# Patient Record
Sex: FEMALE | Race: WHITE | NOT HISPANIC OR LATINO | Employment: FULL TIME | URBAN - METROPOLITAN AREA
[De-identification: names, ages, dates, MRNs, and addresses within clinical notes are randomized per-mention and may not be internally consistent; named-entity substitution may affect disease eponyms.]

---

## 2017-02-15 ENCOUNTER — HOSPITAL ENCOUNTER (INPATIENT)
Facility: HOSPITAL | Age: 52
LOS: 9 days | Discharge: HOME | DRG: 234 | End: 2017-02-24
Attending: EMERGENCY MEDICINE | Admitting: INTERNAL MEDICINE
Payer: COMMERCIAL

## 2017-02-15 DIAGNOSIS — E03.4 HYPOTHYROIDISM DUE TO ACQUIRED ATROPHY OF THYROID: ICD-10-CM

## 2017-02-15 DIAGNOSIS — R73.03 PREDIABETES: ICD-10-CM

## 2017-02-15 DIAGNOSIS — Z95.1 S/P CABG X 3: Primary | ICD-10-CM

## 2017-02-15 DIAGNOSIS — I25.10 CORONARY ARTERY DISEASE INVOLVING NATIVE CORONARY ARTERY OF NATIVE HEART WITHOUT ANGINA PECTORIS: ICD-10-CM

## 2017-02-15 DIAGNOSIS — E66.9 OBESITY (BMI 30-39.9): ICD-10-CM

## 2017-02-15 DIAGNOSIS — Z01.810 PRE-OPERATIVE CARDIOVASCULAR EXAMINATION: ICD-10-CM

## 2017-02-15 DIAGNOSIS — I10 HTN (HYPERTENSION), BENIGN: ICD-10-CM

## 2017-02-15 DIAGNOSIS — R73.9 HYPERGLYCEMIA: ICD-10-CM

## 2017-02-15 DIAGNOSIS — I21.4 NSTEMI (NON-ST ELEVATED MYOCARDIAL INFARCTION): ICD-10-CM

## 2017-02-15 DIAGNOSIS — N28.9 ACUTE RENAL INSUFFICIENCY: ICD-10-CM

## 2017-02-15 DIAGNOSIS — Z72.0 TOBACCO ABUSE: ICD-10-CM

## 2017-02-15 DIAGNOSIS — I21.4 NON-ST ELEVATION (NSTEMI) MYOCARDIAL INFARCTION: ICD-10-CM

## 2017-02-15 DIAGNOSIS — F41.9 ANXIETY: ICD-10-CM

## 2017-02-15 DIAGNOSIS — R07.9 CHEST PAIN: ICD-10-CM

## 2017-02-15 PROBLEM — E03.9 HYPOTHYROID: Status: ACTIVE | Noted: 2017-02-15

## 2017-02-15 LAB
ALBUMIN SERPL BCP-MCNC: 4.1 G/DL
ALP SERPL-CCNC: 113 U/L
ALT SERPL W/O P-5'-P-CCNC: 11 U/L
ANION GAP SERPL CALC-SCNC: 10 MMOL/L
AST SERPL-CCNC: 18 U/L
BACTERIA #/AREA URNS HPF: ABNORMAL /HPF
BASOPHILS # BLD AUTO: 0.06 K/UL
BASOPHILS NFR BLD: 0.7 %
BILIRUB SERPL-MCNC: 0.3 MG/DL
BILIRUB UR QL STRIP: NEGATIVE
BUN SERPL-MCNC: 14 MG/DL
CALCIUM SERPL-MCNC: 10 MG/DL
CHLORIDE SERPL-SCNC: 108 MMOL/L
CLARITY UR: CLEAR
CO2 SERPL-SCNC: 24 MMOL/L
COLOR UR: YELLOW
CREAT SERPL-MCNC: 0.9 MG/DL
DIFFERENTIAL METHOD: ABNORMAL
EOSINOPHIL # BLD AUTO: 0.3 K/UL
EOSINOPHIL NFR BLD: 3.3 %
ERYTHROCYTE [DISTWIDTH] IN BLOOD BY AUTOMATED COUNT: 13.6 %
EST. GFR  (AFRICAN AMERICAN): >60 ML/MIN/1.73 M^2
EST. GFR  (NON AFRICAN AMERICAN): >60 ML/MIN/1.73 M^2
GLUCOSE SERPL-MCNC: 103 MG/DL
GLUCOSE UR QL STRIP: NEGATIVE
HCT VFR BLD AUTO: 50.2 %
HGB BLD-MCNC: 15.9 G/DL
HGB UR QL STRIP: NEGATIVE
HYALINE CASTS #/AREA URNS LPF: 0 /LPF
INR PPP: 0.9
KETONES UR QL STRIP: NEGATIVE
LEUKOCYTE ESTERASE UR QL STRIP: ABNORMAL
LYMPHOCYTES # BLD AUTO: 1.6 K/UL
LYMPHOCYTES NFR BLD: 17.6 %
MAGNESIUM SERPL-MCNC: 2.2 MG/DL
MAGNESIUM SERPL-MCNC: 2.5 MG/DL
MCH RBC QN AUTO: 29.2 PG
MCHC RBC AUTO-ENTMCNC: 31.7 %
MCV RBC AUTO: 92 FL
MICROSCOPIC COMMENT: ABNORMAL
MONOCYTES # BLD AUTO: 0.6 K/UL
MONOCYTES NFR BLD: 6.6 %
NEUTROPHILS # BLD AUTO: 6.6 K/UL
NEUTROPHILS NFR BLD: 71.8 %
NITRITE UR QL STRIP: NEGATIVE
PH UR STRIP: 5 [PH] (ref 5–8)
PLATELET # BLD AUTO: 247 K/UL
PMV BLD AUTO: 12.2 FL
POTASSIUM SERPL-SCNC: 4.2 MMOL/L
PROT SERPL-MCNC: 8.3 G/DL
PROT UR QL STRIP: ABNORMAL
PROTHROMBIN TIME: 10 SEC
RBC # BLD AUTO: 5.44 M/UL
RBC #/AREA URNS HPF: 0 /HPF (ref 0–4)
SODIUM SERPL-SCNC: 142 MMOL/L
SP GR UR STRIP: 1.02 (ref 1–1.03)
SQUAMOUS #/AREA URNS HPF: 5 /HPF
TROPONIN I SERPL DL<=0.01 NG/ML-MCNC: 0.04 NG/ML
TROPONIN I SERPL DL<=0.01 NG/ML-MCNC: 0.08 NG/ML
TROPONIN I SERPL DL<=0.01 NG/ML-MCNC: 0.22 NG/ML
URN SPEC COLLECT METH UR: ABNORMAL
UROBILINOGEN UR STRIP-ACNC: NEGATIVE EU/DL
WAXY CASTS #/AREA URNS LPF: 2 /LPF
WBC # BLD AUTO: 9.18 K/UL
WBC #/AREA URNS HPF: 2 /HPF (ref 0–5)

## 2017-02-15 PROCEDURE — 25000003 PHARM REV CODE 250: Performed by: EMERGENCY MEDICINE

## 2017-02-15 PROCEDURE — 80053 COMPREHEN METABOLIC PANEL: CPT

## 2017-02-15 PROCEDURE — 81000 URINALYSIS NONAUTO W/SCOPE: CPT

## 2017-02-15 PROCEDURE — 85610 PROTHROMBIN TIME: CPT

## 2017-02-15 PROCEDURE — 84484 ASSAY OF TROPONIN QUANT: CPT | Mod: 91

## 2017-02-15 PROCEDURE — 93005 ELECTROCARDIOGRAM TRACING: CPT

## 2017-02-15 PROCEDURE — 63600175 PHARM REV CODE 636 W HCPCS: Performed by: EMERGENCY MEDICINE

## 2017-02-15 PROCEDURE — 36415 COLL VENOUS BLD VENIPUNCTURE: CPT

## 2017-02-15 PROCEDURE — 25000003 PHARM REV CODE 250: Performed by: INTERNAL MEDICINE

## 2017-02-15 PROCEDURE — 85025 COMPLETE CBC W/AUTO DIFF WBC: CPT

## 2017-02-15 PROCEDURE — 83735 ASSAY OF MAGNESIUM: CPT | Mod: 91

## 2017-02-15 PROCEDURE — 99285 EMERGENCY DEPT VISIT HI MDM: CPT | Mod: 25

## 2017-02-15 PROCEDURE — 11000001 HC ACUTE MED/SURG PRIVATE ROOM

## 2017-02-15 PROCEDURE — 96372 THER/PROPH/DIAG INJ SC/IM: CPT

## 2017-02-15 RX ORDER — NITROGLYCERIN 0.4 MG/1
0.4 TABLET SUBLINGUAL EVERY 5 MIN PRN
Status: DISCONTINUED | OUTPATIENT
Start: 2017-02-15 | End: 2017-02-15

## 2017-02-15 RX ORDER — MEPERIDINE HYDROCHLORIDE 50 MG/ML
25 INJECTION INTRAMUSCULAR; INTRAVENOUS; SUBCUTANEOUS ONCE
Status: DISCONTINUED | OUTPATIENT
Start: 2017-02-16 | End: 2017-02-16

## 2017-02-15 RX ORDER — NITROGLYCERIN 0.4 MG/1
0.4 TABLET SUBLINGUAL
Status: COMPLETED | OUTPATIENT
Start: 2017-02-15 | End: 2017-02-15

## 2017-02-15 RX ORDER — ASPIRIN 325 MG
325 TABLET ORAL DAILY
Status: DISCONTINUED | OUTPATIENT
Start: 2017-02-16 | End: 2017-02-21

## 2017-02-15 RX ORDER — NEBIVOLOL 10 MG/1
10 TABLET ORAL DAILY
Status: DISCONTINUED | OUTPATIENT
Start: 2017-02-15 | End: 2017-02-21

## 2017-02-15 RX ORDER — ENOXAPARIN SODIUM 100 MG/ML
1 INJECTION SUBCUTANEOUS
Status: COMPLETED | OUTPATIENT
Start: 2017-02-15 | End: 2017-02-15

## 2017-02-15 RX ORDER — LISINOPRIL 20 MG/1
20 TABLET ORAL DAILY
Status: DISCONTINUED | OUTPATIENT
Start: 2017-02-15 | End: 2017-02-18

## 2017-02-15 RX ORDER — CLONIDINE HYDROCHLORIDE 0.1 MG/1
0.3 TABLET ORAL EVERY 6 HOURS PRN
Status: DISCONTINUED | OUTPATIENT
Start: 2017-02-15 | End: 2017-02-18

## 2017-02-15 RX ORDER — LORAZEPAM 0.5 MG/1
0.5 TABLET ORAL EVERY 8 HOURS PRN
Status: DISCONTINUED | OUTPATIENT
Start: 2017-02-15 | End: 2017-02-20

## 2017-02-15 RX ORDER — PROMETHAZINE HYDROCHLORIDE 25 MG/ML
12.5 INJECTION, SOLUTION INTRAMUSCULAR; INTRAVENOUS ONCE
Status: DISCONTINUED | OUTPATIENT
Start: 2017-02-16 | End: 2017-02-18

## 2017-02-15 RX ORDER — ASPIRIN 325 MG
325 TABLET ORAL
Status: COMPLETED | OUTPATIENT
Start: 2017-02-15 | End: 2017-02-15

## 2017-02-15 RX ORDER — MORPHINE SULFATE 10 MG/ML
2 INJECTION INTRAMUSCULAR; INTRAVENOUS; SUBCUTANEOUS EVERY 4 HOURS PRN
Status: DISCONTINUED | OUTPATIENT
Start: 2017-02-15 | End: 2017-02-21

## 2017-02-15 RX ORDER — CLOPIDOGREL 300 MG/1
300 TABLET, FILM COATED ORAL ONCE
Status: COMPLETED | OUTPATIENT
Start: 2017-02-15 | End: 2017-02-15

## 2017-02-15 RX ORDER — NITROGLYCERIN 0.4 MG/1
0.4 TABLET SUBLINGUAL EVERY 5 MIN PRN
Status: DISCONTINUED | OUTPATIENT
Start: 2017-02-15 | End: 2017-02-21

## 2017-02-15 RX ORDER — SODIUM CHLORIDE 9 MG/ML
INJECTION, SOLUTION INTRAVENOUS CONTINUOUS
Status: ACTIVE | OUTPATIENT
Start: 2017-02-15 | End: 2017-02-15

## 2017-02-15 RX ORDER — ATORVASTATIN CALCIUM 20 MG/1
80 TABLET, FILM COATED ORAL DAILY
Status: DISCONTINUED | OUTPATIENT
Start: 2017-02-15 | End: 2017-02-21

## 2017-02-15 RX ORDER — LEVOTHYROXINE SODIUM 25 UG/1
25 TABLET ORAL
Status: DISCONTINUED | OUTPATIENT
Start: 2017-02-15 | End: 2017-02-24 | Stop reason: HOSPADM

## 2017-02-15 RX ORDER — SODIUM CHLORIDE 0.9 % (FLUSH) 0.9 %
3 SYRINGE (ML) INJECTION EVERY 8 HOURS PRN
Status: DISCONTINUED | OUTPATIENT
Start: 2017-02-15 | End: 2017-02-21

## 2017-02-15 RX ORDER — ONDANSETRON 2 MG/ML
8 INJECTION INTRAMUSCULAR; INTRAVENOUS EVERY 8 HOURS PRN
Status: DISCONTINUED | OUTPATIENT
Start: 2017-02-15 | End: 2017-02-21

## 2017-02-15 RX ADMIN — LORAZEPAM 0.5 MG: 0.5 TABLET ORAL at 10:02

## 2017-02-15 RX ADMIN — ASPIRIN 325 MG ORAL TABLET 325 MG: 325 PILL ORAL at 07:02

## 2017-02-15 RX ADMIN — CLONIDINE HYDROCHLORIDE 0.3 MG: 0.1 TABLET ORAL at 03:02

## 2017-02-15 RX ADMIN — SODIUM CHLORIDE: 0.9 INJECTION, SOLUTION INTRAVENOUS at 07:02

## 2017-02-15 RX ADMIN — NEBIVOLOL HYDROCHLORIDE 10 MG: 10 TABLET ORAL at 10:02

## 2017-02-15 RX ADMIN — LISINOPRIL 20 MG: 20 TABLET ORAL at 10:02

## 2017-02-15 RX ADMIN — ENOXAPARIN SODIUM 110 MG: 100 INJECTION SUBCUTANEOUS at 07:02

## 2017-02-15 RX ADMIN — ATORVASTATIN CALCIUM 80 MG: 40 TABLET, FILM COATED ORAL at 10:02

## 2017-02-15 RX ADMIN — NITROGLYCERIN 0.4 MG: 0.4 TABLET SUBLINGUAL at 07:02

## 2017-02-15 RX ADMIN — SODIUM CHLORIDE: 0.9 INJECTION, SOLUTION INTRAVENOUS at 10:02

## 2017-02-15 RX ADMIN — LEVOTHYROXINE SODIUM 25 MCG: 25 TABLET ORAL at 10:02

## 2017-02-15 RX ADMIN — CLOPIDOGREL BISULFATE 300 MG: 300 TABLET, FILM COATED ORAL at 08:02

## 2017-02-15 NOTE — CONSULTS
Dictation #1  MRN:[id number]  CSN:[id number]    [id number] dicated    Unstable angina with + markers and inferior ST changes   + smoker   very strong FH CAD  Classic symptoms of ANABELL  Obesity  HPTN    1. Hi dose statin / check lipids  2. Cath in am (got hi dose lovenox in the ER at 8 am ---- cannot cath today because of this)  3. outpt sleep study  4. No smoking       Cath in am / possible ptca/stent or cabg ----- (see dictation)        Pt's family well known to me and Dr. Brooks who will cath tomorrow carroll navarro

## 2017-02-15 NOTE — ED PROVIDER NOTES
"Encounter Date: 2/15/2017    SCRIBE #1 NOTE: I, Juvenal Taylor, am scribing for, and in the presence of, Con Jackson MD. Other sections scribed: HPI, ROS.       History     Chief Complaint   Patient presents with    Chest Pain     "I've been having on and off chest pain for a month but it's getting worse. I also feel pain in my jaw and both of my arms" Pt says she took tylenol for pain     Review of patient's allergies indicates:   Allergen Reactions    Codeine     Prednisone      HPI Comments: CC: Chest Pain  HPI: This 52 y.o. obese female smoker with HTN, hypothyroidism, anxiety disorder and Hx of cholecystectomy presents to the ED c/o episodes of chest pain for the past 3 months. Pt explains that pain originates in her ears and then radiates through her jaw and neck into her chest. Pt states that initially this was treated with abx as a suspected ear infection, but this treatment had no effect. Pt states that these episodes of pain have started becoming more frequent and severe lately. She states that she was evaluated for this at [facility] ED a few weeks ago, but refused admit for further evaluation following a single (-) Troponin. Pt states that she has started to feel SOB with these episodes some times. Pt reports 2-3 hours of constant pain last night that eventually resolved on its own, but then woke her from her sleep this morning. Pt states her Sx are moderate at this time. She states he has been taking Advil and Tylenol for this pain with no relief. Pt denies fever, cough, N/V, abdominal pain, back pain. Sister reports that she herself had to get 4 stents placed a few months after normal stress test last year, and their father once had to get CABG x5 done after having normal stress test earlier in that particular year.      The history is provided by the patient and a relative.     Past Medical History:   Diagnosis Date    Hypertension     Hypothyroidism     Panic attack     Renal disorder     Kidney " stones     No past medical history pertinent negatives.  Past Surgical History:   Procedure Laterality Date    CHOLECYSTECTOMY      cystoscope      VAGINA SURGERY       History reviewed. No pertinent family history.  Social History   Substance Use Topics    Smoking status: Current Every Day Smoker     Packs/day: 0.50     Types: Cigarettes    Smokeless tobacco: None    Alcohol use No     Review of Systems   Constitutional: Negative for chills and fever.   HENT: Negative for congestion, ear pain and sore throat.    Eyes: Negative for pain and visual disturbance.   Respiratory: Positive for shortness of breath. Negative for cough.    Cardiovascular: Positive for chest pain.   Gastrointestinal: Negative for abdominal pain, diarrhea, nausea and vomiting.   Genitourinary: Negative for difficulty urinating and dysuria.   Musculoskeletal: Negative for arthralgias, back pain and myalgias.   Skin: Negative for wound.   Neurological: Negative for syncope and headaches.       Physical Exam   Initial Vitals   BP Pulse Resp Temp SpO2   02/15/17 0640 02/15/17 0640 02/15/17 0640 02/15/17 0640 02/15/17 0640   225/127 65 20 97.8 °F (36.6 °C) 100 %     Physical Exam  Nursing note and vitals reviewed.  Constitutional:  Nontoxic appearing middle-aged female in no obvious distress.  HENT:    Head: NC/AT    Eyes: Conjunctivae normal.   (-) scleral icterus              Mouth/Throat: MMM.     Neck: Neck supple, normal rom.   Cardiovascular: RRR  Pulmonary/Chest: CTAB  Abdominal: Soft. ND/NT.  No guarding or rebound.  (+)BS.  (-) CVA tenderness.  Musculoskeletal: FROM of all major joints. No extremity edema or tenderness.  Neurological: A&Ox4, Normal speech.  No focal motor deficits.  Normal gait  Skin: Skin is warm and dry.   Psychiatric: normal mood and affect.      ED Course   Critical Care  Date/Time: 2/20/2017 3:12 PM  Performed by: BRITTANIE NICOLE.  Authorized by: BRITTANIE NICOLE   Direct patient critical care time: 20  minutes  Additional history critical care time: 10 minutes  Ordering / reviewing critical care time: 10 minutes  Documentation critical care time: 10 minutes  Consulting other physicians critical care time: 5 minutes  Total critical care time (exclusive of procedural time) : 55 minutes  Critical care time was exclusive of separately billable procedures and treating other patients and teaching time.  Critical care was necessary to treat or prevent imminent or life-threatening deterioration of the following conditions: cardiac failure.  Critical care was time spent personally by me on the following activities: development of treatment plan with patient or surrogate, evaluation of patient's response to treatment, examination of patient, obtaining history from patient or surrogate, ordering and performing treatments and interventions, ordering and review of laboratory studies, ordering and review of radiographic studies, pulse oximetry, re-evaluation of patient's condition and review of old charts.        Labs Reviewed   CBC W/ AUTO DIFFERENTIAL - Abnormal; Notable for the following:        Result Value    RBC 5.44 (*)     Hematocrit 50.2 (*)     MCHC 31.7 (*)     Lymph% 17.6 (*)     All other components within normal limits   TROPONIN I - Abnormal; Notable for the following:     Troponin I 0.036 (*)     All other components within normal limits   URINALYSIS - Abnormal; Notable for the following:     Protein, UA 1+ (*)     Leukocytes, UA 1+ (*)     All other components within normal limits   URINALYSIS MICROSCOPIC - Abnormal; Notable for the following:     Waxy Casts, UA 2 (*)     All other components within normal limits   COMPREHENSIVE METABOLIC PANEL   PROTIME-INR   MAGNESIUM     EKG Readings: (Independently Interpreted)   Initial Reading: No STEMI.   Normal sinus rhythm, rate 61, left axis deviation, + LVH, T-wave inversion in leads 2, 3, aVF, V3 - V6.       X-Rays:   Independently Interpreted Readings:   Chest  X-Ray: Normal heart size. No focal consolidation/pneumonia, pleural effusion or pneumothorax.     Medical Decision Making:   History:   I obtained history from: someone other than patient.  Old Medical Records: I decided to obtain old medical records.  Old Records Summarized: records from clinic visits and other records.  Independently Interpreted Test(s):   I have ordered and independently interpreted X-rays - see prior notes.  I have ordered and independently interpreted EKG Reading(s) - see prior notes  Clinical Tests:   Lab Tests: Ordered and Reviewed  Radiological Study: Ordered and Reviewed  Medical Tests: Ordered and Reviewed    Differential Diagnosis:   Initial differential diagnosis includes but is not limited to...ACS/MI, aortic dissection, pericarditis, PE, pneumothorax, pneumonia, costochondritis, gastritis, GERD, pancreatitis.      Additional Medical Decision Making:   Emergent evaluation of a 52-year-old female with history of hypertension, hypothyroidism and panic attacks, presents emergency department complaining of intermittent episodes of chest pain which a progressively increased in both intensity and frequency.  VS significant for hypertension (225/127).  EKG significant for T-wave inversion in the anterior and inferolateral leads.  Basic labs within normal limits.  Troponin 0.036.  Findings at this time are concerning for an NSTEMI.  She has been given ASA, Lovenox and Plavix.   She's has been admitted to medicine for further evaluation and management including urgent Cards consultation.            Scribe Attestation:   Scribe #1: I performed the above scribed service and the documentation accurately describes the services I performed. I attest to the accuracy of the note.    Attending Attestation:           Physician Attestation for Scribe:  Physician Attestation Statement for Scribe #1: I, Con Jackson MD, reviewed documentation, as scribed by Juvenal Taylor in my presence, and it is both  accurate and complete.                 ED Course     Clinical Impression:   The primary encounter diagnosis was S/P CABG x 3. Diagnoses of Chest pain, NSTEMI (non-ST elevated myocardial infarction), Non-ST elevation (NSTEMI) myocardial infarction, Pre-operative cardiovascular examination, Hyperglycemia, Hypothyroidism due to acquired atrophy of thyroid, Obesity (BMI 30-39.9), Anxiety, HTN (hypertension), benign, Tobacco abuse, Prediabetes, Coronary artery disease involving native coronary artery of native heart without angina pectoris, and Acute renal insufficiency were also pertinent to this visit.    Disposition:   Disposition: Admitted  Condition: Ney Jackson MD  02/28/17 9296

## 2017-02-15 NOTE — PLAN OF CARE
Problem: Fall Risk (Adult)  Intervention: Monitor/Assist with Self Care    02/15/17 1003 02/15/17 1605   Daily Care Interventions   Self-Care Promotion independence encouraged --    Activity   Activity Assistance Provided --  assistance, 1 person       Intervention: Patient Rounds    02/15/17 1605   Safety Interventions   Patient Rounds bed in low position;bed wheels locked;call light in reach;clutter free environment maintained;ID band on;placement of personal items at bedside;toileting offered;visualized patient       Intervention: Safety Promotion/Fall Prevention    02/15/17 1605   Safety Interventions   Safety Promotion/Fall Prevention side rails raised x 2         Goal: Identify Related Risk Factors and Signs and Symptoms  Related risk factors and signs and symptoms are identified upon initiation of Human Response Clinical Practice Guideline (CPG)   Outcome: Ongoing (interventions implemented as appropriate)    02/15/17 1649   Fall Risk   Related Risk Factors (Fall Risk) environment unfamiliar   Signs and Symptoms (Fall Risk) presence of risk factors       Goal: Absence of Falls  Patient will demonstrate the desired outcomes by discharge/transition of care.   Outcome: Ongoing (interventions implemented as appropriate)    02/15/17 1649   Fall Risk (Adult)   Absence of Falls making progress toward outcome         Problem: Cardiac: ACS (Acute Coronary Syndrome) (Adult)  Intervention: Manage Acute Chest Pain    02/15/17 1649   Chest Pain Assessment   Chest Pain Location midsternal;jaw, left   Chest Pain Radiation shoulder       Intervention: Monitor ECG for Changes/Signs of Ischemia    02/15/17 1426   OTHER   Pulse (!) 58       Intervention: Optimize Myocardial Oxygenation/Perfusion    02/15/17 1605   Activity   Activity Type activity clustered for rest period       Intervention: Prevent/Manage Thrombi/Emboli    02/15/17 1003   OTHER   VTE Required Core Measure Pharmacological prophylaxis initiated/maintained          Goal: Signs and Symptoms of Listed Potential Problems Will be Absent, Minimized or Managed (Cardiac: ACS)  Signs and symptoms of listed potential problems will be absent, minimized or managed by discharge/transition of care (reference Cardiac: ACS (Acute Coronary Syndrome) (Adult) CPG).  Outcome: Ongoing (interventions implemented as appropriate)    02/15/17 7720   Cardiac: ACS (Acute Coronary Syndrome)   Problems Assessed (Acute Coronary Syndrome) all   Problems Present (Acute Coronary Syndrome) hemodynamic instability;ischemia leading to infarction

## 2017-02-15 NOTE — SUBJECTIVE & OBJECTIVE
Past Medical History   Diagnosis Date    Hypertension     Hypothyroidism     Panic attack     Renal disorder      Kidney stones       Past Surgical History   Procedure Laterality Date    Cystoscope      Vagina surgery      Cholecystectomy         Review of patient's allergies indicates:   Allergen Reactions    Prednisone        No current facility-administered medications on file prior to encounter.      Current Outpatient Prescriptions on File Prior to Encounter   Medication Sig    levothyroxine (SYNTHROID) 25 MCG tablet Take 25 mcg by mouth once daily.    lisinopril (PRINIVIL,ZESTRIL) 20 MG tablet Take 20 mg by mouth once daily.    nebivolol (BYSTOLIC) 5 MG Tab Take 10 mg by mouth once daily.    albuterol 90 mcg/actuation HFAA Inhale 1-2 puffs into the lungs every 6 (six) hours as needed.    ibuprofen (ADVIL,MOTRIN) 600 MG tablet Take 1 tablet (600 mg total) by mouth every 6 (six) hours as needed for Pain.     Family History     None        Social History Main Topics    Smoking status: Current Every Day Smoker     Packs/day: 0.50     Types: Cigarettes    Smokeless tobacco: Not on file    Alcohol use No    Drug use: No    Sexual activity: Not on file     Review of Systems   Constitutional: Negative for activity change, appetite change, chills, diaphoresis, fatigue and fever.   HENT: Positive for congestion.    Respiratory: Positive for chest tightness. Negative for cough, choking, shortness of breath, wheezing and stridor.    Cardiovascular: Positive for chest pain. Negative for palpitations and leg swelling.   Gastrointestinal: Negative for abdominal distention, abdominal pain, constipation, nausea and vomiting.   Endocrine: Negative for cold intolerance.   Musculoskeletal: Negative for arthralgias.   Skin: Negative for color change.   Neurological: Negative for dizziness and light-headedness.   Psychiatric/Behavioral: Negative for agitation.     Objective:     Vital Signs (Most Recent):  Temp:  97.8 °F (36.6 °C) (02/15/17 0640)  Pulse: (!) 52 (02/15/17 0852)  Resp: 20 (02/15/17 0716)  BP: (!) 164/86 (02/15/17 0851)  SpO2: 97 % (02/15/17 0852) Vital Signs (24h Range):  Temp:  [97.8 °F (36.6 °C)] 97.8 °F (36.6 °C)  Pulse:  [48-65] 52  Resp:  [20] 20  SpO2:  [95 %-100 %] 97 %  BP: (149-225)/() 164/86     Weight: 106.5 kg (234 lb 12.6 oz)  Body mass index is 40.3 kg/(m^2).    Physical Exam   Constitutional: She is oriented to person, place, and time. She appears well-developed and well-nourished.   HENT:   Head: Normocephalic and atraumatic.   Cardiovascular: Normal rate and regular rhythm.  Exam reveals no gallop and no friction rub.    Pulmonary/Chest: Effort normal and breath sounds normal. No respiratory distress. She has no wheezes. She has no rales.   Abdominal: Soft. Bowel sounds are normal. She exhibits no distension. There is no tenderness.   Neurological: She is alert and oriented to person, place, and time.   Skin: Skin is warm and dry.   Psychiatric: She has a normal mood and affect. Her behavior is normal.   Vitals reviewed.       Significant Labs:   BMP:   Recent Labs  Lab 02/15/17  0650         K 4.2      CO2 24   BUN 14   CREATININE 0.9   CALCIUM 10.0   MG 2.5     CBC:   Recent Labs  Lab 02/15/17  0650   WBC 9.18   HGB 15.9   HCT 50.2*          Significant Imaging:  CXR- negative.   EKG NSR with possible inf. Changes.   Troponin .036.     All labs reviewed and interpreted by myself.

## 2017-02-15 NOTE — NURSING
Received patient from ER to room via bed. Patient accompanied by transport and family. Transferred patient to bed. Evaluated general patient appearance and condition. Admit assessment initiated. Tele monitoring initiated. Saline lock at LAC Intact. No apparent distress noted at this time.

## 2017-02-15 NOTE — CONSULTS
"ADMITTING PHYSICIAN:  Manuel Driscoll M.D.    FAMILY PHYSICIAN:  Markie Kelly M.D.    LOCATION:  [facility], room #315B.    HISTORY OF PRESENT ILLNESS:  This is a very pleasant 52-year-old female who has   been having for several months now intermittent jaw tightness, heaviness in both   arms and chest heaviness.    She awoke with a prolonged episode of about 2 to 2-1/2 hours of pain starting   last night.    The patient has a very strong family history of heart disease.  I take care of   her sister, who has had multiple angiograms and angioplasties done.  It seems   that multiple family members developed chest pain and coronary disease and a   progressive nature at a certain point in time, usually in the mid part of their   life around 45-50 or so and continued with disease.    The patient has a history of hypothyroidism, hypertension and has been a smoker.    She started smoking at the age of 21, stopped smoking for a while, but   continues to smoke since then.  She has been counseled strongly to stop smoking.    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    She has a history of high blood pressure.    She has classic symptoms of obstructive sleep apnea.  Her  says she   snores "like for a [employer] train," some quit breathing and wake up tired in the   mornings.    She was significantly hypertensive in the ER.    Presently, the patient's EKG here on admit shows sinus bradycardia at 56 beats   per minute.  She has voltage for left ventricular hypertrophy and inferior   T-wave changes suggesting ischemia.    She has had intermittent heaviness and tightness for quite a few months now.    MEDICATIONS:  Her list of medications from home is full dose aspirin,   levothyroxine 25 mcg, lisinopril 20 and Bystolic 10 mg.    REVIEW OF SYSTEMS:  CONSTITUTIONAL:  No history of fever, chills, weight loss.  EARS AND NOSE:  No nosebleeds.  EYES:  No blurry vision or blindness.  CARDIOVASCULAR:  " History of hypertension and tobacco use, strong family history   of heart disease.  RESPIRATORY:  No wheezes or cough.  GASTROINTESTINAL:  No blood in stool or nausea/vomiting.  GENITOURINARY:  No blood in urine or increased frequency.  MUSCULOSKELETAL:  No muscle aches or cramps.  NEUROLOGICAL:  No history of syncope or paralysis.  PSYCHIATRIC:  No depression.  ENDOCRINE:  No increased thirst, polyuria or polydipsia.  HEMATOLOGICAL:  No easy bruising or bleeding.    PHYSICAL EXAMINATION:  VITAL SIGNS:  At this time reveals a blood pressure 140/70, heart rate 65 and   regular, respirations of 14.  NECK:  Supple.  2+ carotids, no bruits, JVP or thyromegaly.  CHEST:  Clear to auscultation and percussion.  CARDIAC:  Regular rate and rhythm.  ABDOMEN:  Soft with positive bowel sounds.  EXTREMITIES:  With 2+ pulses.  No peripheral edema, cyanosis or clubbing.    LABORATORY DATA:  She has had some labs done including the following; sodium   142, potassium 4.2, and creatinine 0.9.  Hemoglobin 15.9, hematocrit 50.2, and   platelet count 247,000.  Glucose 103.    A chest x-ray was done and the Radiology read it as normal.    Her troponin initially is abnormal.  It should be noted she has a troponin   abnormal at 0.036.  Urine pregnancy test is negative.  Urinalysis was   unremarkable.    IMPRESSION:  1.  Non-ST elevation myocardial infarction.  2.  Unstable angina.  3.  Abnormal EKG as described left ventricular hypertrophy and also inferior ST   and T-wave abnormalities.  4.  Very strong family history of coronary artery disease.  5.  Tobacco abuse.  6.  Increased weight.  7.  Probable significant obstructive sleep apnea.    This is a very nice lady.  I know the family well.  I take care of her sister.    She is admitted to the hospital with a unstable anginal-type syndrome, has an   abnormal EKG and abnormal markers.    RECOMMENDATION:  I am recommending angiography for her.  I will start her on a   high-dose statin right  now.  I will put her on a generic Lipitor at 80 p.o. now.    Continue the aspirin she is on and her beta-blocker and ACE inhibitor.  We   will plan for angiography.  The procedure of cardiac cath, possible angioplasty,   stent, or surgery and its risks including bleeding, natural blood vessels,   abnormal heartbeats, reaction to the x-ray dye with wheezing, rash, kidney   damage, heart attack, stroke and death were explained to the patient and her    and family in full details.  They fully understand and are asking us to   proceed.    There was a question of her wanting to transfer to [facility].  We   will go ahead and discuss this further with her.      JAVED/  dd: 02/15/2017 10:11:59 (CST)  td: 02/15/2017 10:49:50 (CST)  Doc ID   #[id number]  Job ID #[id number]    CC: LORENA Driscoll M.D.

## 2017-02-15 NOTE — ED NOTES
Pt instructed about plan of care and need for urine. Call light within reach. Will continue to monitor. Family at bedside.

## 2017-02-15 NOTE — IP AVS SNAPSHOT
[facility]  [address]  [facility] [address]  Phone: [phone]           Patient Discharge Instructions     Our goal is to set you up for success. This packet includes information on your condition, medications, and your home care. It will help you to care for yourself so you don't get sicker and need to go back to the hospital.     Please ask your nurse if you have any questions.        There are many details to remember when preparing to leave the hospital. Here is what you will need to do:    1. Take your medicine. If you are prescribed medications, review your Medication List in the following pages. You may have new medications to  at the pharmacy and others that you'll need to stop taking. Review the instructions for how and when to take your medications. Talk with your doctor or nurses if you are unsure of what to do.     2. Go to your follow-up appointments. Specific follow-up information is listed in the following pages. Your may be contacted by a transition nurse or clinical provider about future appointments. Be sure we have all of the phone numbers to reach you, if needed. Please contact your provider's office if you are unable to make an appointment.     3. Watch for warning signs. Your doctor or nurse will give you detailed warning signs to watch for and when to call for assistance. These instructions may also include educational information about your condition. If you experience any of warning signs to your health, call your doctor.               [facility]  Unless otherwise directed by your provider, please contact [facility], our nurse care line that is available for 24/7 assistance.     [phone] (toll-free)    Registered nurses in the [facility] provide clinical advisement, health education, appointment booking, and other advisory services.                    ** Verify the list of medication(s) below is accurate and up  to date. Carry this with you in case of emergency. If your medications have changed, please notify your healthcare provider.             Medication List      START taking these medications        Additional Info    Begin Date AM Noon PM Bedtime    aspirin 325 MG EC tablet   Commonly known as:  ECOTRIN   Refills:  0   Dose:  325 mg    Last time this was given:  325 mg on 2/24/2017  8:57 AM   Instructions:  Take 1 tablet (325 mg total) by mouth once daily.                               atorvastatin 40 MG tablet   Commonly known as:  LIPITOR   Quantity:  30 tablet   Refills:  11   Dose:  40 mg    Last time this was given:  40 mg on 2/24/2017  8:57 AM   Instructions:  Take 1 tablet (40 mg total) by mouth once daily.                               metoprolol tartrate 25 MG tablet   Commonly known as:  LOPRESSOR   Quantity:  60 tablet   Refills:  11   Dose:  25 mg    Last time this was given:  25 mg on 2/24/2017  8:57 AM   Instructions:  Take 1 tablet (25 mg total) by mouth 2 (two) times daily.                                  oxycodone 5 MG immediate release tablet   Commonly known as:  ROXICODONE   Quantity:  61 tablet   Refills:  0   Dose:  5 mg    Last time this was given:  10 mg on 2/24/2017 10:02 AM   Instructions:  Take 1 tablet (5 mg total) by mouth every 4 (four) hours as needed.                              CONTINUE taking these medications        Additional Info    Begin Date AM Noon PM Bedtime    albuterol 90 mcg/actuation inhaler   Quantity:  1 Inhaler   Refills:  0   Dose:  1-2 puff    Instructions:  Inhale 1-2 puffs into the lungs every 6 (six) hours as needed.                            levothyroxine 25 MCG tablet   Commonly known as:  SYNTHROID   Refills:  0   Dose:  25 mcg    Last time this was given:  25 mcg on 2/24/2017  6:14 AM   Instructions:  Take 25 mcg by mouth once daily.                               lisinopril 20 MG tablet   Commonly known as:  PRINIVIL,ZESTRIL   Refills:  0   Dose:  20 mg     Last time this was given:  20 mg on 2/21/2017  9:12 AM   Instructions:  Take 20 mg by mouth once daily.                                 STOP taking these medications     ibuprofen 600 MG tablet   Commonly known as:  ADVIL,MOTRIN       nebivolol 5 MG Tab   Commonly known as:  BYSTOLIC         ASK your doctor about these medications        Additional Info    Begin Date AM Noon PM Bedtime    furosemide 40 MG tablet   Commonly known as:  LASIX   Quantity:  90 tablet   Refills:  11   Comments:  **Patient requests 90 days supply**    Instructions:  TAKE 1 TWICE DAILY FOR 7 DAYS, 1 TABLET DAILY FOR 7 DAYS, THEN 1/2 TABLET TWICE DAILY                            potassium chloride SA 20 MEQ tablet   Commonly known as:  K-DUR,KLOR-CON   Quantity:  90 tablet   Refills:  0   Comments:  **Patient requests 90 days supply**    Instructions:  TAKE 1 TABLET(20 MEQ) BY MOUTH EVERY DAY                                 Where to Get Your Medications      These medications were sent to [id number]  [place] [facility] [address] [facility] [address]  [address] [place] [address]    Hours:  24-hours Phone:  [phone]     atorvastatin 40 MG tablet    furosemide 40 MG tablet    metoprolol tartrate 25 MG tablet    potassium chloride SA 20 MEQ tablet         You can get these medications from any pharmacy     Bring a paper prescription for each of these medications     oxycodone 5 MG immediate release tablet       You don't need a prescription for these medications     aspirin 325 MG EC tablet                  Please bring to all follow up appointments:    1. A copy of your discharge instructions.  2. All medicines you are currently taking in their original bottles.  3. Identification and insurance card.    Please arrive 15 minutes ahead of scheduled appointment time.    Please call 24 hours in advance if you must reschedule your appointment and/or time.        Follow-up Information     Follow up  "with Teresa Brooks MD.    Specialty:  Cardiology    Contact information:    [address]  [address]  [facility] [place] [address] [phone]          Follow up with Manuel Vieyra MD In 3 weeks.    Specialties:  Cardiothoracic Surgery, Transplant    Why:  For wound re-check, Follow up    Contact information:    [address]  [facility] [address]  [phone]          Discharge Instructions     Future Orders    Call MD for:  difficulty breathing or increased cough     Call MD for:  increased confusion or weakness     Call MD for:  persistent dizziness, light-headedness, or visual disturbances     Call MD for:  redness, tenderness, or signs of infection (pain, swelling, redness, odor or green/yellow discharge around incision site)     Call MD for:  severe uncontrolled pain     Call MD for:  temperature >100.4     Diet general     Questions:    Total calories:      Fat restriction, if any:      Protein restriction, if any:      Na restriction, if any:      Fluid restriction:      Additional restrictions:      No dressing needed     Other restrictions (specify):     Comments:    Sternal precautions    OXYGEN FOR HOME USE     Questions:    Liter Flow:  2    Duration:  With sleep Comment - Or when at rest    Qualifying SpO2:  spo2 82% at rest    Testing done at:  Rest    Route:  nasal cannula    Portable mode:  continuous    Device:  home concentrator with portable unit    Length of need (in months):  3 mos    Patient condition with qualifying saturation:  other chronic pulmonary condition    Height:  5' 4" (1.626 m)    Weight:  105.2 kg (232 lb)    Does patient have medical equipment at home?:  none    Alternative treatment measures have been tried or considered and deemed clinically ineffective.:  Yes    Shower on day dressing removed (No bath)       Discharge References/Attachments     CARDIAC REHABILITATION, FOLLOWING AN EXERCISE PROGRAM (ENGLISH)    REHABILITATION, CARDIAC (ENGLISH) "    AFTER BYPASS SURGERY, HEART MEDICINES (ENGLISH)    AFTER BYPASS SURGERY, WHEN TO CALL THE DOCTOR  (ENGLISH)    CARING FOR YOURSELF AFTER BYPASS SURGERY (ENGLISH)    CARING FOR YOUR INCISION AFTER BYPASS SURGERY (ENGLISH)        Primary Diagnosis     Your primary diagnosis was:  Status Post Three Vessel Coronary Artery Bypass      Admission Information     Date & Time Provider Department CSN    2/15/2017  6:42 AM Manuel Vieyra MD [facility] [id number]      Care Providers     Provider Role Specialty Primary office phone    Manuel Vieyra MD Attending Provider Cardiothoracic Surgery [phone]    Manuel Vieyra MD Consulting Physician  Cardiothoracic Surgery  [phone]    Manuel Vieyra MD Surgeon  Cardiothoracic Surgery [phone]      Your Vitals Were     BP                   97/62 (BP Location: Left arm, Patient Position: Sitting, BP Method: Automatic)           Recent Lab Values        2/22/2017                          12:30 AM           A1C 5.8           Comment for A1C at 12:30 AM on 2/22/2017:  According to ADA guidelines, hemoglobin A1C <7.0% represents  optimal control in non-pregnant diabetic patients.  Different  metrics may apply to specific populations.   Standards of Medical Care in Diabetes - 2016.  For the purpose of screening for the presence of diabetes:  <5.7%     Consistent with the absence of diabetes  5.7-6.4%  Consistent with increasing risk for diabetes   (prediabetes)  >or=6.5%  Consistent with diabetes  Currently no consensus exists for use of hemoglobin A1C  for diagnosis of diabetes for children.        Allergies as of 2/24/2017        Reactions    Prednisone       Advance Directives     An advance directive is a document which, in the event you are no longer able to make decisions for yourself, tells your healthcare team what kind of treatment you do or do not want to receive, or who you would like to make those decisions for you.  If you do  not currently have an advance directive, [facility] encourages you to create one.  For more information call:  [phone]), [phone] ([phone]),  or log on to [email or web address].        Smoking Cessation     If you would like to quit smoking:   You may be eligible for free services if you are a [place] resident and started smoking cigarettes before September 1, 1988.  Call the Smoking Cessation Trust (SCT) toll free at (476) 294-9671 or (612) 640-1511.   Call 5-962-QUIT-NOW if you do not meet the above criteria.            Language Assistance Services     ATTENTION: Language assistance services are available, free of charge. Please call [phone].      ATENCIÓN: Si habla español, tiene a jack disposición servicios gratuitos de asistencia lingüística. Llame al [phone].     CHÚ Ý: N?u b?n nói Ti?ng Vi?t, có các d?ch v? h? tr? ngôn ng? mi?n phí dành cho b?n. G?i s? [phone].         [facility] complies with applicable Federal civil rights laws and does not discriminate on the basis of race, color, national origin, age, disability, or sex.

## 2017-02-15 NOTE — ASSESSMENT & PLAN NOTE
Cards consulted.  Will have [facility] tomorrow. Continue Lovenox. Telemetry monitoring.  Hold lovenox after last dose today.

## 2017-02-15 NOTE — H&P
"[facility] Hospital Medicine  History & Physical    Patient Name: Jolynn Mancia  MRN: [id number]  Admission Date: 2/15/2017  Attending Physician: Manuel Driscoll MD   Primary Care Provider: Markie Kelly MD         Patient information was obtained from patient and ER records.     Subjective:     Principal Problem:NSTEMI (non-ST elevated myocardial infarction)    Chief Complaint:   Chief Complaint   Patient presents with    Chest Pain     "I've been having on and off chest pain for a month but it's getting worse. I also feel pain in my jaw and both of my arms" Pt says she took tylenol for pain        HPI: Mrs. Mancia is a 53 yo F with a strong family history of CAD as well as current smoker. She presents to the ED this am with a CC of chest pain. She was in her normal state of health until last night at 8pm. She started to have jaw pain that radiated down her neck into both arms with associated chest tightness.  She took Advil and relaxed and the pain subsided over the course of a few hours. This am, she had the same pain and presents to the ED for evaluation.  She is found to have a trop of .036.  Cards was consulted and the plan is to take the patient for LHC in the morning.  She is resting comfortably currently without CP.  She denies N/V or SOB.      Past Medical History   Diagnosis Date    Hypertension     Hypothyroidism     Panic attack     Renal disorder      Kidney stones       Past Surgical History   Procedure Laterality Date    Cystoscope      Vagina surgery      Cholecystectomy         Review of patient's allergies indicates:   Allergen Reactions    Prednisone        No current facility-administered medications on file prior to encounter.      Current Outpatient Prescriptions on File Prior to Encounter   Medication Sig    levothyroxine (SYNTHROID) 25 MCG tablet Take 25 mcg by mouth once daily.    lisinopril (PRINIVIL,ZESTRIL) 20 MG tablet Take 20 mg by mouth " once daily.    nebivolol (BYSTOLIC) 5 MG Tab Take 10 mg by mouth once daily.    albuterol 90 mcg/actuation HFAA Inhale 1-2 puffs into the lungs every 6 (six) hours as needed.    ibuprofen (ADVIL,MOTRIN) 600 MG tablet Take 1 tablet (600 mg total) by mouth every 6 (six) hours as needed for Pain.     Family History     None        Social History Main Topics    Smoking status: Current Every Day Smoker     Packs/day: 0.50     Types: Cigarettes    Smokeless tobacco: Not on file    Alcohol use No    Drug use: No    Sexual activity: Not on file     Review of Systems   Constitutional: Negative for activity change, appetite change, chills, diaphoresis, fatigue and fever.   HENT: Positive for congestion.    Respiratory: Positive for chest tightness. Negative for cough, choking, shortness of breath, wheezing and stridor.    Cardiovascular: Positive for chest pain. Negative for palpitations and leg swelling.   Gastrointestinal: Negative for abdominal distention, abdominal pain, constipation, nausea and vomiting.   Endocrine: Negative for cold intolerance.   Musculoskeletal: Negative for arthralgias.   Skin: Negative for color change.   Neurological: Negative for dizziness and light-headedness.   Psychiatric/Behavioral: Negative for agitation.     Objective:     Vital Signs (Most Recent):  Temp: 97.8 °F (36.6 °C) (02/15/17 0640)  Pulse: (!) 52 (02/15/17 0852)  Resp: 20 (02/15/17 0716)  BP: (!) 164/86 (02/15/17 0851)  SpO2: 97 % (02/15/17 0852) Vital Signs (24h Range):  Temp:  [97.8 °F (36.6 °C)] 97.8 °F (36.6 °C)  Pulse:  [48-65] 52  Resp:  [20] 20  SpO2:  [95 %-100 %] 97 %  BP: (149-225)/() 164/86     Weight: 106.5 kg (234 lb 12.6 oz)  Body mass index is 40.3 kg/(m^2).    Physical Exam   Constitutional: She is oriented to person, place, and time. She appears well-developed and well-nourished.   HENT:   Head: Normocephalic and atraumatic.   Cardiovascular: Normal rate and regular rhythm.  Exam reveals no gallop and  no friction rub.    Pulmonary/Chest: Effort normal and breath sounds normal. No respiratory distress. She has no wheezes. She has no rales.   Abdominal: Soft. Bowel sounds are normal. She exhibits no distension. There is no tenderness.   Neurological: She is alert and oriented to person, place, and time.   Skin: Skin is warm and dry.   Psychiatric: She has a normal mood and affect. Her behavior is normal.   Vitals reviewed.       Significant Labs:   BMP:   Recent Labs  Lab 02/15/17  0650         K 4.2      CO2 24   BUN 14   CREATININE 0.9   CALCIUM 10.0   MG 2.5     CBC:   Recent Labs  Lab 02/15/17  0650   WBC 9.18   HGB 15.9   HCT 50.2*          Significant Imaging:  CXR- negative.   EKG NSR with possible inf. Changes.   Troponin .036.     All labs reviewed and interpreted by myself.       Assessment/Plan:     * NSTEMI (non-ST elevated myocardial infarction)  Cards consulted.  Will have [facility] tomorrow. Continue Lovenox. Telemetry monitoring.  Hold lovenox after last dose today.       Anxiety  PRN Ativan.       Chest pain  Resolved.  PRN nitro.       HTN (hypertension), benign  Benign essential- continue home meds.       Obesity (BMI 30-39.9)  Body mass index is 40.3 kg/(m^2).  Morbid. Weight loss as out patient       Hypothyroid  Continue synthroid       Tobacco abuse  Smoking cessation education       VTE Risk Mitigation         Ordered     Medium Risk of VTE  Once      02/15/17 0957     Place sequential compression device  Until discontinued      02/15/17 0957     Reason for no Mechanical VTE Prophylaxis  Once      02/15/17 0957      full dose lovenox. Telemetry monitoring.  [facility] in am.      Manuel Correa MD  Department of Hospital Medicine   [facility]

## 2017-02-15 NOTE — ED TRIAGE NOTES
Pt presents to ER due to chest pain which radiates to jaw and arms bilaterally, as well as complaints of SOB.For 4-6 weeks pt has had chest pain which radiates to jaw. Pt states that se saw a doctor and was told to treat pain over the counter, but pain never went away.  Pain now radiates to arms bilaterally and she also becomes SOB intermittently.  Pt was seen at [facility] 6 weeks ago and had cardiac markers draw and all labs were normal as stated by pt. Pt took two tylenol this morning no aspirin.

## 2017-02-16 LAB
ALBUMIN SERPL BCP-MCNC: 3.4 G/DL
ALP SERPL-CCNC: 89 U/L
ALT SERPL W/O P-5'-P-CCNC: 8 U/L
ANION GAP SERPL CALC-SCNC: 6 MMOL/L
AST SERPL-CCNC: 13 U/L
BASOPHILS # BLD AUTO: 0.05 K/UL
BASOPHILS NFR BLD: 0.6 %
BILIRUB SERPL-MCNC: 0.5 MG/DL
BUN SERPL-MCNC: 12 MG/DL
CALCIUM SERPL-MCNC: 9 MG/DL
CHLORIDE SERPL-SCNC: 109 MMOL/L
CO2 SERPL-SCNC: 25 MMOL/L
CORONARY STENOSIS: ABNORMAL
CREAT SERPL-MCNC: 0.9 MG/DL
DIFFERENTIAL METHOD: NORMAL
EOSINOPHIL # BLD AUTO: 0.3 K/UL
EOSINOPHIL NFR BLD: 3.5 %
ERYTHROCYTE [DISTWIDTH] IN BLOOD BY AUTOMATED COUNT: 13.6 %
EST. GFR  (AFRICAN AMERICAN): >60 ML/MIN/1.73 M^2
EST. GFR  (NON AFRICAN AMERICAN): >60 ML/MIN/1.73 M^2
GLUCOSE SERPL-MCNC: 93 MG/DL
HCT VFR BLD AUTO: 41.2 %
HGB BLD-MCNC: 13.2 G/DL
INR PPP: 1
LYMPHOCYTES # BLD AUTO: 1.9 K/UL
LYMPHOCYTES NFR BLD: 22.1 %
MCH RBC QN AUTO: 29.8 PG
MCHC RBC AUTO-ENTMCNC: 32 %
MCV RBC AUTO: 93 FL
MONOCYTES # BLD AUTO: 0.6 K/UL
MONOCYTES NFR BLD: 6.6 %
NEUTROPHILS # BLD AUTO: 5.9 K/UL
NEUTROPHILS NFR BLD: 67.2 %
PLATELET # BLD AUTO: 210 K/UL
PMV BLD AUTO: 12.2 FL
POTASSIUM SERPL-SCNC: 4.2 MMOL/L
PROT SERPL-MCNC: 6.6 G/DL
PROTHROMBIN TIME: 10.5 SEC
RBC # BLD AUTO: 4.43 M/UL
SODIUM SERPL-SCNC: 140 MMOL/L
WBC # BLD AUTO: 8.76 K/UL

## 2017-02-16 PROCEDURE — 25000003 PHARM REV CODE 250: Performed by: EMERGENCY MEDICINE

## 2017-02-16 PROCEDURE — B2111ZZ FLUOROSCOPY OF MULTIPLE CORONARY ARTERIES USING LOW OSMOLAR CONTRAST: ICD-10-PCS | Performed by: INTERNAL MEDICINE

## 2017-02-16 PROCEDURE — 63600175 PHARM REV CODE 636 W HCPCS: Performed by: EMERGENCY MEDICINE

## 2017-02-16 PROCEDURE — 85025 COMPLETE CBC W/AUTO DIFF WBC: CPT

## 2017-02-16 PROCEDURE — 27200139 CATH LAB PROCEDURE

## 2017-02-16 PROCEDURE — 63600175 PHARM REV CODE 636 W HCPCS

## 2017-02-16 PROCEDURE — 4A023N7 MEASUREMENT OF CARDIAC SAMPLING AND PRESSURE, LEFT HEART, PERCUTANEOUS APPROACH: ICD-10-PCS | Performed by: INTERNAL MEDICINE

## 2017-02-16 PROCEDURE — 25000003 PHARM REV CODE 250: Performed by: INTERNAL MEDICINE

## 2017-02-16 PROCEDURE — 36415 COLL VENOUS BLD VENIPUNCTURE: CPT

## 2017-02-16 PROCEDURE — 11000001 HC ACUTE MED/SURG PRIVATE ROOM

## 2017-02-16 PROCEDURE — B2151ZZ FLUOROSCOPY OF LEFT HEART USING LOW OSMOLAR CONTRAST: ICD-10-PCS | Performed by: INTERNAL MEDICINE

## 2017-02-16 PROCEDURE — 80053 COMPREHEN METABOLIC PANEL: CPT

## 2017-02-16 PROCEDURE — 85610 PROTHROMBIN TIME: CPT

## 2017-02-16 PROCEDURE — 25000003 PHARM REV CODE 250

## 2017-02-16 RX ORDER — MEPERIDINE HYDROCHLORIDE 50 MG/ML
25 INJECTION INTRAMUSCULAR; INTRAVENOUS; SUBCUTANEOUS ONCE
Status: ACTIVE | OUTPATIENT
Start: 2017-02-16 | End: 2017-02-17

## 2017-02-16 RX ORDER — SODIUM CHLORIDE 9 MG/ML
INJECTION, SOLUTION INTRAVENOUS CONTINUOUS
Status: ACTIVE | OUTPATIENT
Start: 2017-02-16 | End: 2017-02-16

## 2017-02-16 RX ADMIN — LORAZEPAM 0.5 MG: 0.5 TABLET ORAL at 09:02

## 2017-02-16 RX ADMIN — NEBIVOLOL HYDROCHLORIDE 10 MG: 10 TABLET ORAL at 08:02

## 2017-02-16 RX ADMIN — ATORVASTATIN CALCIUM 80 MG: 40 TABLET, FILM COATED ORAL at 08:02

## 2017-02-16 RX ADMIN — SODIUM CHLORIDE: 0.9 INJECTION, SOLUTION INTRAVENOUS at 01:02

## 2017-02-16 RX ADMIN — LISINOPRIL 20 MG: 20 TABLET ORAL at 08:02

## 2017-02-16 RX ADMIN — ONDANSETRON 8 MG: 2 INJECTION INTRAMUSCULAR; INTRAVENOUS at 07:02

## 2017-02-16 RX ADMIN — LORAZEPAM 0.5 MG: 0.5 TABLET ORAL at 01:02

## 2017-02-16 RX ADMIN — ASPIRIN 325 MG ORAL TABLET 325 MG: 325 PILL ORAL at 08:02

## 2017-02-16 RX ADMIN — LEVOTHYROXINE SODIUM 25 MCG: 25 TABLET ORAL at 05:02

## 2017-02-16 NOTE — PLAN OF CARE
Problem: Fall Risk (Adult)  Goal: Absence of Falls  Patient will demonstrate the desired outcomes by discharge/transition of care.   Outcome: Ongoing (interventions implemented as appropriate)    02/16/17 0312   Fall Risk (Adult)   Absence of Falls making progress toward outcome   Pt did not fall or have any  Injuries during shift    Problem: Patient Care Overview  Goal: Plan of Care Review  Outcome: Ongoing (interventions implemented as appropriate)    02/16/17 0312   Coping/Psychosocial   Plan Of Care Reviewed With patient   Pt verbalized understanding plan of care. She was calm, cooperative, and accepting    Problem: Cardiac: ACS (Acute Coronary Syndrome) (Adult)  Goal: Signs and Symptoms of Listed Potential Problems Will be Absent, Minimized or Managed (Cardiac: ACS)  Signs and symptoms of listed potential problems will be absent, minimized or managed by discharge/transition of care (reference Cardiac: ACS (Acute Coronary Syndrome) (Adult) CPG).   Outcome: Ongoing (interventions implemented as appropriate)    02/16/17 0312   Cardiac: ACS (Acute Coronary Syndrome)   Problems Assessed (Acute Coronary Syndrome) all   Problems Present (Acute Coronary Syndrome) none

## 2017-02-16 NOTE — PROGRESS NOTES
[facility] Hospital Medicine  Progress Note    Patient Name: Jolynn Mancia  MRN: [id number]  Patient Class: IP- Inpatient   Admission Date: 2/15/2017  Length of Stay: 1 days  Attending Physician: Manuel Driscoll MD  Primary Care Provider: Markie Kelly MD        Subjective:     Principal Problem:NSTEMI (non-ST elevated myocardial infarction)    HPI:  Mrs. Mancia is a 53 yo F with a strong family history of CAD as well as current smoker. She presents to the ED this am with a CC of chest pain. She was in her normal state of health until last night at 8pm. She started to have jaw pain that radiated down her neck into both arms with associated chest tightness.  She took Advil and relaxed and the pain subsided over the course of a few hours. This am, she had the same pain and presents to the ED for evaluation.  She is found to have a trop of .036.  Cards was consulted and the plan is to take the patient for LHC in the morning.  She is resting comfortably currently without CP.  She denies N/V or SOB.      Hospital Course:  The patient was admitted to the hospital for evaluation of non-stemi. Patient went for LHC on 2/16.      Interval History:  No new issues. No further jaw or chest pain. No SOB. LHC shortly.    Review of Systems   Constitutional: Negative for activity change.   Respiratory: Negative for chest tightness and shortness of breath.    Cardiovascular: Negative for chest pain.   Gastrointestinal: Negative for abdominal pain.     Objective:     Vital Signs (Most Recent):  Temp: 98 °F (36.7 °C) (02/16/17 0801)  Pulse: (!) 56 (02/16/17 0801)  Resp: 19 (02/16/17 0801)  BP: (!) 148/82 (02/16/17 0801)  SpO2: (!) 92 % (02/16/17 0801) Vital Signs (24h Range):  Temp:  [97.7 °F (36.5 °C)-98.3 °F (36.8 °C)] 98 °F (36.7 °C)  Pulse:  [51-58] 56  Resp:  [18-20] 19  SpO2:  [92 %-99 %] 92 %  BP: (148-173)/(79-98) 148/82     Weight: 107.2 kg (236 lb 5.3 oz)  Body mass index is 40.57  kg/(m^2).    Intake/Output Summary (Last 24 hours) at 02/16/17 1110  Last data filed at 02/16/17 0500   Gross per 24 hour   Intake                0 ml   Output              500 ml   Net             -500 ml      Physical Exam   Constitutional: She is oriented to person, place, and time. She appears well-developed and well-nourished.   Cardiovascular: Normal rate.    Neurological: She is alert and oriented to person, place, and time.   Vitals reviewed.      Significant Labs:   BMP:   Recent Labs  Lab 02/15/17  1024 02/16/17  0411   GLU  --  93   NA  --  140   K  --  4.2   CL  --  109   CO2  --  25   BUN  --  12   CREATININE  --  0.9   CALCIUM  --  9.0   MG 2.2  --      CBC:   Recent Labs  Lab 02/15/17  0650 02/16/17  0411   WBC 9.18 8.76   HGB 15.9 13.2   HCT 50.2* 41.2    210       Significant Imaging    Assessment/Plan:      * NSTEMI (non-ST elevated myocardial infarction)  Cards consulted.  Will have [facility] shortly. Noted further increase in troponin         Anxiety  PRN Ativan.       Chest pain  Resolved.  PRN nitro.       HTN (hypertension), benign  Benign essential- continue home meds.       Obesity (BMI 30-39.9)  Body mass index is 40.57 kg/(m^2).  Morbid. Weight loss as out patient       Hypothyroid  Continue synthroid       Tobacco abuse  Smoking cessation education       VTE Risk Mitigation         Ordered     Medium Risk of VTE  Once      02/15/17 0957     Place sequential compression device  Until discontinued      02/15/17 0957     Reason for no Mechanical VTE Prophylaxis  Once      02/15/17 0957      [facility] today. Likely home by tomorrow.     Manuel Correa MD  Department of Hospital Medicine   [facility]

## 2017-02-16 NOTE — SUBJECTIVE & OBJECTIVE
Interval History:  No new issues. No further jaw or chest pain. No SOB. LHC shortly.    Review of Systems   Constitutional: Negative for activity change.   Respiratory: Negative for chest tightness and shortness of breath.    Cardiovascular: Negative for chest pain.   Gastrointestinal: Negative for abdominal pain.     Objective:     Vital Signs (Most Recent):  Temp: 98 °F (36.7 °C) (02/16/17 0801)  Pulse: (!) 56 (02/16/17 0801)  Resp: 19 (02/16/17 0801)  BP: (!) 148/82 (02/16/17 0801)  SpO2: (!) 92 % (02/16/17 0801) Vital Signs (24h Range):  Temp:  [97.7 °F (36.5 °C)-98.3 °F (36.8 °C)] 98 °F (36.7 °C)  Pulse:  [51-58] 56  Resp:  [18-20] 19  SpO2:  [92 %-99 %] 92 %  BP: (148-173)/(79-98) 148/82     Weight: 107.2 kg (236 lb 5.3 oz)  Body mass index is 40.57 kg/(m^2).    Intake/Output Summary (Last 24 hours) at 02/16/17 1110  Last data filed at 02/16/17 0500   Gross per 24 hour   Intake                0 ml   Output              500 ml   Net             -500 ml      Physical Exam   Constitutional: She is oriented to person, place, and time. She appears well-developed and well-nourished.   Cardiovascular: Normal rate.    Neurological: She is alert and oriented to person, place, and time.   Vitals reviewed.      Significant Labs:   BMP:   Recent Labs  Lab 02/15/17  1024 02/16/17  0411   GLU  --  93   NA  --  140   K  --  4.2   CL  --  109   CO2  --  25   BUN  --  12   CREATININE  --  0.9   CALCIUM  --  9.0   MG 2.2  --      CBC:   Recent Labs  Lab 02/15/17  0650 02/16/17  0411   WBC 9.18 8.76   HGB 15.9 13.2   HCT 50.2* 41.2    210       Significant Imaging

## 2017-02-16 NOTE — PLAN OF CARE
Problem: Patient Care Overview  Goal: Plan of Care Review    02/16/17 1722   Coping/Psychosocial   Plan Of Care Reviewed With patient         Problem: Cardiac: ACS (Acute Coronary Syndrome) (Adult)  Goal: Signs and Symptoms of Listed Potential Problems Will be Absent, Minimized or Managed (Cardiac: ACS)  Signs and symptoms of listed potential problems will be absent, minimized or managed by discharge/transition of care (reference Cardiac: ACS (Acute Coronary Syndrome) (Adult) CPG).     02/16/17 1722   Cardiac: ACS (Acute Coronary Syndrome)   Problems Assessed (Acute Coronary Syndrome) all   Problems Present (Acute Coronary Syndrome) situational response;dysrhythmia/arrhythmia

## 2017-02-16 NOTE — PLAN OF CARE
02/16/17 1410   Discharge Assessment   Assessment Type Discharge Planning Assessment   Confirmed/corrected address and phone number on facesheet? Yes   Assessment information obtained from? Patient   Prior to hospitilization cognitive status: Alert/Oriented   Prior to hospitalization functional status: Independent   Current cognitive status: Alert/Oriented   Current Functional Status: Independent   Arrived From home or self-care   Lives With spouse   Able to Return to Prior Arrangements yes   Is patient able to care for self after discharge? Yes   How many people do you have in your home that can help with your care after discharge? 2   Who are your caregiver(s) and their phone number(s)? yahaira- Ggcskvyq-[phone] Giovanna casanova- [phone]   Patient's perception of discharge disposition home or selfcare   Readmission Within The Last 30 Days no previous admission in last 30 days   Patient currently being followed by outpatient case management? No   Patient currently receives home health services? No   Does the patient currently use HME? No   Patient currently receives private duty nursing? N/A   Patient currently receives any other outside agency services? No   Do you have any problems affording any of your prescribed medications? No   Is the patient taking medications as prescribed? yes   Do you have any financial concerns preventing you from receiving the healthcare you need? No   Does the patient have transportation to healthcare appointments? Yes   Transportation Available car;family or friend will provide   On Dialysis? No   Does the patient receive services at the [facility]madin Clinic? No   Are there any open cases? No   Discharge Plan A Home with family

## 2017-02-16 NOTE — NURSING
Notified DOMENICO Sanchez with Dr Reddy's office that patient wants to be transferred to [facility] [place] for surgery.

## 2017-02-16 NOTE — PROGRESS NOTES
Report received from Valentine ACUÑA. Nursing rounds completed. Assessed pt's general appearance/condition.  Patient resting quietly in bed, respirations even/unlabored, no signs of distress noted, pt denies pain at this time.  Will continue to monitor.

## 2017-02-17 LAB
ALBUMIN SERPL BCP-MCNC: 3.7 G/DL
ALP SERPL-CCNC: 94 U/L
ALT SERPL W/O P-5'-P-CCNC: 10 U/L
ANION GAP SERPL CALC-SCNC: 9 MMOL/L
AST SERPL-CCNC: 18 U/L
BASOPHILS # BLD AUTO: 0.04 K/UL
BASOPHILS NFR BLD: 0.3 %
BILIRUB SERPL-MCNC: 0.6 MG/DL
BUN SERPL-MCNC: 10 MG/DL
CALCIUM SERPL-MCNC: 9.4 MG/DL
CHLORIDE SERPL-SCNC: 107 MMOL/L
CO2 SERPL-SCNC: 26 MMOL/L
CREAT SERPL-MCNC: 1 MG/DL
DIFFERENTIAL METHOD: ABNORMAL
EOSINOPHIL # BLD AUTO: 0.2 K/UL
EOSINOPHIL NFR BLD: 1.7 %
ERYTHROCYTE [DISTWIDTH] IN BLOOD BY AUTOMATED COUNT: 14.2 %
EST. GFR  (AFRICAN AMERICAN): >60 ML/MIN/1.73 M^2
EST. GFR  (NON AFRICAN AMERICAN): >60 ML/MIN/1.73 M^2
GLUCOSE SERPL-MCNC: 94 MG/DL
HCT VFR BLD AUTO: 42.6 %
HGB BLD-MCNC: 13.4 G/DL
LYMPHOCYTES # BLD AUTO: 1.5 K/UL
LYMPHOCYTES NFR BLD: 12.9 %
MCH RBC QN AUTO: 29.1 PG
MCHC RBC AUTO-ENTMCNC: 31.5 %
MCV RBC AUTO: 93 FL
MONOCYTES # BLD AUTO: 0.8 K/UL
MONOCYTES NFR BLD: 6.6 %
NEUTROPHILS # BLD AUTO: 9.2 K/UL
NEUTROPHILS NFR BLD: 78.1 %
PLATELET # BLD AUTO: 247 K/UL
PMV BLD AUTO: 12.3 FL
POTASSIUM SERPL-SCNC: 4.3 MMOL/L
PROT SERPL-MCNC: 7.2 G/DL
RBC # BLD AUTO: 4.6 M/UL
SODIUM SERPL-SCNC: 142 MMOL/L
WBC # BLD AUTO: 11.79 K/UL

## 2017-02-17 PROCEDURE — 25000003 PHARM REV CODE 250: Performed by: INTERNAL MEDICINE

## 2017-02-17 PROCEDURE — 36415 COLL VENOUS BLD VENIPUNCTURE: CPT

## 2017-02-17 PROCEDURE — 85025 COMPLETE CBC W/AUTO DIFF WBC: CPT

## 2017-02-17 PROCEDURE — 94760 N-INVAS EAR/PLS OXIMETRY 1: CPT

## 2017-02-17 PROCEDURE — 20600001 HC STEP DOWN PRIVATE ROOM

## 2017-02-17 PROCEDURE — 80053 COMPREHEN METABOLIC PANEL: CPT

## 2017-02-17 PROCEDURE — 63600175 PHARM REV CODE 636 W HCPCS: Performed by: EMERGENCY MEDICINE

## 2017-02-17 PROCEDURE — 25000003 PHARM REV CODE 250: Performed by: EMERGENCY MEDICINE

## 2017-02-17 RX ADMIN — ATORVASTATIN CALCIUM 80 MG: 40 TABLET, FILM COATED ORAL at 08:02

## 2017-02-17 RX ADMIN — LEVOTHYROXINE SODIUM 25 MCG: 25 TABLET ORAL at 05:02

## 2017-02-17 RX ADMIN — LORAZEPAM 0.5 MG: 0.5 TABLET ORAL at 08:02

## 2017-02-17 RX ADMIN — ONDANSETRON 8 MG: 2 INJECTION INTRAMUSCULAR; INTRAVENOUS at 08:02

## 2017-02-17 RX ADMIN — NEBIVOLOL HYDROCHLORIDE 10 MG: 10 TABLET ORAL at 08:02

## 2017-02-17 RX ADMIN — ASPIRIN 325 MG ORAL TABLET 325 MG: 325 PILL ORAL at 08:02

## 2017-02-17 RX ADMIN — LORAZEPAM 0.5 MG: 0.5 TABLET ORAL at 04:02

## 2017-02-17 RX ADMIN — LISINOPRIL 20 MG: 20 TABLET ORAL at 08:02

## 2017-02-17 NOTE — PLAN OF CARE
Problem: Patient Care Overview  Goal: Plan of Care Review    02/17/17 1757   Coping/Psychosocial   Plan Of Care Reviewed With patient         Problem: Cardiac: ACS (Acute Coronary Syndrome) (Adult)  Intervention: Optimize Myocardial Oxygenation/Perfusion    02/17/17 1757   Activity   Activity Type activity adjusted per tolerance         Goal: Signs and Symptoms of Listed Potential Problems Will be Absent, Minimized or Managed (Cardiac: ACS)  Signs and symptoms of listed potential problems will be absent, minimized or managed by discharge/transition of care (reference Cardiac: ACS (Acute Coronary Syndrome) (Adult) CPG).     02/17/17 1757   Cardiac: ACS (Acute Coronary Syndrome)   Problems Assessed (Acute Coronary Syndrome) all   Problems Present (Acute Coronary Syndrome) situational response

## 2017-02-17 NOTE — PLAN OF CARE
Problem: Fall Risk (Adult)  Goal: Absence of Falls  Patient will demonstrate the desired outcomes by discharge/transition of care.   Outcome: Ongoing (interventions implemented as appropriate)    02/17/17 0235   Fall Risk (Adult)   Absence of Falls making progress toward outcome         Problem: Patient Care Overview  Goal: Plan of Care Review  Outcome: Ongoing (interventions implemented as appropriate)    02/17/17 0235   Coping/Psychosocial   Plan Of Care Reviewed With patient         Problem: Cardiac: ACS (Acute Coronary Syndrome) (Adult)  Goal: Signs and Symptoms of Listed Potential Problems Will be Absent, Minimized or Managed (Cardiac: ACS)  Signs and symptoms of listed potential problems will be absent, minimized or managed by discharge/transition of care (reference Cardiac: ACS (Acute Coronary Syndrome) (Adult) CPG).   Outcome: Ongoing (interventions implemented as appropriate)    02/17/17 0235   Cardiac: ACS (Acute Coronary Syndrome)   Problems Assessed (Acute Coronary Syndrome) all   Problems Present (Acute Coronary Syndrome) none

## 2017-02-17 NOTE — PROGRESS NOTES
Patient Active Problem List   Diagnosis    NSTEMI (non-ST elevated myocardial infarction)    Anxiety    Chest pain    HTN (hypertension), benign    Obesity (BMI 30-39.9)    Hypothyroid    Tobacco abuse             MEDICATIONS  Current Facility-Administered Medications   Medication    aspirin tablet 325 mg    atorvastatin tablet 80 mg    cloNIDine tablet 0.3 mg    levothyroxine tablet 25 mcg    lisinopril tablet 20 mg    lorazepam tablet 0.5 mg    meperidine 50 mg/mL injection 25 mg    morphine injection 2 mg    nebivolol tablet 10 mg    nitroGLYCERIN SL tablet 0.4 mg    ondansetron injection 8 mg    promethazine injection 12.5 mg    sodium chloride 0.9% flush 3 mL       LABS  Lab Results   Component Value Date    WBC 11.79 02/17/2017    HGB 13.4 02/17/2017    HCT 42.6 02/17/2017    MCV 93 02/17/2017     02/17/2017     BMP    Recent Labs  Lab 02/17/17  0459   GLU 94      K 4.3      CO2 26   BUN 10   CREATININE 1.0   CALCIUM 9.4     [facility] 02/16/2017  LVEDP (Pre): 25 mmHg  Ejection Fraction: 65%  Global LV Function: normal    Diagnostic:          Patient has a right dominant coronary artery.        - Left Main Coronary Artery:             The LM is normal. There is RIDDHI 3 flow.     - Left Anterior Descending Artery:             The LAD has subtotal. There is RIDDHI 3 flow.             The proximal LAD. There is RIDDHI 3 flow. Severe bifurcation lesion with moderate size diagonal.     - D1:             The D1 has a 99% stenosis. There is RIDDHI 3 flow.     - Left Circumflex Artery:             The distal LCX has a 99% stenosis. There is RIDDHI 3 flow. First marginal 80% lesion proximally. Raine     - Right Coronary Artery:             The RCA has luminal irregularities. There is RIDDHI 3 flow.     - Common Femoral Artery:             The right CFA is normal.    AORTA    A Wire Mc J .038 was inserted into the Aorta.     LM    A Cath 6fr Jl4.0 was inserted into the ostial LM. The Wire Mc J .038  was removed. Angiography performed in multiple views in the left coronary arteries. The Cath 6fr Jl4.0 was removed.     RCA    A Cath 4f 3drc was inserted into the ostial RCA. Angiography performed in multiple views in the right coronary arteries. The Cath 4f 3drc was removed.     Left  VENTRICLE    A Cath 6f Pigtail was inserted into the left ventricle. Hemodynamics recorded in the left ventricle. Angiography performed in the left ventricle.     AORTA    The Cath 6f Pigtail was repositioned into the Aorta. Hemodynamics recorded in the Aorta.     The Cath 6f Pigtail was removed. Angiography performed to evaluate for closure device in the right femoral artery and manual pressure was applied. The Sheath 6f 11cm was removed. Total Contrast Omni 350 500ml injected was 126.0 ml. Total Contrast Omni   350 500ml used was 162.0 ml.     Recommendations     1. Routine post-cath care.  2. Follow up with Dr. Teresa Brooks.  3. Consult CV Surgery.    I certify that I was present for catheter insertion, catheter manipulation, angiography, and angiographic interpretation of this patient.     This document has been electronically    SIGNED BY: Teresa Brooks MD On: 02/16/2017 12:05    Review of Systems    Constitutional- no history of fever, chills, weight loss  Ears and Nose- No nosebleeds  Eyes- no blurry vision or blindness  Cardiovascular-see history of present illness  Respiratory-no wheezes or cough  Gastrointestinal-no blood in stool or nausea/vomiting  Genitourinary-no blood in urine or increased frequency  Musculoskeletal-no muscle aches or cramps  Neurological-no history of syncope or paralysis  Psychiatric-no depression  Endocrine-no increased thirst, polyuria or polydipsia  Hematological-no easy bruising or bleeding    Vital Sign    Temp:  [98.3 °F (36.8 °C)-98.7 °F (37.1 °C)]   Pulse:  [57-72]   Resp:  [16-20]   BP: (116-178)/(59-90)   SpO2:  [95 %-97 %]     Physical Exam    Patient is oriented to time, place and  person.  General: appearance is good; no jaundice is noted.  Eyes     : reveal no scleral icterus, arcus senilis or xanthelasma.   Skin      : is warm and dry.  Neck     : reveals no elevation of jugular venous pressure or thyromegaly.  Carotids: are 2+ without bruits.   Lungs    : are clear to auscultation and percussion.  Cardiac  : exam reveals a normal PMI location, size, and forcefulness to palpation.    Auscultation: reveals a regular rate and rhythm; no murmur, rub, gallop or click.  Abdomen:  reveals positive bowel sounds, no organomegaly, abd pulsations, or bruits       Extremities: reveal 2+pulses; no peripheral edema, no cyanosis or clubbing of the digits                                               Spoke to Dr Vieyra who has agreed to accept pt .    Patient to be transferred to [facility]        Thanks                ramon

## 2017-02-17 NOTE — PROGRESS NOTES
Call placed to [facility], spoke to Alma Delia to follow on pts transfer to [facility].  TN informed that transfer process has not been initiated. TN inquired about ADT26 that was entered on yesterday at 12:03pm by Dr Brooks.  Reshma informed TN that she does not see that in her system, but that even if seen in system that no one called to initiate the process.  TN asked if I could initiate the process and provided her with contact information to Dr Brooks office as this would be who would have to be contacted to discuss need for transfer. TN provided direct contact number to myself and number to the nurses station for transfer center to contact for additional information     1045- informed Dr Driscoll of information from transfer center and what has been done up to this point from CM standpoint.    1105- Dr Driscoll met with pt and pts family wants update on transfer to [facility] and how long it will be before transport takes place.    1109- TN contacted Laura with [facility] to notify her of the information provided to TN from transfer center.  Laura provided TN with direct contact number to Dr Richardson as Dr Brooks is off on today.    1110- call placed to transfer center at [facility] spoke to Reshma to provide her with Dr Richardson direct number to speak to Dr Vieyra if needed.  Reshma states that she has paged Dr Vieyra and is awaiting a call back.  Reshma also informed TN that she will need a new ADT order entered into system.    1400- call placed to the transfer center to follow up on pts transfer to [facility].  Spoke to Pat who informed TN that the pt has been accepted by Dr Vieyra and only thing that waiting on is for a bed to become available.  TN to continue to follow      1410- notifed pt and family of the above information and will update as new information becomes available.

## 2017-02-17 NOTE — SUBJECTIVE & OBJECTIVE
Interval History:  No new issues.     Review of Systems   Constitutional: Negative for activity change.   Respiratory: Negative for chest tightness and shortness of breath.    Cardiovascular: Negative for chest pain.   Gastrointestinal: Negative for abdominal pain.     Objective:     Vital Signs (Most Recent):  Temp: 98.4 °F (36.9 °C) (02/17/17 0829)  Pulse: 61 (02/17/17 0829)  Resp: 18 (02/17/17 0829)  BP: 121/62 (02/17/17 0829)  SpO2: 97 % (02/17/17 0846) Vital Signs (24h Range):  Temp:  [98.3 °F (36.8 °C)-98.7 °F (37.1 °C)] 98.4 °F (36.9 °C)  Pulse:  [57-72] 61  Resp:  [16-20] 18  SpO2:  [95 %-97 %] 97 %  BP: (116-178)/(59-90) 121/62     Weight: 107.2 kg (236 lb 5.3 oz)  Body mass index is 40.57 kg/(m^2).    Intake/Output Summary (Last 24 hours) at 02/17/17 1056  Last data filed at 02/16/17 2253   Gross per 24 hour   Intake               30 ml   Output                0 ml   Net               30 ml      Physical Exam   Constitutional: She is oriented to person, place, and time. She appears well-developed and well-nourished.   Cardiovascular: Normal rate.    Neurological: She is alert and oriented to person, place, and time.   Vitals reviewed.      Significant Labs:   BMP:   Recent Labs  Lab 02/17/17  0459   GLU 94      K 4.3      CO2 26   BUN 10   CREATININE 1.0   CALCIUM 9.4     CBC:   Recent Labs  Lab 02/16/17  0411 02/17/17  0459   WBC 8.76 11.79   HGB 13.2 13.4   HCT 41.2 42.6    247       Significant Imaging:

## 2017-02-17 NOTE — ASSESSMENT & PLAN NOTE
Cards consulted.  LHC evidently showed multi-vessel disease with recs to go to center for bypass.  Awaiting cards to arrange.

## 2017-02-17 NOTE — PROGRESS NOTES
Report received from Moira ACUÑA. Nursing rounds completed. Assessed pt's general appearance/condition.  Patient resting quietly in bed, respirations even/unlabored, no signs of distress noted, pt denies pain at this time.  Will continue to monitor.

## 2017-02-17 NOTE — PROGRESS NOTES
[facility] Hospital Medicine  Progress Note    Patient Name: Jolynn Mancia  MRN: [id number]  Patient Class: IP- Inpatient   Admission Date: 2/15/2017  Length of Stay: 2 days  Attending Physician: Manuel Driscoll MD  Primary Care Provider: Markie Kelly MD        Subjective:     Principal Problem:NSTEMI (non-ST elevated myocardial infarction)    HPI:  Mrs. Mancia is a 51 yo F with a strong family history of CAD as well as current smoker. She presents to the ED this am with a CC of chest pain. She was in her normal state of health until last night at 8pm. She started to have jaw pain that radiated down her neck into both arms with associated chest tightness.  She took Advil and relaxed and the pain subsided over the course of a few hours. This am, she had the same pain and presents to the ED for evaluation.  She is found to have a trop of .036.  Cards was consulted and the plan is to take the patient for LHC in the morning.  She is resting comfortably currently without CP.  She denies N/V or SOB.      Hospital Course:  The patient was admitted to the hospital for evaluation of non-stemi. Patient went for LHC on 2/16. The patient had multi-vessel disease and the recommendation was to transfer to a center for by-pass      Interval History:  No new issues.     Review of Systems   Constitutional: Negative for activity change.   Respiratory: Negative for chest tightness and shortness of breath.    Cardiovascular: Negative for chest pain.   Gastrointestinal: Negative for abdominal pain.     Objective:     Vital Signs (Most Recent):  Temp: 98.4 °F (36.9 °C) (02/17/17 0829)  Pulse: 61 (02/17/17 0829)  Resp: 18 (02/17/17 0829)  BP: 121/62 (02/17/17 0829)  SpO2: 97 % (02/17/17 0846) Vital Signs (24h Range):  Temp:  [98.3 °F (36.8 °C)-98.7 °F (37.1 °C)] 98.4 °F (36.9 °C)  Pulse:  [57-72] 61  Resp:  [16-20] 18  SpO2:  [95 %-97 %] 97 %  BP: (116-178)/(59-90) 121/62     Weight: 107.2 kg (236 lb  5.3 oz)  Body mass index is 40.57 kg/(m^2).    Intake/Output Summary (Last 24 hours) at 02/17/17 1056  Last data filed at 02/16/17 2253   Gross per 24 hour   Intake               30 ml   Output                0 ml   Net               30 ml      Physical Exam   Constitutional: She is oriented to person, place, and time. She appears well-developed and well-nourished.   Cardiovascular: Normal rate.    Neurological: She is alert and oriented to person, place, and time.   Vitals reviewed.      Significant Labs:   BMP:   Recent Labs  Lab 02/17/17  0459   GLU 94      K 4.3      CO2 26   BUN 10   CREATININE 1.0   CALCIUM 9.4     CBC:   Recent Labs  Lab 02/16/17 0411 02/17/17 0459   WBC 8.76 11.79   HGB 13.2 13.4   HCT 41.2 42.6    247       Significant Imaging:    Assessment/Plan:      * NSTEMI (non-ST elevated myocardial infarction)  Cards consulted.  [facility] evidently showed multi-vessel disease with recs to go to center for bypass.  Awaiting cards to arrange.         Anxiety  PRN Ativan.       Chest pain  Resolved.  PRN nitro.       HTN (hypertension), benign  Benign essential- continue home meds.       Obesity (BMI 30-39.9)  Body mass index is 40.57 kg/(m^2).  Morbid. Weight loss as out patient       Hypothyroid  Continue synthroid       Tobacco abuse  Smoking cessation education       VTE Risk Mitigation         Ordered     Medium Risk of VTE  Once      02/15/17 0957     Place sequential compression device  Until discontinued      02/15/17 0957     Reason for no Mechanical VTE Prophylaxis  Once      02/15/17 0957        Will transfer to [facility] [place] [place] when arranged by cards.  I don't even know officially what the angio showed.     Manuel Correa MD  Department of Hospital Medicine   [facility]

## 2017-02-18 LAB
DIASTOLIC DYSFUNCTION: NO
RETIRED EF AND QEF - SEE NOTES: 60 (ref 55–65)

## 2017-02-18 PROCEDURE — 25000003 PHARM REV CODE 250: Performed by: EMERGENCY MEDICINE

## 2017-02-18 PROCEDURE — 20600001 HC STEP DOWN PRIVATE ROOM

## 2017-02-18 PROCEDURE — 63600175 PHARM REV CODE 636 W HCPCS: Performed by: EMERGENCY MEDICINE

## 2017-02-18 PROCEDURE — 25000003 PHARM REV CODE 250: Performed by: INTERNAL MEDICINE

## 2017-02-18 PROCEDURE — 93010 ELECTROCARDIOGRAM REPORT: CPT | Mod: ,,, | Performed by: INTERNAL MEDICINE

## 2017-02-18 PROCEDURE — 93306 TTE W/DOPPLER COMPLETE: CPT | Mod: 26,,, | Performed by: INTERNAL MEDICINE

## 2017-02-18 PROCEDURE — 93306 TTE W/DOPPLER COMPLETE: CPT

## 2017-02-18 PROCEDURE — 93005 ELECTROCARDIOGRAM TRACING: CPT

## 2017-02-18 RX ORDER — CLONIDINE HYDROCHLORIDE 0.1 MG/1
0.1 TABLET ORAL EVERY 6 HOURS PRN
Status: DISCONTINUED | OUTPATIENT
Start: 2017-02-18 | End: 2017-02-18

## 2017-02-18 RX ORDER — CLONIDINE HYDROCHLORIDE 0.1 MG/1
0.2 TABLET ORAL EVERY 6 HOURS PRN
Status: DISCONTINUED | OUTPATIENT
Start: 2017-02-18 | End: 2017-02-19

## 2017-02-18 RX ADMIN — LORAZEPAM 0.5 MG: 0.5 TABLET ORAL at 12:02

## 2017-02-18 RX ADMIN — ASPIRIN 325 MG ORAL TABLET 325 MG: 325 PILL ORAL at 09:02

## 2017-02-18 RX ADMIN — ATORVASTATIN CALCIUM 80 MG: 40 TABLET, FILM COATED ORAL at 09:02

## 2017-02-18 RX ADMIN — LORAZEPAM 0.5 MG: 0.5 TABLET ORAL at 09:02

## 2017-02-18 RX ADMIN — LEVOTHYROXINE SODIUM 25 MCG: 25 TABLET ORAL at 05:02

## 2017-02-18 RX ADMIN — NITROGLYCERIN 0.4 MG: 0.4 TABLET SUBLINGUAL at 01:02

## 2017-02-18 RX ADMIN — ONDANSETRON 8 MG: 2 INJECTION INTRAMUSCULAR; INTRAVENOUS at 03:02

## 2017-02-18 RX ADMIN — LISINOPRIL 20 MG: 20 TABLET ORAL at 09:02

## 2017-02-18 RX ADMIN — NEBIVOLOL HYDROCHLORIDE 10 MG: 10 TABLET ORAL at 09:02

## 2017-02-18 RX ADMIN — NITROGLYCERIN 0.4 MG: 0.4 TABLET SUBLINGUAL at 12:02

## 2017-02-18 NOTE — PROGRESS NOTES
Progress Note  Cardiothoracic Surgery    Admit Date: 2/15/2017  Post-operative Day:    Hospital Day: 4    SUBJECTIVE: Pt was transferred in last night from [facility]. Pt has no complaints at this time of CP or SOB. Pt will be worked up for CABG and plans for possible Monday afternoon by Dr. Vieyra     Follow-up For: Procedure(s) (LRB):  HEART CATH-LEFT (Left)    Scheduled Meds:   aspirin  325 mg Oral Daily    atorvastatin  80 mg Oral Daily    levothyroxine  25 mcg Oral Before breakfast    nebivolol  10 mg Oral Daily     Infusions/Drips:   PRN Meds: cloNIDine, lorazepam, morphine, nitroGLYCERIN, ondansetron, sodium chloride 0.9%    Review of patient's allergies indicates:   Allergen Reactions    Prednisone        OBJECTIVE:     Vital Signs (Most Recent)  Temp: 98.4 °F (36.9 °C) (02/18/17 0800)  Pulse: 69 (02/18/17 1100)  Resp: 16 (02/18/17 0800)  BP: 127/69 (02/18/17 0800)  SpO2: 95 % (02/18/17 0800)    Admission Weight: 107 kg (236 lb) (02/15/17 0640)   Most Recent Weight: 107.7 kg (237 lb 6.4 oz) (02/18/17 0500)    Vital Signs Range (Last 24H):  Temp:  [98.1 °F (36.7 °C)-98.8 °F (37.1 °C)]   Pulse:  [57-72]   Resp:  [11-19]   BP: (116-146)/(62-93)   SpO2:  [95 %-100 %]     I & O (Last 24H):  Intake/Output Summary (Last 24 hours) at 02/18/17 1219  Last data filed at 02/18/17 0600   Gross per 24 hour   Intake              240 ml   Output              330 ml   Net              -90 ml     Physical Exam:  General: well developed, well nourished, no distress  Lungs:  clear to auscultation bilaterally and normal respiratory effort  Heart: regular rate and rhythm, S1, S2 normal, no murmur, rub or gallop  Abdomen: soft nontender   Pulses: Dorsalis Pedis R: 2+ (normal)/L: 2+ (normal)  Skin: Skin color, texture, turgor normal. No rashes or lesions  Neurologic: Alert and oriented. Thought content appropriate    ASSESSMENT/PLAN:     Assessment:   Active Hospital Problems    Diagnosis    *NSTEMI (non-ST elevated  myocardial infarction)    Anxiety    Chest pain    HTN (hypertension), benign    Obesity (BMI 30-39.9)    Hypothyroid    Tobacco abuse     Plan:   Work up for CABGx3: ECHO, Ct of chest, PFTs and U/S carotids  Continue medications  Holding ACE due to vasodilation with surgery  Smoking cessation   Tenative plan for Monday afternoon for CABG

## 2017-02-18 NOTE — NURSING
1st attempt to call report to nurse for transfer.  Floor nurse and charge unavailable.      Charge nurse and house supervisor notified.

## 2017-02-18 NOTE — PLAN OF CARE
Problem: Patient Care Overview  Goal: Plan of Care Review  Outcome: Ongoing (interventions implemented as appropriate)     No acute events throughout the night. Reviewed plan of care with pt and family; all questions/concerns addressed. Nitroglycerin given prn for CP. VS and assessment per flow sheet, patient progressing towards goals as tolerated. Will continue to monitor.

## 2017-02-18 NOTE — PROGRESS NOTES
Notified Erum, NP pt still has not seen an MD. Per NP team is rounding and will come by pt's rooms shortly.

## 2017-02-18 NOTE — PROGRESS NOTES
Notified Dr. Yee on call with Dr. Azalia SAN, that pt is in room 380 and has not been seen by an MD, yet. MD to come assess pt. Will continue to monitor.

## 2017-02-18 NOTE — PLAN OF CARE
Problem: Patient Care Overview  Goal: Plan of Care Review  Outcome: Ongoing (interventions implemented as appropriate)  Pt free of falls/traumas/injuries. Skin remains clean, dry, and intact. Pt had one episode of CP which was relieved before nitro could be given. Ativan for anxiety. Pt completed 2 D echo, and CT of chest; Pt still has PFTs and Card U/S bilateral to complete. Pt re-educated on importance of measuring accurate intake and out put; pt verbalized and demonstrates understanding. Reviewed plan of care with pt; and pt verbalized understanding.  Pt VSS in no distress will continue to monitor.

## 2017-02-18 NOTE — PROGRESS NOTES
02/18/17 1252   Vital Signs   /67   MAP (mmHg) 86   BP Location Left arm   BP Method Automatic   Patient Position Sitting      Pt complained of chest pain while in CT, RN was not notified by CT tech. Notified by pt's family, RN went to go retrieve pt from CT. Pt back in room vitals above, Pt's chest pain 0/10, and feels that she was more anxious than anything. Ativan given notified Dr. Schmidt ; no further orders at this time, will continue to monitor.

## 2017-02-18 NOTE — NURSING
[facility] ambulance at bedside for transport to [facility]. Patient awake, alert, oriented. No apparent distress noted.

## 2017-02-18 NOTE — NURSING
Vargas RN nurse from [facility] called for report. Report given. Awaiting arrival of ambulance to transport patient.

## 2017-02-18 NOTE — NURSING TRANSFER
Nursing Transfer Note      2/17/2017     Transfer To: 380    Transfer via stretcher    Transfer with cardiac monitoring    Transported by [employer] sent: n/a    Chart send with patient: Yes    Notified: spouse (at bedside)    Patient reassessed at: 2/17/2017, 10:15 PM    Upon arrival to floor: cardiac monitor applied, patient oriented to room, call bell in reach and bed in lowest position

## 2017-02-19 LAB
ANION GAP SERPL CALC-SCNC: 6 MMOL/L
BASOPHILS # BLD AUTO: 0.06 K/UL
BASOPHILS NFR BLD: 0.5 %
BUN SERPL-MCNC: 12 MG/DL
CALCIUM SERPL-MCNC: 9.3 MG/DL
CHLORIDE SERPL-SCNC: 106 MMOL/L
CO2 SERPL-SCNC: 26 MMOL/L
CREAT SERPL-MCNC: 1 MG/DL
DIFFERENTIAL METHOD: ABNORMAL
EOSINOPHIL # BLD AUTO: 0.5 K/UL
EOSINOPHIL NFR BLD: 4.4 %
ERYTHROCYTE [DISTWIDTH] IN BLOOD BY AUTOMATED COUNT: 13.6 %
EST. GFR  (AFRICAN AMERICAN): >60 ML/MIN/1.73 M^2
EST. GFR  (NON AFRICAN AMERICAN): >60 ML/MIN/1.73 M^2
GLUCOSE SERPL-MCNC: 94 MG/DL
HCT VFR BLD AUTO: 41.2 %
HGB BLD-MCNC: 12.9 G/DL
LYMPHOCYTES # BLD AUTO: 1.4 K/UL
LYMPHOCYTES NFR BLD: 13 %
MAGNESIUM SERPL-MCNC: 1.9 MG/DL
MCH RBC QN AUTO: 29.3 PG
MCHC RBC AUTO-ENTMCNC: 31.3 %
MCV RBC AUTO: 93 FL
MONOCYTES # BLD AUTO: 0.8 K/UL
MONOCYTES NFR BLD: 7.6 %
NEUTROPHILS # BLD AUTO: 8.1 K/UL
NEUTROPHILS NFR BLD: 74 %
PHOSPHATE SERPL-MCNC: 3.8 MG/DL
PLATELET # BLD AUTO: 213 K/UL
PMV BLD AUTO: 12.3 FL
POTASSIUM SERPL-SCNC: 4.6 MMOL/L
RBC # BLD AUTO: 4.41 M/UL
SODIUM SERPL-SCNC: 138 MMOL/L
WBC # BLD AUTO: 10.97 K/UL

## 2017-02-19 PROCEDURE — 80048 BASIC METABOLIC PNL TOTAL CA: CPT

## 2017-02-19 PROCEDURE — 20600001 HC STEP DOWN PRIVATE ROOM

## 2017-02-19 PROCEDURE — 85025 COMPLETE CBC W/AUTO DIFF WBC: CPT

## 2017-02-19 PROCEDURE — 25000003 PHARM REV CODE 250: Performed by: EMERGENCY MEDICINE

## 2017-02-19 PROCEDURE — 25000003 PHARM REV CODE 250: Performed by: INTERNAL MEDICINE

## 2017-02-19 PROCEDURE — 36415 COLL VENOUS BLD VENIPUNCTURE: CPT

## 2017-02-19 PROCEDURE — 84100 ASSAY OF PHOSPHORUS: CPT

## 2017-02-19 PROCEDURE — 93010 ELECTROCARDIOGRAM REPORT: CPT | Mod: ,,, | Performed by: INTERNAL MEDICINE

## 2017-02-19 PROCEDURE — 83735 ASSAY OF MAGNESIUM: CPT

## 2017-02-19 PROCEDURE — 25000003 PHARM REV CODE 250: Performed by: NURSE PRACTITIONER

## 2017-02-19 RX ADMIN — NITROGLYCERIN 0.4 MG: 0.4 TABLET SUBLINGUAL at 06:02

## 2017-02-19 RX ADMIN — CLONIDINE HYDROCHLORIDE 0.2 MG: 0.1 TABLET ORAL at 07:02

## 2017-02-19 RX ADMIN — LORAZEPAM 0.5 MG: 0.5 TABLET ORAL at 08:02

## 2017-02-19 RX ADMIN — NEBIVOLOL HYDROCHLORIDE 10 MG: 10 TABLET ORAL at 08:02

## 2017-02-19 RX ADMIN — ATORVASTATIN CALCIUM 80 MG: 40 TABLET, FILM COATED ORAL at 08:02

## 2017-02-19 RX ADMIN — NITROGLYCERIN 0.4 MG: 0.4 TABLET SUBLINGUAL at 07:02

## 2017-02-19 RX ADMIN — ASPIRIN 325 MG ORAL TABLET 325 MG: 325 PILL ORAL at 08:02

## 2017-02-19 RX ADMIN — LEVOTHYROXINE SODIUM 25 MCG: 25 TABLET ORAL at 07:02

## 2017-02-19 NOTE — PROGRESS NOTES
Pt seen and examined. Studies reviewed. Plan CABG 3 with LIMA to LAD, SVG to OM, and SVG to PDA.  We discussed the risks and benefits of the proposed procedure, including but not limited to death, stroke, respiratory complications, renal complications, arrythmia, and infection. Her questions have been answered, and she wishes to proceed.

## 2017-02-19 NOTE — PROGRESS NOTES
Progress Note  Cardiothoracic Surgery    Admit Date: 2/15/2017  Post-operative Day:    Hospital Day: 5    SUBJECTIVE: some CP this morning - given 1 nitro with relief. Feeling well this morning. No other complaints. VSS     Follow-up For: Procedure(s) (LRB):  HEART CATH-LEFT (Left)    Scheduled Meds:   aspirin  325 mg Oral Daily    atorvastatin  80 mg Oral Daily    levothyroxine  25 mcg Oral Before breakfast    nebivolol  10 mg Oral Daily     Infusions/Drips:   PRN Meds: lorazepam, morphine, nitroGLYCERIN, ondansetron, sodium chloride 0.9%    Review of patient's allergies indicates:   Allergen Reactions    Prednisone        OBJECTIVE:     Vital Signs (Most Recent)  Temp: 98.6 °F (37 °C) (02/19/17 0759)  Pulse: 67 (02/19/17 0759)  Resp: 18 (02/19/17 0759)  BP: 118/71 (02/19/17 0759)  SpO2: (!) 94 % (02/19/17 0759)    Admission Weight: 107 kg (236 lb) (02/15/17 0640)   Most Recent Weight: 105.4 kg (232 lb 5.8 oz) (02/19/17 0700)    Vital Signs Range (Last 24H):  Temp:  [98.2 °F (36.8 °C)-98.6 °F (37 °C)]   Pulse:  [53-69]   Resp:  [12-18]   BP: (100-126)/(64-80)   SpO2:  [92 %-99 %]     I & O (Last 24H):    Intake/Output Summary (Last 24 hours) at 02/19/17 1127  Last data filed at 02/19/17 0600   Gross per 24 hour   Intake              930 ml   Output              300 ml   Net              630 ml     Physical Exam:  General: well developed, well nourished, no distress  Lungs:  clear to auscultation bilaterally and normal respiratory effort  Heart: regular rate and rhythm, S1, S2 normal, no murmur, rub or gallop  Abdomen: soft nontender   Pulses: Dorsalis Pedis R: 2+ (normal)/L: 2+ (normal)  Skin: Skin color, texture, turgor normal. No rashes or lesions  Neurologic: Alert and oriented. Thought content appropriate    ASSESSMENT/PLAN:     Assessment:   Active Hospital Problems    Diagnosis    *NSTEMI (non-ST elevated myocardial infarction)    Anxiety    Chest pain    HTN (hypertension), benign    Obesity (BMI  30-39.9)    Hypothyroid    Tobacco abuse     Plan:   Work up for CABGx3: U/S of echo and PFTs to be completed    Continue medications  Holding ACE due to vasodilation with surgery  Smoking cessation   Nitro PRN for CP   Requested to hold off clonidine   Continue with ASA, statin  Tenative plan for Monday afternoon for CABG, NPO at midnight

## 2017-02-19 NOTE — PROGRESS NOTES
Pt complaining of CP/ burning. Nitro given x1 and AM meds given. STAT EKG ordered. VSS. CP resolved after 1 nitro. Will continue to monitor.

## 2017-02-19 NOTE — PLAN OF CARE
Problem: Patient Care Overview  Goal: Plan of Care Review  Outcome: Ongoing (interventions implemented as appropriate)  Pt resting in room throughout shift and walking in halls with family. Pt complained of CP, resolved with nitro x1. No SOB or chest pain. SR on Telemetry. VSS. Skin integrity intact and without breakdown. Hematoma to R groin present. Call light in reach. Fall precautions in place, pt did not have any falls or injuries this shift. Plan to be NPO after midnight for CABG x3 tomorrow. Plan of care discussed with patient no questions at this time. Will continue to monitor.

## 2017-02-20 ENCOUNTER — ANESTHESIA EVENT (OUTPATIENT)
Dept: SURGERY | Facility: HOSPITAL | Age: 52
DRG: 234 | End: 2017-02-20
Payer: COMMERCIAL

## 2017-02-20 ENCOUNTER — ANESTHESIA (OUTPATIENT)
Dept: SURGERY | Facility: HOSPITAL | Age: 52
DRG: 234 | End: 2017-02-20
Payer: COMMERCIAL

## 2017-02-20 LAB
ABO + RH BLD: NORMAL
ALLENS TEST: ABNORMAL
ANION GAP SERPL CALC-SCNC: 6 MMOL/L
ANION GAP SERPL CALC-SCNC: 7 MMOL/L
ANISOCYTOSIS BLD QL SMEAR: SLIGHT
APTT BLDCRRT: 22.5 SEC
BASOPHILS # BLD AUTO: 0.02 K/UL
BASOPHILS # BLD AUTO: 0.04 K/UL
BASOPHILS NFR BLD: 0.1 %
BASOPHILS NFR BLD: 0.4 %
BLD GP AB SCN CELLS X3 SERPL QL: NORMAL
BUN SERPL-MCNC: 12 MG/DL
BUN SERPL-MCNC: 15 MG/DL
CALCIUM SERPL-MCNC: 8.8 MG/DL
CALCIUM SERPL-MCNC: 9.2 MG/DL
CHLORIDE SERPL-SCNC: 108 MMOL/L
CHLORIDE SERPL-SCNC: 109 MMOL/L
CO2 SERPL-SCNC: 23 MMOL/L
CO2 SERPL-SCNC: 26 MMOL/L
CREAT SERPL-MCNC: 0.9 MG/DL
CREAT SERPL-MCNC: 0.9 MG/DL
DELSYS: ABNORMAL
DIFFERENTIAL METHOD: ABNORMAL
DIFFERENTIAL METHOD: ABNORMAL
EOSINOPHIL # BLD AUTO: 0.2 K/UL
EOSINOPHIL # BLD AUTO: 0.5 K/UL
EOSINOPHIL NFR BLD: 1.1 %
EOSINOPHIL NFR BLD: 5.2 %
ERYTHROCYTE [DISTWIDTH] IN BLOOD BY AUTOMATED COUNT: 13.6 %
ERYTHROCYTE [DISTWIDTH] IN BLOOD BY AUTOMATED COUNT: 13.7 %
ERYTHROCYTE [SEDIMENTATION RATE] IN BLOOD BY WESTERGREN METHOD: 12 MM/H
EST. GFR  (AFRICAN AMERICAN): >60 ML/MIN/1.73 M^2
EST. GFR  (AFRICAN AMERICAN): >60 ML/MIN/1.73 M^2
EST. GFR  (NON AFRICAN AMERICAN): >60 ML/MIN/1.73 M^2
EST. GFR  (NON AFRICAN AMERICAN): >60 ML/MIN/1.73 M^2
FIO2: 100
FIO2: 70
FIO2: 75
FIO2: 80
FLOW: 60
GLUCOSE SERPL-MCNC: 106 MG/DL (ref 70–110)
GLUCOSE SERPL-MCNC: 117 MG/DL (ref 70–110)
GLUCOSE SERPL-MCNC: 125 MG/DL
GLUCOSE SERPL-MCNC: 125 MG/DL (ref 70–110)
GLUCOSE SERPL-MCNC: 150 MG/DL (ref 70–110)
GLUCOSE SERPL-MCNC: 89 MG/DL (ref 70–110)
GLUCOSE SERPL-MCNC: 95 MG/DL
HCO3 UR-SCNC: 22.4 MMOL/L (ref 24–28)
HCO3 UR-SCNC: 23.7 MMOL/L (ref 24–28)
HCO3 UR-SCNC: 24.1 MMOL/L (ref 24–28)
HCO3 UR-SCNC: 24.8 MMOL/L (ref 24–28)
HCO3 UR-SCNC: 25 MMOL/L (ref 24–28)
HCO3 UR-SCNC: 26.4 MMOL/L (ref 24–28)
HCT VFR BLD AUTO: 30.8 %
HCT VFR BLD AUTO: 40.2 %
HCT VFR BLD CALC: 24 %PCV (ref 36–54)
HCT VFR BLD CALC: 26 %PCV (ref 36–54)
HCT VFR BLD CALC: 26 %PCV (ref 36–54)
HCT VFR BLD CALC: 27 %PCV (ref 36–54)
HCT VFR BLD CALC: 34 %PCV (ref 36–54)
HGB BLD-MCNC: 10.1 G/DL
HGB BLD-MCNC: 12.7 G/DL
HYPOCHROMIA BLD QL SMEAR: ABNORMAL
INR PPP: 1.1
LYMPHOCYTES # BLD AUTO: 0.9 K/UL
LYMPHOCYTES # BLD AUTO: 1.5 K/UL
LYMPHOCYTES NFR BLD: 16.5 %
LYMPHOCYTES NFR BLD: 6 %
MAGNESIUM SERPL-MCNC: 1.8 MG/DL
MAGNESIUM SERPL-MCNC: 2.5 MG/DL
MCH RBC QN AUTO: 29.5 PG
MCH RBC QN AUTO: 30.1 PG
MCHC RBC AUTO-ENTMCNC: 31.6 %
MCHC RBC AUTO-ENTMCNC: 32.8 %
MCV RBC AUTO: 92 FL
MCV RBC AUTO: 93 FL
MODE: ABNORMAL
MONOCYTES # BLD AUTO: 0.6 K/UL
MONOCYTES # BLD AUTO: 1.3 K/UL
MONOCYTES NFR BLD: 5.9 %
MONOCYTES NFR BLD: 8.9 %
NEUTROPHILS # BLD AUTO: 11.8 K/UL
NEUTROPHILS # BLD AUTO: 6.6 K/UL
NEUTROPHILS NFR BLD: 72 %
NEUTROPHILS NFR BLD: 83.5 %
OVALOCYTES BLD QL SMEAR: ABNORMAL
PCO2 BLDA: 36.6 MMHG (ref 35–45)
PCO2 BLDA: 40.2 MMHG (ref 35–45)
PCO2 BLDA: 40.2 MMHG (ref 35–45)
PCO2 BLDA: 42.3 MMHG (ref 35–45)
PCO2 BLDA: 42.4 MMHG (ref 35–45)
PCO2 BLDA: 45.8 MMHG (ref 35–45)
PEEP: 5
PH SMN: 7.34 [PH] (ref 7.35–7.45)
PH SMN: 7.36 [PH] (ref 7.35–7.45)
PH SMN: 7.38 [PH] (ref 7.35–7.45)
PH SMN: 7.39 [PH] (ref 7.35–7.45)
PH SMN: 7.4 [PH] (ref 7.35–7.45)
PH SMN: 7.4 [PH] (ref 7.35–7.45)
PHOSPHATE SERPL-MCNC: 2.9 MG/DL
PHOSPHATE SERPL-MCNC: 3.4 MG/DL
PIP: 29
PLATELET # BLD AUTO: 145 K/UL
PLATELET # BLD AUTO: 209 K/UL
PMV BLD AUTO: 11.9 FL
PMV BLD AUTO: 12.5 FL
PO2 BLDA: 178 MMHG (ref 80–100)
PO2 BLDA: 301 MMHG (ref 80–100)
PO2 BLDA: 336 MMHG (ref 80–100)
PO2 BLDA: 41 MMHG (ref 40–60)
PO2 BLDA: 431 MMHG (ref 80–100)
PO2 BLDA: 87 MMHG (ref 80–100)
POC BE: -1 MMOL/L
POC BE: -3 MMOL/L
POC BE: 0 MMOL/L
POC BE: 2 MMOL/L
POC IONIZED CALCIUM: 0.98 MMOL/L (ref 1.06–1.42)
POC IONIZED CALCIUM: 1.02 MMOL/L (ref 1.06–1.42)
POC IONIZED CALCIUM: 1.03 MMOL/L (ref 1.06–1.42)
POC IONIZED CALCIUM: 1.12 MMOL/L (ref 1.06–1.42)
POC IONIZED CALCIUM: 1.2 MMOL/L (ref 1.06–1.42)
POC SATURATED O2: 100 % (ref 95–100)
POC SATURATED O2: 75 % (ref 95–100)
POC SATURATED O2: 96 % (ref 95–100)
POC TCO2: 23 MMOL/L (ref 23–27)
POC TCO2: 25 MMOL/L (ref 23–27)
POC TCO2: 25 MMOL/L (ref 24–29)
POC TCO2: 26 MMOL/L (ref 23–27)
POC TCO2: 26 MMOL/L (ref 23–27)
POC TCO2: 28 MMOL/L (ref 23–27)
POIKILOCYTOSIS BLD QL SMEAR: SLIGHT
POLYCHROMASIA BLD QL SMEAR: ABNORMAL
POTASSIUM BLD-SCNC: 3.8 MMOL/L (ref 3.5–5.1)
POTASSIUM BLD-SCNC: 4.1 MMOL/L (ref 3.5–5.1)
POTASSIUM BLD-SCNC: 4.2 MMOL/L (ref 3.5–5.1)
POTASSIUM BLD-SCNC: 4.5 MMOL/L (ref 3.5–5.1)
POTASSIUM BLD-SCNC: 4.5 MMOL/L (ref 3.5–5.1)
POTASSIUM SERPL-SCNC: 3.9 MMOL/L
POTASSIUM SERPL-SCNC: 3.9 MMOL/L
POTASSIUM SERPL-SCNC: 4.7 MMOL/L
PROTHROMBIN TIME: 11.2 SEC
PS: 10
RBC # BLD AUTO: 3.35 M/UL
RBC # BLD AUTO: 4.31 M/UL
SAMPLE: ABNORMAL
SITE: ABNORMAL
SODIUM BLD-SCNC: 136 MMOL/L (ref 136–145)
SODIUM BLD-SCNC: 138 MMOL/L (ref 136–145)
SODIUM BLD-SCNC: 141 MMOL/L (ref 136–145)
SODIUM SERPL-SCNC: 139 MMOL/L
SODIUM SERPL-SCNC: 140 MMOL/L
SP02: 100
VT: 501
WBC # BLD AUTO: 14.18 K/UL
WBC # BLD AUTO: 9.26 K/UL

## 2017-02-20 PROCEDURE — 37799 UNLISTED PX VASCULAR SURGERY: CPT

## 2017-02-20 PROCEDURE — 27201423 OPTIME MED/SURG SUP & DEVICES STERILE SUPPLY: Performed by: THORACIC SURGERY (CARDIOTHORACIC VASCULAR SURGERY)

## 2017-02-20 PROCEDURE — 25000003 PHARM REV CODE 250: Performed by: SURGERY

## 2017-02-20 PROCEDURE — 27100025 HC TUBING, SET FLUID WARMER: Performed by: STUDENT IN AN ORGANIZED HEALTH CARE EDUCATION/TRAINING PROGRAM

## 2017-02-20 PROCEDURE — 25000003 PHARM REV CODE 250: Performed by: INTERNAL MEDICINE

## 2017-02-20 PROCEDURE — 25000003 PHARM REV CODE 250: Performed by: THORACIC SURGERY (CARDIOTHORACIC VASCULAR SURGERY)

## 2017-02-20 PROCEDURE — 27201037 HC PRESSURE MONITORING SET UP

## 2017-02-20 PROCEDURE — 84100 ASSAY OF PHOSPHORUS: CPT

## 2017-02-20 PROCEDURE — 63600175 PHARM REV CODE 636 W HCPCS: Performed by: SURGERY

## 2017-02-20 PROCEDURE — 06BQ4ZZ EXCISION OF LEFT SAPHENOUS VEIN, PERCUTANEOUS ENDOSCOPIC APPROACH: ICD-10-PCS | Performed by: THORACIC SURGERY (CARDIOTHORACIC VASCULAR SURGERY)

## 2017-02-20 PROCEDURE — 02100Z9 BYPASS CORONARY ARTERY, ONE ARTERY FROM LEFT INTERNAL MAMMARY, OPEN APPROACH: ICD-10-PCS | Performed by: THORACIC SURGERY (CARDIOTHORACIC VASCULAR SURGERY)

## 2017-02-20 PROCEDURE — 80048 BASIC METABOLIC PNL TOTAL CA: CPT | Mod: 91

## 2017-02-20 PROCEDURE — 33533 CABG ARTERIAL SINGLE: CPT | Mod: ,,, | Performed by: THORACIC SURGERY (CARDIOTHORACIC VASCULAR SURGERY)

## 2017-02-20 PROCEDURE — 36415 COLL VENOUS BLD VENIPUNCTURE: CPT

## 2017-02-20 PROCEDURE — 27000191 HC C-V MONITORING

## 2017-02-20 PROCEDURE — 27200651 HC AIRWAY, LMA: Performed by: STUDENT IN AN ORGANIZED HEALTH CARE EDUCATION/TRAINING PROGRAM

## 2017-02-20 PROCEDURE — 84100 ASSAY OF PHOSPHORUS: CPT | Mod: 91

## 2017-02-20 PROCEDURE — 33518 CABG ARTERY-VEIN TWO: CPT | Mod: ,,, | Performed by: THORACIC SURGERY (CARDIOTHORACIC VASCULAR SURGERY)

## 2017-02-20 PROCEDURE — 25000003 PHARM REV CODE 250: Performed by: STUDENT IN AN ORGANIZED HEALTH CARE EDUCATION/TRAINING PROGRAM

## 2017-02-20 PROCEDURE — 85610 PROTHROMBIN TIME: CPT

## 2017-02-20 PROCEDURE — 86901 BLOOD TYPING SEROLOGIC RH(D): CPT

## 2017-02-20 PROCEDURE — 85025 COMPLETE CBC W/AUTO DIFF WBC: CPT | Mod: 91

## 2017-02-20 PROCEDURE — 85730 THROMBOPLASTIN TIME PARTIAL: CPT

## 2017-02-20 PROCEDURE — 86900 BLOOD TYPING SEROLOGIC ABO: CPT

## 2017-02-20 PROCEDURE — 94002 VENT MGMT INPAT INIT DAY: CPT

## 2017-02-20 PROCEDURE — 20000000 HC ICU ROOM

## 2017-02-20 PROCEDURE — 33508 ENDOSCOPIC VEIN HARVEST: CPT | Mod: ,,, | Performed by: THORACIC SURGERY (CARDIOTHORACIC VASCULAR SURGERY)

## 2017-02-20 PROCEDURE — 5A1221Z PERFORMANCE OF CARDIAC OUTPUT, CONTINUOUS: ICD-10-PCS | Performed by: THORACIC SURGERY (CARDIOTHORACIC VASCULAR SURGERY)

## 2017-02-20 PROCEDURE — 36000712 HC OR TIME LEV V 1ST 15 MIN: Performed by: THORACIC SURGERY (CARDIOTHORACIC VASCULAR SURGERY)

## 2017-02-20 PROCEDURE — 82803 BLOOD GASES ANY COMBINATION: CPT

## 2017-02-20 PROCEDURE — 27100088 HC CELL SAVER

## 2017-02-20 PROCEDURE — 27200953 HC CARDIOPLEGIA SYSTEM

## 2017-02-20 PROCEDURE — C1729 CATH, DRAINAGE: HCPCS | Performed by: THORACIC SURGERY (CARDIOTHORACIC VASCULAR SURGERY)

## 2017-02-20 PROCEDURE — 021109W BYPASS CORONARY ARTERY, TWO ARTERIES FROM AORTA WITH AUTOLOGOUS VENOUS TISSUE, OPEN APPROACH: ICD-10-PCS | Performed by: THORACIC SURGERY (CARDIOTHORACIC VASCULAR SURGERY)

## 2017-02-20 PROCEDURE — 83735 ASSAY OF MAGNESIUM: CPT | Mod: 91

## 2017-02-20 PROCEDURE — 63600175 PHARM REV CODE 636 W HCPCS: Performed by: THORACIC SURGERY (CARDIOTHORACIC VASCULAR SURGERY)

## 2017-02-20 PROCEDURE — C9113 INJ PANTOPRAZOLE SODIUM, VIA: HCPCS | Performed by: SURGERY

## 2017-02-20 PROCEDURE — D9220A PRA ANESTHESIA: Mod: ,,, | Performed by: ANESTHESIOLOGY

## 2017-02-20 PROCEDURE — 85520 HEPARIN ASSAY: CPT

## 2017-02-20 PROCEDURE — 27200678 HC TRANSDUCER MONITOR KIT TRIPLE: Performed by: STUDENT IN AN ORGANIZED HEALTH CARE EDUCATION/TRAINING PROGRAM

## 2017-02-20 PROCEDURE — 93312 ECHO TRANSESOPHAGEAL: CPT | Mod: 26,59,, | Performed by: ANESTHESIOLOGY

## 2017-02-20 PROCEDURE — 27100021 HC MULTIPORT INFUSION MANIFOLD: Performed by: STUDENT IN AN ORGANIZED HEALTH CARE EDUCATION/TRAINING PROGRAM

## 2017-02-20 PROCEDURE — 25000003 PHARM REV CODE 250: Performed by: ANESTHESIOLOGY

## 2017-02-20 PROCEDURE — 27800505 HC CATH, RADIAL ARTERY KIT: Performed by: STUDENT IN AN ORGANIZED HEALTH CARE EDUCATION/TRAINING PROGRAM

## 2017-02-20 PROCEDURE — 37000009 HC ANESTHESIA EA ADD 15 MINS: Performed by: THORACIC SURGERY (CARDIOTHORACIC VASCULAR SURGERY)

## 2017-02-20 PROCEDURE — 36000713 HC OR TIME LEV V EA ADD 15 MIN: Performed by: THORACIC SURGERY (CARDIOTHORACIC VASCULAR SURGERY)

## 2017-02-20 PROCEDURE — 80048 BASIC METABOLIC PNL TOTAL CA: CPT

## 2017-02-20 PROCEDURE — 83735 ASSAY OF MAGNESIUM: CPT

## 2017-02-20 PROCEDURE — 36556 INSERT NON-TUNNEL CV CATH: CPT | Mod: 59,,, | Performed by: ANESTHESIOLOGY

## 2017-02-20 PROCEDURE — 63600175 PHARM REV CODE 636 W HCPCS: Performed by: ANESTHESIOLOGY

## 2017-02-20 PROCEDURE — 37000008 HC ANESTHESIA 1ST 15 MINUTES: Performed by: THORACIC SURGERY (CARDIOTHORACIC VASCULAR SURGERY)

## 2017-02-20 PROCEDURE — 27000175 HC ADULT BYPASS PUMP

## 2017-02-20 PROCEDURE — 36592 COLLECT BLOOD FROM PICC: CPT

## 2017-02-20 PROCEDURE — 93880 EXTRACRANIAL BILAT STUDY: CPT | Performed by: SURGERY

## 2017-02-20 PROCEDURE — 36620 INSERTION CATHETER ARTERY: CPT | Mod: 59,,, | Performed by: ANESTHESIOLOGY

## 2017-02-20 PROCEDURE — 63600175 PHARM REV CODE 636 W HCPCS: Performed by: STUDENT IN AN ORGANIZED HEALTH CARE EDUCATION/TRAINING PROGRAM

## 2017-02-20 PROCEDURE — 25000003 PHARM REV CODE 250: Performed by: EMERGENCY MEDICINE

## 2017-02-20 RX ORDER — ACETAMINOPHEN 650 MG/20.3ML
650 LIQUID ORAL EVERY 6 HOURS PRN
Status: DISCONTINUED | OUTPATIENT
Start: 2017-02-20 | End: 2017-02-21

## 2017-02-20 RX ORDER — PROPOFOL 10 MG/ML
10 INJECTION, EMULSION INTRAVENOUS CONTINUOUS
Status: DISCONTINUED | OUTPATIENT
Start: 2017-02-20 | End: 2017-02-21

## 2017-02-20 RX ORDER — POTASSIUM CHLORIDE 14.9 MG/ML
20 INJECTION INTRAVENOUS
Status: DISCONTINUED | OUTPATIENT
Start: 2017-02-20 | End: 2017-02-21

## 2017-02-20 RX ORDER — PAPAVERINE HYDROCHLORIDE 30 MG/ML
INJECTION INTRAMUSCULAR; INTRAVENOUS
Status: DISCONTINUED | OUTPATIENT
Start: 2017-02-20 | End: 2017-02-20 | Stop reason: HOSPADM

## 2017-02-20 RX ORDER — AMINOCAPROIC ACID 250 MG/ML
INJECTION, SOLUTION INTRAVENOUS
Status: DISCONTINUED | OUTPATIENT
Start: 2017-02-20 | End: 2017-02-20

## 2017-02-20 RX ORDER — PANTOPRAZOLE SODIUM 40 MG/10ML
40 INJECTION, POWDER, LYOPHILIZED, FOR SOLUTION INTRAVENOUS DAILY
Status: DISCONTINUED | OUTPATIENT
Start: 2017-02-20 | End: 2017-02-21

## 2017-02-20 RX ORDER — ETOMIDATE 2 MG/ML
INJECTION INTRAVENOUS
Status: DISCONTINUED | OUTPATIENT
Start: 2017-02-20 | End: 2017-02-20

## 2017-02-20 RX ORDER — ONDANSETRON 2 MG/ML
4 INJECTION INTRAMUSCULAR; INTRAVENOUS EVERY 12 HOURS PRN
Status: DISCONTINUED | OUTPATIENT
Start: 2017-02-20 | End: 2017-02-21

## 2017-02-20 RX ORDER — PROTAMINE SULFATE 10 MG/ML
INJECTION, SOLUTION INTRAVENOUS
Status: DISCONTINUED | OUTPATIENT
Start: 2017-02-20 | End: 2017-02-20

## 2017-02-20 RX ORDER — CEFAZOLIN SODIUM 1 G/3ML
INJECTION, POWDER, FOR SOLUTION INTRAMUSCULAR; INTRAVENOUS
Status: DISCONTINUED | OUTPATIENT
Start: 2017-02-20 | End: 2017-02-20

## 2017-02-20 RX ORDER — NOREPINEPHRINE BITARTRATE 1 MG/ML
INJECTION, SOLUTION INTRAVENOUS
Status: DISCONTINUED | OUTPATIENT
Start: 2017-02-20 | End: 2017-02-20

## 2017-02-20 RX ORDER — BACITRACIN 50000 [IU]/1
INJECTION, POWDER, FOR SOLUTION INTRAMUSCULAR
Status: DISCONTINUED | OUTPATIENT
Start: 2017-02-20 | End: 2017-02-20 | Stop reason: HOSPADM

## 2017-02-20 RX ORDER — POTASSIUM CHLORIDE 29.8 MG/ML
40 INJECTION INTRAVENOUS
Status: DISCONTINUED | OUTPATIENT
Start: 2017-02-20 | End: 2017-02-21

## 2017-02-20 RX ORDER — LIDOCAINE HCL/PF 100 MG/5ML
SYRINGE (ML) INTRAVENOUS
Status: DISCONTINUED | OUTPATIENT
Start: 2017-02-20 | End: 2017-02-20

## 2017-02-20 RX ORDER — NICARDIPINE HYDROCHLORIDE 0.2 MG/ML
INJECTION INTRAVENOUS CONTINUOUS PRN
Status: DISCONTINUED | OUTPATIENT
Start: 2017-02-20 | End: 2017-02-20

## 2017-02-20 RX ORDER — ROCURONIUM BROMIDE 10 MG/ML
INJECTION, SOLUTION INTRAVENOUS
Status: DISCONTINUED | OUTPATIENT
Start: 2017-02-20 | End: 2017-02-20

## 2017-02-20 RX ORDER — SODIUM CHLORIDE 9 MG/ML
INJECTION, SOLUTION INTRAVENOUS CONTINUOUS PRN
Status: DISCONTINUED | OUTPATIENT
Start: 2017-02-20 | End: 2017-02-20

## 2017-02-20 RX ORDER — NICARDIPINE HYDROCHLORIDE 0.2 MG/ML
1 INJECTION INTRAVENOUS CONTINUOUS
Status: DISCONTINUED | OUTPATIENT
Start: 2017-02-20 | End: 2017-02-21

## 2017-02-20 RX ORDER — PROPOFOL 10 MG/ML
VIAL (ML) INTRAVENOUS CONTINUOUS PRN
Status: DISCONTINUED | OUTPATIENT
Start: 2017-02-20 | End: 2017-02-20

## 2017-02-20 RX ORDER — POTASSIUM CHLORIDE 14.9 MG/ML
60 INJECTION INTRAVENOUS
Status: DISCONTINUED | OUTPATIENT
Start: 2017-02-20 | End: 2017-02-21

## 2017-02-20 RX ORDER — MORPHINE SULFATE 2 MG/ML
2 INJECTION, SOLUTION INTRAMUSCULAR; INTRAVENOUS
Status: DISCONTINUED | OUTPATIENT
Start: 2017-02-20 | End: 2017-02-21

## 2017-02-20 RX ORDER — FENTANYL CITRATE 50 UG/ML
INJECTION, SOLUTION INTRAMUSCULAR; INTRAVENOUS
Status: DISCONTINUED | OUTPATIENT
Start: 2017-02-20 | End: 2017-02-20

## 2017-02-20 RX ORDER — HEPARIN SODIUM 1000 [USP'U]/ML
INJECTION, SOLUTION INTRAVENOUS; SUBCUTANEOUS
Status: DISCONTINUED | OUTPATIENT
Start: 2017-02-20 | End: 2017-02-20

## 2017-02-20 RX ORDER — GLYCOPYRROLATE 0.2 MG/ML
INJECTION INTRAMUSCULAR; INTRAVENOUS
Status: DISCONTINUED | OUTPATIENT
Start: 2017-02-20 | End: 2017-02-20

## 2017-02-20 RX ORDER — ONDANSETRON 2 MG/ML
INJECTION INTRAMUSCULAR; INTRAVENOUS
Status: DISCONTINUED | OUTPATIENT
Start: 2017-02-20 | End: 2017-02-20

## 2017-02-20 RX ORDER — MIDAZOLAM HYDROCHLORIDE 1 MG/ML
INJECTION, SOLUTION INTRAMUSCULAR; INTRAVENOUS
Status: DISCONTINUED | OUTPATIENT
Start: 2017-02-20 | End: 2017-02-20

## 2017-02-20 RX ORDER — CEFAZOLIN SODIUM 2 G/50ML
2 SOLUTION INTRAVENOUS
Status: DISCONTINUED | OUTPATIENT
Start: 2017-02-20 | End: 2017-02-22

## 2017-02-20 RX ORDER — MUPIROCIN 20 MG/G
1 OINTMENT TOPICAL 2 TIMES DAILY
Status: DISCONTINUED | OUTPATIENT
Start: 2017-02-20 | End: 2017-02-21

## 2017-02-20 RX ORDER — DEXTROSE MONOHYDRATE, SODIUM CHLORIDE, AND POTASSIUM CHLORIDE 50; 1.49; 4.5 G/1000ML; G/1000ML; G/1000ML
INJECTION, SOLUTION INTRAVENOUS CONTINUOUS
Status: DISCONTINUED | OUTPATIENT
Start: 2017-02-20 | End: 2017-02-22

## 2017-02-20 RX ORDER — MAGNESIUM SULFATE HEPTAHYDRATE 40 MG/ML
2 INJECTION, SOLUTION INTRAVENOUS
Status: DISCONTINUED | OUTPATIENT
Start: 2017-02-20 | End: 2017-02-21

## 2017-02-20 RX ORDER — NITROGLYCERIN 5 MG/ML
INJECTION, SOLUTION INTRAVENOUS
Status: DISCONTINUED | OUTPATIENT
Start: 2017-02-20 | End: 2017-02-20

## 2017-02-20 RX ORDER — ACETAMINOPHEN 10 MG/ML
INJECTION, SOLUTION INTRAVENOUS
Status: DISCONTINUED | OUTPATIENT
Start: 2017-02-20 | End: 2017-02-20

## 2017-02-20 RX ORDER — DOCUSATE SODIUM 100 MG/1
100 CAPSULE, LIQUID FILLED ORAL 2 TIMES DAILY
Status: DISCONTINUED | OUTPATIENT
Start: 2017-02-20 | End: 2017-02-24 | Stop reason: HOSPADM

## 2017-02-20 RX ADMIN — LIDOCAINE HYDROCHLORIDE 100 MG: 20 INJECTION, SOLUTION INTRAVENOUS at 01:02

## 2017-02-20 RX ADMIN — MUPIROCIN 1 G: 20 OINTMENT TOPICAL at 10:02

## 2017-02-20 RX ADMIN — GLYCOPYRROLATE 0.2 MG: 0.2 INJECTION INTRAMUSCULAR; INTRAVENOUS at 03:02

## 2017-02-20 RX ADMIN — MIDAZOLAM HYDROCHLORIDE 3 MG: 1 INJECTION, SOLUTION INTRAMUSCULAR; INTRAVENOUS at 04:02

## 2017-02-20 RX ADMIN — HEPARIN SODIUM 5000 UNITS: 1000 INJECTION, SOLUTION INTRAVENOUS; SUBCUTANEOUS at 04:02

## 2017-02-20 RX ADMIN — ETOMIDATE 14 MG: 2 INJECTION, SOLUTION INTRAVENOUS at 01:02

## 2017-02-20 RX ADMIN — FENTANYL CITRATE 100 MCG: 50 INJECTION, SOLUTION INTRAMUSCULAR; INTRAVENOUS at 03:02

## 2017-02-20 RX ADMIN — ROCURONIUM BROMIDE 100 MG: 10 INJECTION, SOLUTION INTRAVENOUS at 01:02

## 2017-02-20 RX ADMIN — CEFAZOLIN SODIUM 2 G: 2 SOLUTION INTRAVENOUS at 10:02

## 2017-02-20 RX ADMIN — NITROGLYCERIN 0.4 MG: 0.4 TABLET SUBLINGUAL at 07:02

## 2017-02-20 RX ADMIN — MIDAZOLAM HYDROCHLORIDE 3 MG: 1 INJECTION, SOLUTION INTRAMUSCULAR; INTRAVENOUS at 01:02

## 2017-02-20 RX ADMIN — AMINOCAPROIC ACID 10 G: 250 INJECTION, SOLUTION INTRAVENOUS at 02:02

## 2017-02-20 RX ADMIN — ATORVASTATIN CALCIUM 80 MG: 40 TABLET, FILM COATED ORAL at 08:02

## 2017-02-20 RX ADMIN — FENTANYL CITRATE 150 MCG: 50 INJECTION, SOLUTION INTRAMUSCULAR; INTRAVENOUS at 02:02

## 2017-02-20 RX ADMIN — PROPOFOL 10 MCG/KG/MIN: 10 INJECTION, EMULSION INTRAVENOUS at 09:02

## 2017-02-20 RX ADMIN — CALCIUM CHLORIDE 500 MG: 100 INJECTION, SOLUTION INTRAVENOUS at 07:02

## 2017-02-20 RX ADMIN — SODIUM CHLORIDE, SODIUM GLUCONATE, SODIUM ACETATE, POTASSIUM CHLORIDE, MAGNESIUM CHLORIDE, SODIUM PHOSPHATE, DIBASIC, AND POTASSIUM PHOSPHATE: .53; .5; .37; .037; .03; .012; .00082 INJECTION, SOLUTION INTRAVENOUS at 02:02

## 2017-02-20 RX ADMIN — PROTAMINE SULFATE 177 MG: 10 INJECTION, SOLUTION INTRAVENOUS at 06:02

## 2017-02-20 RX ADMIN — LORAZEPAM 0.5 MG: 0.5 TABLET ORAL at 08:02

## 2017-02-20 RX ADMIN — LEVOTHYROXINE SODIUM 25 MCG: 25 TABLET ORAL at 08:02

## 2017-02-20 RX ADMIN — ROCURONIUM BROMIDE 30 MG: 10 INJECTION, SOLUTION INTRAVENOUS at 06:02

## 2017-02-20 RX ADMIN — DEXTROSE MONOHYDRATE, SODIUM CHLORIDE, AND POTASSIUM CHLORIDE: 50; 4.5; 1.49 INJECTION, SOLUTION INTRAVENOUS at 09:02

## 2017-02-20 RX ADMIN — NITROGLYCERIN 50 MCG: 5 INJECTION, SOLUTION INTRAVENOUS at 06:02

## 2017-02-20 RX ADMIN — LIDOCAINE HYDROCHLORIDE 100 MG: 20 INJECTION, SOLUTION INTRAVENOUS at 06:02

## 2017-02-20 RX ADMIN — MIDAZOLAM HYDROCHLORIDE 4 MG: 1 INJECTION, SOLUTION INTRAMUSCULAR; INTRAVENOUS at 06:02

## 2017-02-20 RX ADMIN — AMINOCAPROIC ACID 1 G/HR: 250 INJECTION, SOLUTION INTRAVENOUS at 02:02

## 2017-02-20 RX ADMIN — SODIUM CHLORIDE 1 UNITS/HR: 9 INJECTION, SOLUTION INTRAVENOUS at 10:02

## 2017-02-20 RX ADMIN — NOREPINEPHRINE BITARTRATE 0.03 MG: 1 INJECTION, SOLUTION, CONCENTRATE INTRAVENOUS at 03:02

## 2017-02-20 RX ADMIN — NITROGLYCERIN 0.4 MG: 0.4 TABLET SUBLINGUAL at 06:02

## 2017-02-20 RX ADMIN — NICARDIPINE HYDROCHLORIDE 5 MG/HR: 0.2 INJECTION, SOLUTION INTRAVENOUS at 09:02

## 2017-02-20 RX ADMIN — ACETAMINOPHEN 1000 MG: 10 INJECTION, SOLUTION INTRAVENOUS at 06:02

## 2017-02-20 RX ADMIN — ASPIRIN 325 MG ORAL TABLET 325 MG: 325 PILL ORAL at 08:02

## 2017-02-20 RX ADMIN — NEBIVOLOL HYDROCHLORIDE 10 MG: 10 TABLET ORAL at 08:02

## 2017-02-20 RX ADMIN — CEFAZOLIN 3 G: 1 INJECTION, POWDER, FOR SOLUTION INTRAVENOUS at 06:02

## 2017-02-20 RX ADMIN — SODIUM CHLORIDE: 0.9 INJECTION, SOLUTION INTRAVENOUS at 01:02

## 2017-02-20 RX ADMIN — CALCIUM CHLORIDE 500 MG: 100 INJECTION, SOLUTION INTRAVENOUS at 06:02

## 2017-02-20 RX ADMIN — PROPOFOL 25 MCG/KG/MIN: 10 INJECTION, EMULSION INTRAVENOUS at 07:02

## 2017-02-20 RX ADMIN — PANTOPRAZOLE SODIUM 40 MG: 40 INJECTION, POWDER, FOR SOLUTION INTRAVENOUS at 09:02

## 2017-02-20 RX ADMIN — FENTANYL CITRATE 250 MCG: 50 INJECTION, SOLUTION INTRAMUSCULAR; INTRAVENOUS at 03:02

## 2017-02-20 RX ADMIN — ROCURONIUM BROMIDE 50 MG: 10 INJECTION, SOLUTION INTRAVENOUS at 03:02

## 2017-02-20 RX ADMIN — NICARDIPINE HYDROCHLORIDE 15 MG/HR: 0.2 INJECTION, SOLUTION INTRAVENOUS at 06:02

## 2017-02-20 RX ADMIN — NITROGLYCERIN 100 MCG: 5 INJECTION, SOLUTION INTRAVENOUS at 06:02

## 2017-02-20 RX ADMIN — CEFAZOLIN 3 G: 1 INJECTION, POWDER, FOR SOLUTION INTRAVENOUS at 02:02

## 2017-02-20 RX ADMIN — FENTANYL CITRATE 250 MCG: 50 INJECTION, SOLUTION INTRAMUSCULAR; INTRAVENOUS at 06:02

## 2017-02-20 RX ADMIN — FENTANYL CITRATE 500 MCG: 50 INJECTION, SOLUTION INTRAMUSCULAR; INTRAVENOUS at 02:02

## 2017-02-20 RX ADMIN — HEPARIN SODIUM 19000 UNITS: 1000 INJECTION, SOLUTION INTRAVENOUS; SUBCUTANEOUS at 04:02

## 2017-02-20 RX ADMIN — FENTANYL CITRATE 500 MCG: 50 INJECTION, SOLUTION INTRAMUSCULAR; INTRAVENOUS at 01:02

## 2017-02-20 NOTE — ANESTHESIA PROCEDURE NOTES
Central Line    Diagnosis: CAD  Patient location during procedure: done in OR  Procedure start time: 2/20/2017 2:10 PM  Timeout: 2/20/2017 2:10 PM  Procedure end time: 2/20/2017 2:20 PM  Staffing  Anesthesiologist: KEISHA NGUYEN  Resident/CRNA: IRA KOWALSKI  Performed by: resident/CRNA   Anesthesiologist was present at the time of the procedure.  Preanesthetic Checklist  Completed: patient identified, site marked, surgical consent, pre-op evaluation, timeout performed, IV checked, risks and benefits discussed, monitors and equipment checked and anesthesia consent given  Indication  Indication: hemodynamic monitoring, vascular access, med administration     Anesthesia   general anesthesia    Central Line  Skin Prep: skin prepped with ChloraPrep, skin prep agent completely dried prior to procedure  maximum sterile barriers used during central venous catheter insertion  hand hygiene performed prior to central venous catheter insertion  Location: right internal jugular,   Catheter Type: Quad Lumen.  Catheter Size: 8.5 Fr  Ultrasound: none   Manometry: Venous cannualation confirmed by visual estimation of blood vessel pressure using manometry.  Insertion Attempts: 1   Securement:line sutured, chlorhexidine patch, sterile dressing applied and blood return through all ports     Post-Procedure  Adverse Events:none

## 2017-02-20 NOTE — ANESTHESIA PREPROCEDURE EVALUATION
Pre-operative evaluation for AORTOCORONARY BYPASS-CABG (N/A), HARVEST-VEIN-ENDOVASCULAR (N/A)    ID:   Jolynn Mancia is a 52 y.o. female with CAD, Active Smoker (1/2 ppd x 20 years), HTN, Obesity, Hypothyroidism, Anxiety.        Case Discussion: Presented with NSTEMI 2/15. C 2/16 with 3 Vessel Dz (D1 99% Stenosis, Distal LCX 99% stenosis, M1 80% Stenosis) and echo without chamber or valvular abnormality. METS <4. No issues with previous anesthesia. NPO since 1700. On  qDay with single plavix load 2/15.     States she snores with apneic episode that wake her but no formal diagnosis of ANABELL. Is prescribed an inhaler but never uses it. Denies COPD/Asthma.      LDA:  - 18g PIV L FA      Previous Airway:  - None on file    Drips:   - N/A    Past Surgical History   Procedure Laterality Date    Cystoscope      Vagina surgery      Cholecystectomy         Vital Signs Range (Last 24H):  Temp:  [36.9 °C (98.4 °F)-37.1 °C (98.8 °F)]   Pulse:  [47-65]   Resp:  [14-22]   BP: (101-117)/(64-79)   SpO2:  [87 %-95 %]       CBC:     Recent Labs  Lab 02/15/17  0650 02/16/17  0411 02/17/17  0459 02/19/17  0642 02/20/17  0647   WBC 9.18 8.76 11.79 10.97 9.26   RBC 5.44* 4.43 4.60 4.41 4.31   HGB 15.9 13.2 13.4 12.9 12.7   HCT 50.2* 41.2 42.6 41.2 40.2    210 247 213 209   MCV 92 93 93 93 93   MCH 29.2 29.8 29.1 29.3 29.5   MCHC 31.7* 32.0 31.5* 31.3* 31.6*       CMP:   Recent Labs  Lab 02/15/17  0650 02/15/17  1024 02/16/17  0411 02/17/17  0459 02/19/17  0642 02/20/17  0646     --  140 142 138 140   K 4.2  --  4.2 4.3 4.6 4.7     --  109 107 106 108   CO2 24  --  25 26 26 26   BUN 14  --  12 10 12 15   CREATININE 0.9  --  0.9 1.0 1.0 0.9     --  93 94 94 95   MG 2.5 2.2  --   --  1.9 1.8   PHOS  --   --   --   --  3.8 3.4   CALCIUM 10.0  --  9.0 9.4 9.3 9.2   ALBUMIN 4.1  --  3.4* 3.7  --   --    PROT 8.3  --  6.6 7.2  --   --    ALKPHOS 113  --  89 94  --   --    ALT 11  --  8* 10  --   --    AST  18  --  13 18  --   --    BILITOT 0.3  --  0.5 0.6  --   --        INR:    Recent Labs  Lab 02/15/17  0650 02/16/17  0411   INR 0.9 1.0         Diagnostic Studies:      EKG 2/19/17:  - Sinus Bradycardia    2D Echo:  - EF 60%, No chamber or valvular abnormality.     [facility] Anesthesia Evaluation    I have reviewed the Patient Summary Reports.     I have reviewed the Medications.     Review of Systems  Anesthesia Hx:  No problems with previous Anesthesia Denies Hx of Anesthetic complications  History of prior surgery of interest to airway management or planning:  Denies Personal Hx of Anesthesia complications.   Social:  Smoker    Cardiovascular:   Hypertension Denies Valvular problems/Murmurs. Past MI CAD    Denies CABG/stent.     Pulmonary:   Asthma    Renal/:  Renal/ Normal     Hepatic/GI:  Hepatic/GI Normal    Endocrine:   Hypothyroidism        Physical Exam  General:  Well nourished, Obesity    Airway/Jaw/Neck:  Airway Findings: (Small non-obstrctive palatal torus. ) Mouth Opening: Normal Tongue: Normal  General Airway Assessment: Adult  Mallampati: II  TM Distance: Normal, at least 6 cm  Jaw/Neck Findings:  Neck ROM: Normal ROM      Dental:  Dental Findings: In tact   Chest/Lungs:  Chest/Lungs Findings: Clear to auscultation     Heart/Vascular:  Heart Findings: Rate: Normal  Heart murmur: negative       Mental Status:  Mental Status Findings:  Cooperative, Alert and Oriented         Anesthesia Plan  Type of Anesthesia, risks & benefits discussed:  Anesthesia Type:  general  Patient's Preference:   Intra-op Monitoring Plan: arterial line, central line and standard ASA monitors  Intra-op Monitoring Plan Comments:   Post Op Pain Control Plan:   Post Op Pain Control Plan Comments:   Induction:   IV  Beta Blocker:  Patient is on a Beta-Blocker and has received one dose within the past 24 hours (No further documentation required).       Informed Consent: Patient understands risks and agrees with Anesthesia plan.   Questions answered. Anesthesia consent signed with patient.  ASA Score: 4     Day of Surgery Review of History & Physical:    H&P update referred to the surgeon.         Ready For Surgery From Anesthesia Perspective.

## 2017-02-20 NOTE — NURSING TRANSFER
Nursing Transfer Note      2/20/2017     Transfer To: day of surgery    Transfer via wheelchair    Transfer with n/a    Transported by escort    Medicines sent: none    Chart send with patient: Yes    Notified: family at bedside    Hibiclens bath given. Ipad, fitbit, and visi removed from room. All belongings taken from room with family. Report given to DOS.

## 2017-02-20 NOTE — ANESTHESIA PROCEDURE NOTES
Arterial    Diagnosis: CAD    Patient location during procedure: done in OR  Procedure start time: 2/20/2017 1:54 PM  Timeout: 2/20/2017 1:54 PM  Procedure end time: 2/20/2017 1:54 PM  Staffing  Anesthesiologist: KEISHA NGUYEN  Resident/CRNA: KIRK GREENE  Performed by: resident/CRNA   Anesthesiologist was present at the time of the procedure.  Preanesthetic Checklist  Completed: patient identified, site marked, surgical consent, pre-op evaluation, timeout performed, IV checked, risks and benefits discussed, monitors and equipment checked and anesthesia consent given  Arterial Line  Skin Prep: chlorhexidine gluconate  Local Infiltration: lidocaine  Orientation: left  Location: radial  Catheter Size: 20 G{[facility] ANESTHESIA BLOCK ART PLACEMENTInsertion Attempts: 1  Assessment  Dressing: secured with tape and tegaderm  Patient: Tolerated well

## 2017-02-20 NOTE — PLAN OF CARE
Pt in OR for surgery. Will follow up tomorrow.        02/20/17 1232   Right Care Assessment   Can the patient answer the patient profile reliably? No historian available

## 2017-02-20 NOTE — ANESTHESIA PROCEDURE NOTES
DAMARI    Diagnosis: CAD  Patient location during procedure: OR  Procedure start time: 2/20/2017 2:40 PM  Timeout: 2/20/2017 2:40 PM  Procedure end time: 2/20/2017 2:58 PM  Exam type: Baseline  Staffing  Anesthesiologist: KEISHA NGUYEN  Other anesthesia staff: CHARLI BELTRAN  Performed by: other anesthesia staff   Preanesthetic Checklist  Completed: patient identified, surgical consent, pre-op evaluation, timeout performed, risks and benefits discussed, monitors and equipment checked, anesthesia consent given, oxygen available, suction available, hand hygiene performed and patient being monitored  Setup & Induction  Patient preparation: bite block inserted  Probe Insertion: easy  Exam: complete  Exam         LVAD:no  Estimated Ejection Fraction: > 55% normal  Regional Wall Abnormalities: no RWMA        Left Ventricle Diastolic Function    E/A Ratio: 1.1, normal (1-2)    Right Heart  Right Ventricle: normal  Right Ventricle Function: normal    Intra Atrial Septum  PFO: no shunt by color flow doppler  no IAS aneurysm  no lipomatous hypertrophy  no Atrial Septal Defect (Asd)    Right Ventricle  Size: normal, Free Wall Thickness: normal    Aortic Valve:  Stenosis: none  Morphology: trileaflet  Regurgitation: no aortic valve regurgitation     Mitral Valve:  Morphology:normal  Prolapse: none  Flail: no flail  Jet Description: trace and centrally-directed    Tricuspid Valve:  Morphology: normal  Regurgitation: none    Pulmonic Valve:  Morphology:normal  Regurgitation(color flow): none    Great Vessels  Ascending Aorta Atherosclerosis: 1=nl-min dz  Aortic Arch Atherosclerosis: 1=nl-min dz  IABP: no  Descending Aorta Atherosclerosis: 2=mild dz (<3mm)  Aorta    Descending aorta IABP: no    Effusions  Effusions: none    Summary  Findings discussed with surgeon.    Other Findings   Normal biventricular systolic function (LVEF ~60-65%). Normal LV diastolic function. Trace central MR, no AI/AS/TR. No PFO seen on color  doppler flow.

## 2017-02-20 NOTE — PLAN OF CARE
Problem: Patient Care Overview  Goal: Plan of Care Review  Outcome: Ongoing (interventions implemented as appropriate)  Plan of care discussed with patient, no new questions at this time. VSS, denies SOB, no complaint of pain. Pt NPO after midnight for CABG procedure tomorrow. Safety precautions taken, no falls/trauma during the shift. Will continue to monitor.

## 2017-02-20 NOTE — PROGRESS NOTES
Pt complaining of CP/ chest pressure. Nitro x1 given and pain resolved. VSS. Will continue to monitor.

## 2017-02-21 PROBLEM — R73.9 HYPERGLYCEMIA: Status: ACTIVE | Noted: 2017-02-21

## 2017-02-21 PROBLEM — Z95.1 S/P CABG X 3: Status: ACTIVE | Noted: 2017-02-21

## 2017-02-21 LAB
ALLENS TEST: ABNORMAL
ALLENS TEST: NORMAL
ANION GAP SERPL CALC-SCNC: 8 MMOL/L
APTT BLDCRRT: 23.7 SEC
BASOPHILS # BLD AUTO: 0.01 K/UL
BASOPHILS NFR BLD: 0.1 %
BUN SERPL-MCNC: 10 MG/DL
CALCIUM SERPL-MCNC: 8.6 MG/DL
CHLORIDE SERPL-SCNC: 109 MMOL/L
CO2 SERPL-SCNC: 23 MMOL/L
CREAT SERPL-MCNC: 0.8 MG/DL
DELSYS: ABNORMAL
DELSYS: NORMAL
DIFFERENTIAL METHOD: ABNORMAL
EOSINOPHIL # BLD AUTO: 0 K/UL
EOSINOPHIL NFR BLD: 0.1 %
ERYTHROCYTE [DISTWIDTH] IN BLOOD BY AUTOMATED COUNT: 13.6 %
ERYTHROCYTE [SEDIMENTATION RATE] IN BLOOD BY WESTERGREN METHOD: 12 MM/H
EST. GFR  (AFRICAN AMERICAN): >60 ML/MIN/1.73 M^2
EST. GFR  (NON AFRICAN AMERICAN): >60 ML/MIN/1.73 M^2
FIO2: 40
FIO2: 80
FLOW: 60
GLUCOSE SERPL-MCNC: 151 MG/DL
HCO3 UR-SCNC: 23.8 MMOL/L (ref 24–28)
HCO3 UR-SCNC: 24.8 MMOL/L (ref 24–28)
HCT VFR BLD AUTO: 33.2 %
HGB BLD-MCNC: 10.5 G/DL
INR PPP: 1
LYMPHOCYTES # BLD AUTO: 0.6 K/UL
LYMPHOCYTES NFR BLD: 3.5 %
MAGNESIUM SERPL-MCNC: 2.1 MG/DL
MCH RBC QN AUTO: 29 PG
MCHC RBC AUTO-ENTMCNC: 31.6 %
MCV RBC AUTO: 92 FL
MIN VOL: 9.3
MODE: ABNORMAL
MODE: NORMAL
MONOCYTES # BLD AUTO: 0.8 K/UL
MONOCYTES NFR BLD: 5.3 %
NEUTROPHILS # BLD AUTO: 14.2 K/UL
NEUTROPHILS NFR BLD: 90.6 %
PCO2 BLDA: 43 MMHG (ref 35–45)
PCO2 BLDA: 44 MMHG (ref 35–45)
PEEP: 5
PEEP: 5
PH SMN: 7.34 [PH] (ref 7.35–7.45)
PH SMN: 7.37 [PH] (ref 7.35–7.45)
PHOSPHATE SERPL-MCNC: 3.5 MG/DL
PIP: 25
PLATELET # BLD AUTO: 202 K/UL
PMV BLD AUTO: 12.4 FL
PO2 BLDA: 156 MMHG (ref 80–100)
PO2 BLDA: 83 MMHG (ref 80–100)
POC BE: -1 MMOL/L
POC BE: -2 MMOL/L
POC SATURATED O2: 96 % (ref 95–100)
POC SATURATED O2: 99 % (ref 95–100)
POC TCO2: 25 MMOL/L (ref 23–27)
POC TCO2: 26 MMOL/L (ref 23–27)
POCT GLUCOSE: 115 MG/DL (ref 70–110)
POCT GLUCOSE: 128 MG/DL (ref 70–110)
POCT GLUCOSE: 131 MG/DL (ref 70–110)
POCT GLUCOSE: 133 MG/DL (ref 70–110)
POCT GLUCOSE: 133 MG/DL (ref 70–110)
POCT GLUCOSE: 139 MG/DL (ref 70–110)
POCT GLUCOSE: 141 MG/DL (ref 70–110)
POCT GLUCOSE: 143 MG/DL (ref 70–110)
POCT GLUCOSE: 147 MG/DL (ref 70–110)
POCT GLUCOSE: 156 MG/DL (ref 70–110)
POCT GLUCOSE: 169 MG/DL (ref 70–110)
POCT GLUCOSE: 80 MG/DL (ref 70–110)
POCT GLUCOSE: 99 MG/DL (ref 70–110)
POTASSIUM SERPL-SCNC: 4.5 MMOL/L
PROTHROMBIN TIME: 10.6 SEC
PS: 10
PS: 10
RBC # BLD AUTO: 3.62 M/UL
SAMPLE: ABNORMAL
SAMPLE: NORMAL
SITE: ABNORMAL
SITE: NORMAL
SODIUM SERPL-SCNC: 140 MMOL/L
SP02: 100
SP02: 97
SPONT RATE: 20
VT: 500
WBC # BLD AUTO: 15.62 K/UL

## 2017-02-21 PROCEDURE — 85610 PROTHROMBIN TIME: CPT

## 2017-02-21 PROCEDURE — 63600175 PHARM REV CODE 636 W HCPCS: Performed by: STUDENT IN AN ORGANIZED HEALTH CARE EDUCATION/TRAINING PROGRAM

## 2017-02-21 PROCEDURE — 63600175 PHARM REV CODE 636 W HCPCS: Performed by: NURSE PRACTITIONER

## 2017-02-21 PROCEDURE — 97802 MEDICAL NUTRITION INDIV IN: CPT

## 2017-02-21 PROCEDURE — 25000003 PHARM REV CODE 250: Performed by: NURSE PRACTITIONER

## 2017-02-21 PROCEDURE — 82803 BLOOD GASES ANY COMBINATION: CPT

## 2017-02-21 PROCEDURE — 83735 ASSAY OF MAGNESIUM: CPT

## 2017-02-21 PROCEDURE — 25000003 PHARM REV CODE 250: Performed by: STUDENT IN AN ORGANIZED HEALTH CARE EDUCATION/TRAINING PROGRAM

## 2017-02-21 PROCEDURE — 85025 COMPLETE CBC W/AUTO DIFF WBC: CPT

## 2017-02-21 PROCEDURE — 37799 UNLISTED PX VASCULAR SURGERY: CPT

## 2017-02-21 PROCEDURE — 25000003 PHARM REV CODE 250: Performed by: EMERGENCY MEDICINE

## 2017-02-21 PROCEDURE — 80048 BASIC METABOLIC PNL TOTAL CA: CPT

## 2017-02-21 PROCEDURE — 25000003 PHARM REV CODE 250: Performed by: SURGERY

## 2017-02-21 PROCEDURE — 99900022

## 2017-02-21 PROCEDURE — 99900026 HC AIRWAY MAINTENANCE (STAT)

## 2017-02-21 PROCEDURE — 97530 THERAPEUTIC ACTIVITIES: CPT

## 2017-02-21 PROCEDURE — 84100 ASSAY OF PHOSPHORUS: CPT

## 2017-02-21 PROCEDURE — 99900017 HC EXTUBATION W/PARAMETERS (STAT)

## 2017-02-21 PROCEDURE — 63600175 PHARM REV CODE 636 W HCPCS: Performed by: SURGERY

## 2017-02-21 PROCEDURE — 94799 UNLISTED PULMONARY SVC/PX: CPT

## 2017-02-21 PROCEDURE — 20600001 HC STEP DOWN PRIVATE ROOM

## 2017-02-21 PROCEDURE — 99222 1ST HOSP IP/OBS MODERATE 55: CPT | Mod: ,,, | Performed by: NURSE PRACTITIONER

## 2017-02-21 PROCEDURE — 25000003 PHARM REV CODE 250: Performed by: INTERNAL MEDICINE

## 2017-02-21 PROCEDURE — 85730 THROMBOPLASTIN TIME PARTIAL: CPT

## 2017-02-21 PROCEDURE — 97161 PT EVAL LOW COMPLEX 20 MIN: CPT

## 2017-02-21 RX ORDER — ASPIRIN 325 MG
325 TABLET, DELAYED RELEASE (ENTERIC COATED) ORAL DAILY
Status: DISCONTINUED | OUTPATIENT
Start: 2017-02-21 | End: 2017-02-24 | Stop reason: HOSPADM

## 2017-02-21 RX ORDER — OXYCODONE HYDROCHLORIDE 5 MG/1
5 TABLET ORAL EVERY 4 HOURS PRN
Status: DISCONTINUED | OUTPATIENT
Start: 2017-02-21 | End: 2017-02-24 | Stop reason: HOSPADM

## 2017-02-21 RX ORDER — AMLODIPINE BESYLATE 5 MG/1
5 TABLET ORAL DAILY
Status: DISCONTINUED | OUTPATIENT
Start: 2017-02-21 | End: 2017-02-21

## 2017-02-21 RX ORDER — ONDANSETRON 2 MG/ML
4 INJECTION INTRAMUSCULAR; INTRAVENOUS EVERY 6 HOURS PRN
Status: DISCONTINUED | OUTPATIENT
Start: 2017-02-21 | End: 2017-02-24 | Stop reason: HOSPADM

## 2017-02-21 RX ORDER — LISINOPRIL 10 MG/1
10 TABLET ORAL DAILY
Status: DISCONTINUED | OUTPATIENT
Start: 2017-02-22 | End: 2017-02-22

## 2017-02-21 RX ORDER — IBUPROFEN 200 MG
16 TABLET ORAL
Status: DISCONTINUED | OUTPATIENT
Start: 2017-02-21 | End: 2017-02-22

## 2017-02-21 RX ORDER — IBUPROFEN 200 MG
24 TABLET ORAL
Status: DISCONTINUED | OUTPATIENT
Start: 2017-02-21 | End: 2017-02-22

## 2017-02-21 RX ORDER — FUROSEMIDE 10 MG/ML
20 INJECTION INTRAMUSCULAR; INTRAVENOUS 3 TIMES DAILY
Status: DISCONTINUED | OUTPATIENT
Start: 2017-02-21 | End: 2017-02-22

## 2017-02-21 RX ORDER — LISINOPRIL 20 MG/1
20 TABLET ORAL DAILY
Status: DISCONTINUED | OUTPATIENT
Start: 2017-02-21 | End: 2017-02-21

## 2017-02-21 RX ORDER — METOPROLOL TARTRATE 25 MG/1
25 TABLET, FILM COATED ORAL 2 TIMES DAILY
Status: DISCONTINUED | OUTPATIENT
Start: 2017-02-21 | End: 2017-02-24 | Stop reason: HOSPADM

## 2017-02-21 RX ORDER — FUROSEMIDE 10 MG/ML
20 INJECTION INTRAMUSCULAR; INTRAVENOUS 2 TIMES DAILY
Status: DISCONTINUED | OUTPATIENT
Start: 2017-02-21 | End: 2017-02-21

## 2017-02-21 RX ORDER — OXYCODONE HYDROCHLORIDE 5 MG/1
10 TABLET ORAL EVERY 4 HOURS PRN
Status: DISCONTINUED | OUTPATIENT
Start: 2017-02-21 | End: 2017-02-24 | Stop reason: HOSPADM

## 2017-02-21 RX ORDER — IPRATROPIUM BROMIDE AND ALBUTEROL SULFATE 2.5; .5 MG/3ML; MG/3ML
3 SOLUTION RESPIRATORY (INHALATION) EVERY 4 HOURS
Status: DISCONTINUED | OUTPATIENT
Start: 2017-02-21 | End: 2017-02-21

## 2017-02-21 RX ORDER — ONDANSETRON 4 MG/1
4 TABLET, ORALLY DISINTEGRATING ORAL ONCE
Status: DISCONTINUED | OUTPATIENT
Start: 2017-02-21 | End: 2017-02-21

## 2017-02-21 RX ORDER — ADHESIVE BANDAGE
30 BANDAGE TOPICAL DAILY PRN
Status: DISCONTINUED | OUTPATIENT
Start: 2017-02-21 | End: 2017-02-24 | Stop reason: HOSPADM

## 2017-02-21 RX ORDER — POLYETHYLENE GLYCOL 3350 17 G/17G
17 POWDER, FOR SOLUTION ORAL DAILY
Status: DISCONTINUED | OUTPATIENT
Start: 2017-02-22 | End: 2017-02-24 | Stop reason: HOSPADM

## 2017-02-21 RX ORDER — GLUCAGON 1 MG
1 KIT INJECTION
Status: DISCONTINUED | OUTPATIENT
Start: 2017-02-21 | End: 2017-02-22

## 2017-02-21 RX ORDER — LORAZEPAM 0.5 MG/1
0.5 TABLET ORAL ONCE
Status: DISCONTINUED | OUTPATIENT
Start: 2017-02-21 | End: 2017-02-24 | Stop reason: HOSPADM

## 2017-02-21 RX ORDER — ATORVASTATIN CALCIUM 20 MG/1
40 TABLET, FILM COATED ORAL DAILY
Status: DISCONTINUED | OUTPATIENT
Start: 2017-02-22 | End: 2017-02-24 | Stop reason: HOSPADM

## 2017-02-21 RX ORDER — ONDANSETRON 2 MG/ML
4 INJECTION INTRAMUSCULAR; INTRAVENOUS ONCE
Status: COMPLETED | OUTPATIENT
Start: 2017-02-21 | End: 2017-02-21

## 2017-02-21 RX ORDER — MORPHINE SULFATE 2 MG/ML
2 INJECTION, SOLUTION INTRAMUSCULAR; INTRAVENOUS
Status: DISCONTINUED | OUTPATIENT
Start: 2017-02-21 | End: 2017-02-21

## 2017-02-21 RX ORDER — PANTOPRAZOLE SODIUM 40 MG/1
40 TABLET, DELAYED RELEASE ORAL DAILY
Status: DISCONTINUED | OUTPATIENT
Start: 2017-02-21 | End: 2017-02-24 | Stop reason: HOSPADM

## 2017-02-21 RX ORDER — INSULIN ASPART 100 [IU]/ML
0-5 INJECTION, SOLUTION INTRAVENOUS; SUBCUTANEOUS
Status: DISCONTINUED | OUTPATIENT
Start: 2017-02-21 | End: 2017-02-22

## 2017-02-21 RX ADMIN — SODIUM CHLORIDE 1.5 UNITS/HR: 9 INJECTION, SOLUTION INTRAVENOUS at 11:02

## 2017-02-21 RX ADMIN — CEFAZOLIN SODIUM 2 G: 2 SOLUTION INTRAVENOUS at 10:02

## 2017-02-21 RX ADMIN — OXYCODONE HYDROCHLORIDE 10 MG: 5 TABLET ORAL at 08:02

## 2017-02-21 RX ADMIN — CEFAZOLIN SODIUM 2 G: 2 SOLUTION INTRAVENOUS at 06:02

## 2017-02-21 RX ADMIN — FUROSEMIDE 20 MG: 10 INJECTION, SOLUTION INTRAMUSCULAR; INTRAVENOUS at 10:02

## 2017-02-21 RX ADMIN — METOPROLOL TARTRATE 25 MG: 25 TABLET ORAL at 09:02

## 2017-02-21 RX ADMIN — POTASSIUM CHLORIDE 20 MEQ: 200 INJECTION, SOLUTION INTRAVENOUS at 12:02

## 2017-02-21 RX ADMIN — OXYCODONE HYDROCHLORIDE 10 MG: 5 TABLET ORAL at 06:02

## 2017-02-21 RX ADMIN — ASPIRIN 325 MG ORAL TABLET 325 MG: 325 PILL ORAL at 08:02

## 2017-02-21 RX ADMIN — PANTOPRAZOLE SODIUM 40 MG: 40 TABLET, DELAYED RELEASE ORAL at 08:02

## 2017-02-21 RX ADMIN — OXYCODONE HYDROCHLORIDE 10 MG: 5 TABLET ORAL at 02:02

## 2017-02-21 RX ADMIN — MORPHINE SULFATE 2 MG: 2 INJECTION, SOLUTION INTRAMUSCULAR; INTRAVENOUS at 02:02

## 2017-02-21 RX ADMIN — DOCUSATE SODIUM 100 MG: 100 CAPSULE, LIQUID FILLED ORAL at 09:02

## 2017-02-21 RX ADMIN — ONDANSETRON 4 MG: 2 INJECTION INTRAMUSCULAR; INTRAVENOUS at 06:02

## 2017-02-21 RX ADMIN — DOCUSATE SODIUM 100 MG: 100 CAPSULE, LIQUID FILLED ORAL at 08:02

## 2017-02-21 RX ADMIN — PROPOFOL 40 MCG/KG/MIN: 10 INJECTION, EMULSION INTRAVENOUS at 12:02

## 2017-02-21 RX ADMIN — LISINOPRIL 20 MG: 20 TABLET ORAL at 09:02

## 2017-02-21 RX ADMIN — FUROSEMIDE 20 MG: 10 INJECTION, SOLUTION INTRAMUSCULAR; INTRAVENOUS at 11:02

## 2017-02-21 RX ADMIN — ONDANSETRON 4 MG: 2 INJECTION INTRAMUSCULAR; INTRAVENOUS at 08:02

## 2017-02-21 RX ADMIN — ATORVASTATIN CALCIUM 80 MG: 40 TABLET, FILM COATED ORAL at 08:02

## 2017-02-21 RX ADMIN — MORPHINE SULFATE 2 MG: 2 INJECTION, SOLUTION INTRAMUSCULAR; INTRAVENOUS at 04:02

## 2017-02-21 RX ADMIN — OXYCODONE HYDROCHLORIDE 10 MG: 5 TABLET ORAL at 09:02

## 2017-02-21 RX ADMIN — ONDANSETRON 4 MG: 2 INJECTION INTRAMUSCULAR; INTRAVENOUS at 02:02

## 2017-02-21 RX ADMIN — AMLODIPINE BESYLATE 5 MG: 5 TABLET ORAL at 08:02

## 2017-02-21 RX ADMIN — MUPIROCIN 1 G: 20 OINTMENT TOPICAL at 08:02

## 2017-02-21 NOTE — NURSING
Pt transported to SICU on transport monitor and ambu bag. Pt connected to SICU [id number] monitor and vent SIMV 100%/5 PEEP. Pt with cardene and propofol infusing. All VSS, afebrile. CTS notified of pt arrival. Resident at bedside. Awaiting orders and lab results. CT with minimal drainage. Will continue to monitor.    Skin note: massive purple hematoma noted to right groin to hip area from previous cath lab procedure. Tattoos present. No other breakdown noted

## 2017-02-21 NOTE — SUBJECTIVE & OBJECTIVE
"Interval HPI:   Extubated. Starting cardiac diet with 1500 cc FR. BG at goal, no hypoglycemia. Has been on hourly BG monitoring. Afebrile, no current nausea.     PMH, PSH, FH, SH updated and reviewed     REVIEW OF SYSTEMS  Constitutional: Negative for weight changes.  Eyes: Negative for visual disturbance.  Respiratory: Negative for cough.   Cardiovascular: Positive for chest pain at surgical site.  Gastrointestinal: Negative for nausea.  Endocrine: Negative for polyuria, polydipsia.  Musculoskeletal: Negative for back pain.  Skin: Negative for rash.  Neurological: Negative for syncope.  Psychiatric/Behavioral: Negative for depression.    Current Medications and/or Treatments Impacting Glycemic Control  Immunotherapy:    Immunosuppressants     None        Steroids:   Hormones     None        Pressors:    Autonomic Drugs     None        Hyperglycemia/Diabetes Medications:   Antihyperglycemics     Start     Stop Route Frequency Ordered    02/21/17 1200  insulin regular (Humulin R) 100 Units in sodium chloride 0.9% 100 mL infusion      -- IV Continuous 02/21/17 1053    02/21/17 1153  insulin aspart pen 0-5 Units      -- SubQ As needed (PRN) 02/21/17 1053          PHYSICAL EXAMINATION:  Visit Vitals    /67    Pulse 76    Temp 97.8 °F (36.6 °C) (Oral)    Resp (!) 27    Ht 5' 4" (1.626 m)    Wt 105.2 kg (232 lb)    SpO2 98%    Breastfeeding No    BMI 39.82 kg/m2     Constitutional:  Well developed, well nourished, NAD.  ENT: External ears no masses with nose patent; normal hearing.   Neck:  Supple; trachea midline; no thyromegaly.   Cardiovascular: Normal heart sounds, no LE edema.     Lungs:  Normal effort; lungs anterior bilaterally clear to auscultation.  Abdomen:  Soft, no masses,  no hernias.  MS: No clubbing or cyanosis of nails noted; normal gait.  Skin: No rashes, lesions, or ulcers; no nodules.  Psychiatric: Good judgement and insight; normal mood and affect.    "

## 2017-02-21 NOTE — NURSING TRANSFER
Nursing Transfer Note      2/21/2017     Transfer To: 300    Transfer via bed    Transfer with 2L to O2, cardiac monitoring    Transported by RN    Medicines sent: Insulin    Chart send with patient: Yes    Notified: spouse    Patient reassessed at: 02/21/2017, 1430 (date, time)    Upon arrival to floor: cardiac monitor applied, patient oriented to room, call bell in reach and bed in lowest position

## 2017-02-21 NOTE — PT/OT/SLP EVAL
"Physical Therapy  Evaluation    Jolynn Mancia   MRN: [id number]   Admitting Diagnosis: NSTEMI (non-ST elevated myocardial infarction) s/p CABG    PT Received On: 17  PT Start Time: 954     PT Stop Time:     PT Total Time (min): 25 min       Billable Minutes:  Evaluation 15 and Therapeutic Activity 10    Diagnosis: NSTEMI (non-ST elevated myocardial infarction)  S/p CABG    Past Medical History   Diagnosis Date    Hypertension     Hypothyroidism     Panic attack     Renal disorder      Kidney stones      Past Surgical History   Procedure Laterality Date    Cystoscope      Vagina surgery      Cholecystectomy         Referring physician: Shaneka Adame MD  Date referred to PT: 2017    General Precautions: Standard, fall, sternal    Do you have any cultural, spiritual, Jew conflicts, given your current situation?: None    Patient History:  Lives With: spouse  Living Arrangements: house  Home Accessibility:  (no conerns)  Transportation Available: car, family or friend will provide  Living Environment Comment: Pt lives with her  in a H with 0 TYRON. PTA she was independent with all mobility and working full time. She has good family support of  and sisters.   Equipment Currently Used at Home: none    Previous Level of Function:  Ambulation Skills: independent  Transfer Skills: independent  ADL Skills: independent  Work/Leisure Activity: independent    Subjective:  Communicated with RN prior to session.  Pt reported "I need to cough" throughout session, requesting assist to sit up to cough.   Chief Complaint: Pain  Patient goals: To go home    Pain Ratin/10   Location - Orientation: midline  Location: sternal  Pain Addressed: Distraction, Reposition  Pain Rating Post-Intervention: 0/10    Objective:   Patient found with: pulse ox (continuous), telemetry, blood pressure cuff, central line, chest tube, holm catheter, oxygen, peripheral IV     Cognitive Exam:  Oriented to: " Person, Place, Time and Situation    Follows Commands/attention: Follows multistep  commands  Communication: clear/fluent  Safety awareness/insight to disability: intact    Physical Exam:  Postural examination/scapula alignment: Rounded shoulder and Head forward    Skin integrity: Visible skin intact  Edema: Mild in BLE    Sensation:   Intact    Upper Extremity Range of Motion:  Right Upper Extremity: WFL  Left Upper Extremity: WFL    Upper Extremity Strength:  2+/5 throughout, limited by pain    Lower Extremity Range of Motion:  Right Lower Extremity: WFL  Left Lower Extremity: WFL    Lower Extremity Strength:  Right Lower Extremity: WFL  Left Lower Extremity: WFL    Gross motor coordination: WFL    Functional Mobility:  Bed Mobility:  Scooting/Bridging: Moderate Assistance  Supine to Sit: Moderate Assistance    Transfers:  Sit <> Stand Assistance: Contact Guard Assistance  Sit <> Stand Assistive Device: No Assistive Device  Bed <> Chair Technique: Stand Pivot  Bed <> Chair Assistance: Contact Guard Assistance  Bed <> Chair Assistive Device: No Assistive Device    Gait:   Gait Distance: 2 steps to chair    Balance:   Static Sit: POOR+: Needs MINIMAL assist to maintain  Dynamic Sit: POOR: N/A  Static Stand: FAIR: Maintains without assist but unable to take challenges  Dynamic stand: FAIR: Needs CONTACT GUARD during gait    Therapeutic Activities and Exercises:  Pt sat EOB x 8 minutes with initial minimal assist progressing to stand by assist. She required mod assist to scoot to EOB in sitting.     AM-PAC 6 CLICK MOBILITY  How much help from another person does this patient currently need?   1 = Unable, Total/Dependent Assistance  2 = A lot, Maximum/Moderate Assistance  3 = A little, Minimum/Contact Guard/Supervision  4 = None, Modified [place]/Independent    Turning over in bed (including adjusting bedclothes, sheets and blankets)?: 2  Sitting down on and standing up from a chair with arms (e.g., wheelchair,  bedside commode, etc.): 3  Moving from lying on back to sitting on the side of the bed?: 2  Moving to and from a bed to a chair (including a wheelchair)?: 3  Need to walk in hospital room?: 3  Climbing 3-5 steps with a railing?: 1  Total Score: 14     AM-PAC Raw Score CMS G-Code Modifier Level of Impairment Assistance   6 % Total / Unable   7 - 9 CM 80 - 100% Maximal Assist   10 - 14 CL 60 - 80% Moderate Assist   15 - 19 CK 40 - 60% Moderate Assist   20 - 22 CJ 20 - 40% Minimal Assist   23 CI 1-20% SBA / CGA   24 CH 0% Independent/ Mod I     Patient left up in chair with all lines intact, call button in reach and RN aware.    Assessment:   Jolynn Mancia is a 52 y.o. female with a medical diagnosis of NSTEMI (non-ST elevated myocardial infarction) s/p CABG and presents with post op pain and limited independence with mobility. Anticipate she will progress well with PT and be safe to return home with family at d/c.    Rehab identified problem list/impairments: Rehab identified problem list/impairments: weakness, impaired endurance, impaired functional mobilty, gait instability, impaired balance, impaired cardiopulmonary response to activity, pain    Rehab potential is good.    Activity tolerance: Good    Discharge recommendations: Discharge Facility/Level Of Care Needs: home     Barriers to discharge:  None    Equipment recommendations: Equipment Needed After Discharge: none     GOALS:   Physical Therapy Goals        Problem: Physical Therapy Goal    Goal Priority Disciplines Outcome Goal Variances Interventions   Physical Therapy Goal     PT/OT, PT Ongoing (interventions implemented as appropriate)     Description:  Goals to be met by: 2017     Patient will increase functional independence with mobility by performin. Supine to sit with supervision  2. Sit to supine with supervision  3. Sit to stand transfer with Supervision  4. Bed to chair transfer with Supervision   5. Gait  x 150 feet with  Supervision                 PLAN:    Patient to be seen 6 x/week to address the above listed problems via therapeutic exercises, therapeutic activities, gait training  Plan of Care expires: 03/22/17  Plan of Care reviewed with: patient, spouse, family    Jessica LeJeune, PT, DPT  [phone]

## 2017-02-21 NOTE — NURSING TRANSFER
Nursing Transfer Note      2/21/2017     Transfer To: Room 300     Transfer via bed    Transfer with  to O2, cardiac monitoring    Transported by SICU RN     Medicines sent: Mupirocin ointment                                              Chart send with patient: Yes    Notified: spouse    Patient reassessed at: 1400 (1/21/2017)    Upon arrival to floor: cardiac monitor applied, patient oriented to room, call bell in reach and bed in lowest position

## 2017-02-21 NOTE — PROGRESS NOTES
"General Surgery Daily Progress Note    Jolynn Mancia  52 y.o.    Hospital Day: 6    Post Op Day: 1 Day Post-Op       Subjective  No acute events overnight. Pt seen and examined at the bedside. Vital signs stable. Patient was extubated last night. Off pressors this morning. No new complaints or concerns.     Objective  Temp:  [97.5 °F (36.4 °C)-98.9 °F (37.2 °C)]   Pulse:  [62-85]   Resp:  [12-27]   BP: ()/(46-67)   SpO2:  [94 %-100 %]   Arterial Line BP: ()/(16-61)     Labs    Recent Labs  Lab 02/21/17  0307   WBC 15.62*   RBC 3.62*   HGB 10.5*   HCT 33.2*      MCV 92   MCH 29.0   MCHC 31.6*       Recent Labs  Lab 02/21/17 0307   CALCIUM 8.6*      K 4.5   CO2 23      BUN 10   CREATININE 0.8       Recent Labs  Lab 02/21/17  0307   INR 1.0   APTT 23.7       Visit Vitals    /67    Pulse 76    Temp 97.8 °F (36.6 °C) (Oral)    Resp (!) 27    Ht 5' 4" (1.626 m)    Wt 105.2 kg (232 lb)    SpO2 98%    Breastfeeding No    BMI 39.82 kg/m2       General: Alert, no distress  Head: Normocephalic, without obvious abnormality, atraumatic  Neck: Supple  Lungs: Respirations unlabored  Heart: Regular rate and rhythm  Abdomen: soft, NT/ND, normal bowel sounds  Extremities: normal, atraumatic, no edema  Neurologic: No gross CN deficit, normal strength and sensation throughout    Imaging Results         X-Ray Chest AP Portable (Final result) Result time:  02/21/17 07:53:04    Final result by Herber Colindres III, MD (02/21/17 07:53:04)    Narrative:    One view: Central line right atrium.  Heart size is normal.  There is moderate edema, postoperative change, and no change.      Electronically signed by: HERBER COLINDRES  Date:     02/21/17  Time:    07:53             X-Ray Chest AP Portable (Final result) Result time:  02/20/17 21:28:54    Final result by William Rm MD (02/20/17 21:28:54)    Impression:     Opacity in the left lower lung central relate to atelectasis or aspiration.  " The patient status post CABG.      Electronically signed by: RJ MARIA MD  Date:     17  Time:    21:28     Narrative:    Postsurgical change    Comparison: 2/15/17.    Single frontal view the chest was obtained.    Endotracheal tube is seen with tip in good position above the tab.  Right IJ CVP line is seen with tip projecting over SVC.  NG tube is seen coursing towards the stomach.  Mediastinal drains are noted.  Sternotomy wires are new from before.  Lung volumes are low.  Density in the left lower lungs and could relate to atelectasis or aspiration.  No effusion, pneumothorax or free air below the diaphragm.  Cardiac silhouette indistinct.  The bones are similar to prior.            VAS US Carotid Bilateral (Final result) Result time:  17 09:26:46    Final result by Interface, Lab In [facility] (17 09:26:46)    Narrative:    PAT NAME: PATITO CHANG  MED REC#: [id number]  :      1965  SEX:      F  EXAM NEHEMIAH: 2017 08:26  REF PHYS: REFERRAL, SELF      Indication:  Preoperative cardiovascular examination  NSTEMI.  Results:                    Right                               Left  _____________________________________________________________________                    PSV               EDV               PSV               EDV                    [cm/s]            [cm/s]            [cm/s]            [cm/s]  CCA prox.         85                16                103               26  CCA dist.         94                20                81                21  ICA prox.         148               29                122               47  ICA dist.         64                21                100               39  ECA mid.          108               8                 140               18  Vertebral mid.    37                10                56                18    Ratio ICA / CCA   1.6               1.5               1.5               2.2    ICA Stenosis      60 - 79%                             40 - 59%  ICA Plaque        Homogeneous                         Heterogeneous  Vertebral Artery  Antegrade flow                      Antegrade flow    Report Summary:  Impression:   RIGHT SIDE:   60 - 79% right ICA stenosis.   Homogeneous plaque in the right internal carotid artery.   Antegrade flow in the right vertebral artery.   LEFT SIDE:  40 - 59% left ICA stenosis.   Heterogeneous plaque in the left internal carotid artery.   Antegrade flow in the left vertebral artery.     Sonographer: Ridge Preston RVT    Electronically Signed by:  Mary Napoles MD [[id number]]                        On: 02/20/2017 09:26            CT Chest Without Contrast (Final result) Result time:  02/18/17 14:17:24    Final result by Juvenal Owens MD (02/18/17 14:17:24)    Impression:         No significant abnormality detected.    ______________________________________     Electronically signed by resident: JUVENAL OWENS  Date:     02/18/17  Time:    13:38            As the supervising and teaching physician, I personally reviewed the images and resident's interpretation and I agree with the findings.            Electronically signed by: Navneet Burnett MD  Date:     02/18/17  Time:    14:17     Narrative:    Time of Procedure: 02/18/17 12:06:25  Accession # [id number]    Comparison: No available priors    Technique:   The chest was surveyed from the apices through the costophrenic angles.  Data was reconstructed at 5-mm increments for contiguous 5-mm images in the axial, sagittal and coronal planes and post processed for extraction of 1.25-mm images at 10-mm increments and for 8 mm maximal-intensity (MIP) images at 2 mm increments confined to the axial plane.  No additional radiation was employed.      Findings:  There is no convincing evidence of abnormality at the base of the neck.  There is left-sided arch with 3 branch vessels.  Aorta maintains normal caliber, contour and course.    Pulmonary arteries have normal caliber, contour  and distribution.  There are four pulmonary veins.  Systemic and pulmonary veno atrial connections are concordant.      There is no pleural or pericardial fluid and no lymph node enlargement.    Esophagus maintains normal caliber and course.  There is no bowel distension, free air, biliary dilatation or other significant abnormality involving structures in the upper abdomen. Gallbladder surgically absent. There is no significant abnormality of the bones or extrathoracic soft tissues.     Tracheobronchial tree reveals no significant abnormality.    Lungs are symmetrically expanded.  No significant pulmonary disease.            X-Ray Chest 1 View (Final result) Result time:  02/15/17 08:12:03    Final result by Sade Cantu MD (02/15/17 08:12:03)    Impression:      No acute cardiopulmonary process identified.      Electronically signed by: SADE CANTU MD  Date:     02/15/17  Time:    08:12     Narrative:    Chest AP single view.  Comparison: None.    Mediastinal structures are midline.  Cardiac silhouette is normal in size.  Lungs are hypoinflated which accentuates pulmonary vascular markings.  No evidence of focal consolidative process, pneumothorax, or significant effusion.  Bones appear intact.  No free air visualized beneath the diaphragm.                Assessment/Plan  52 y.o.yo female s/p AORTOCORONARY BYPASS-CABG x3 (N/A), HARVEST-VEIN-ENDOVASCULAR (N/A) 2/20/17    Neuro: alert and oriented, pain controlled  - continue po prn pain control    CV: s/p CABG, HDS off pressors, off cardene  - sternal precautions  - beta blocker  - lasix TID  - asa    Pulm: saturating well on minimal NC    Recent Labs  Lab 02/21/17  0424   PH 7.368   PCO2 43.0   PO2 83   HCO3 24.8   POCSATURATED 96   BE -1   - a capella, incentive spirometry, pulm toilet    Renal: good uop, no evidence of mariah  Recent Labs      02/21/17   0307   BUN  10   CREATININE  0.8   - continue holm till tomorrow  - trend labs    Hem/ID: h/h stable,  mild leukocytosis, afebrile  Recent Labs      02/21/17   0307   HGB  10.5*   HCT  33.2*   WBC  15.62*   - trend labs  - postop abx    Endocrine: on insulin gtt  - appreciate endo recs    FEN/GI:   - cardiac diet  - replace lytes prn  - trend labs    Dispo: HDS off pressors, off cardene, can step down  - Stepdown to [facility]      Garett Marinelli MD PGY II  [phone]

## 2017-02-21 NOTE — NURSING
RT at bedside. Weaning parameters obtained per MD. PT extubated to 8L venti mask. Pt tolerating well, all VSS. Will continue to monitor

## 2017-02-21 NOTE — PROGRESS NOTES
Pt was placed on CPAP,abg and parameters were done were adequate,pt was extubated placed on venti-mask sats were adequate..

## 2017-02-21 NOTE — PLAN OF CARE
Problem: Physical Therapy Goal  Goal: Physical Therapy Goal  Goals to be met by: 2017     Patient will increase functional independence with mobility by performin. Supine to sit with supervision  2. Sit to supine with supervision  3. Sit to stand transfer with Supervision  4. Bed to chair transfer with Supervision   5. Gait x 150 feet with Supervision   Outcome: Ongoing (interventions implemented as appropriate)  PT evaluation complete. Recommend home with family assist at d/c.

## 2017-02-21 NOTE — ASSESSMENT & PLAN NOTE
No results found for: TSH Was on Levothyroxine 25 mcg at home. Recommend resuming dose. TSH ordered.

## 2017-02-21 NOTE — TRANSFER OF CARE
"Anesthesia Transfer of Care Note    Patient: Jolynn Mancia    Procedure(s) Performed: Procedure(s) (LRB):  AORTOCORONARY BYPASS-CABG x3 (N/A)  HARVEST-VEIN-ENDOVASCULAR (N/A)    Patient location: ICU    Anesthesia Type: general    Transport from OR: Transported from OR intubated on 100% O2 by AMBU with assisted ventilation. Upon arrival to PACU/ICU, patient attached to ventilator and auscultated to confirm bilateral breath sounds and adequate TV    Post pain: adequate analgesia    Post assessment: no apparent anesthetic complications    Post vital signs: stable    Level of consciousness: sedated    Nausea/Vomiting: no nausea/vomiting    Complications: none          Last vitals:   Visit Vitals    /67    Pulse (!) 56    Temp 36.7 °C (98 °F) (Oral)    Resp 18    Ht 5' 4" (1.626 m)    Wt 105.2 kg (232 lb)    SpO2 98%    Breastfeeding No    BMI 39.82 kg/m2     "

## 2017-02-21 NOTE — PROGRESS NOTES
Pt was transferred from surgery was placed on vent settings as ordered,abg's were adequate,fio2 was weaned as tolerated with adequate sats.

## 2017-02-21 NOTE — BRIEF OP NOTE
[facility]  Brief Operative Note    SUMMARY     Surgery Date: 2/20/2017     Surgeon(s) and Role:     * Manuel Vieyra MD - Primary    Assisting Surgeon: None    Pre-op Diagnosis:  NSTEMI (non-ST elevated myocardial infarction) [I21.4]    Post-op Diagnosis:  Post-Op Diagnosis Codes:     * NSTEMI (non-ST elevated myocardial infarction) [I21.4]    Procedure(s) (LRB):  AORTOCORONARY BYPASS-CABG x3 with LIMA-LAD, SVG-D1, and SVG-OM2  11251, 77493, 40944  HARVEST-VEIN-ENDOVASCULAR (N/A)    Anesthesia: General        Description of the findings of the procedure: Good conduit. Good distal targets.     Estimated Blood Loss: 100 mL         Specimens:   Specimen     None          Attestation:  I was present and scrubbed for the entire procedure.

## 2017-02-21 NOTE — OP NOTE
DATE OF PROCEDURE:  02/20/2017.    ATTENDING SURGEON:  Manuel Vieyra M.D.    PREOPERATIVE DIAGNOSES:  1.  Coronary artery disease.  2.  Non-ST-elevation myocardial infarction.  3.  Family history of coronary artery disease.  4.  History of smoking.  5.  Hypertension.  6.  Hypothyroidism.  7.  Obesity.    POSTOPERATIVE DIAGNOSES:  1.  Coronary artery disease.  2.  Non-ST-elevation myocardial infarction.  3.  Family history of coronary artery disease.  4.  History of smoking.  5.  Hypertension.  6.  Hypothyroidism.  7.  Obesity.    OPERATION PERFORMED:  Urgent coronary artery bypass grafting x3, with left   internal mammary artery to left anterior descending, saphenous vein graft to the   first diagonal, and saphenous vein graft to the second obtuse marginal.    ANESTHESIA:  General endotracheal.    ESTIMATED BLOOD LOSS:  100 mL.    BRIEF HISTORY:  Ms. Mancia is a 52-year-old woman who initially presented to   the Emergency Department complaining of substernal chest pain.  She also had jaw   pain.  She did have a positive troponin and underwent prompt coronary   angiography, which demonstrated multivessel disease.  She now presents for   urgent coronary artery bypass grafting.    PROCEDURE IN DETAIL:  After obtaining informed and written consent, the patient   was brought to the Operating Room and placed on the operating table in supine   position.  After induction of adequate general endotracheal anesthesia, the   patient's chest, abdomen, pelvis and bilateral lower extremities were prepped   and draped in the usual sterile fashion.  Skin incisions were made over the   chest and left lower extremity.  Through the left lower extremity skin incision,   greater saphenous vein was harvested using endoscopic technique.  Once the vein   was out, the leg was closed in multiple layers of absorbable suture material.    Through the chest incision, a median sternotomy was performed.  The left   internal mammary  artery was completely dissected, but not divided.  A   pericardial well was created.  The patient was systemically heparinized.    Cannulation sutures were placed in the aorta and in the right atrial appendage.    The aortic cannula was inserted, followed by the venous.  Antegrade and   retrograde cardioplegia catheters were placed.  The mammary was divided   distally, and its end was prepared.  The patient was then put on cardiopulmonary   bypass.  Once on bypass, the aorta was crossclamped and the heart was arrested   using cold blood enhanced antegrade cardioplegia.  A prompt electromechanical   arrest was achieved.  Once the cardioplegia was all in, we first addressed the   second obtuse marginal.  This was a relatively small vessel, but was located on   the posterolateral ventricular wall.  A site suitable for grafting was located,   and an arteriotomy was made.  The vein was brought up, its end was spatulated,   and the anastomosis was performed using a running 7-0 Prolene suture.  After   completion of the anastomosis, it was tested.  It was hemostatic.  Another dose   of cardioplegia was given retrograde and down this vein.  We then turned our   attention to the first diagonal.  A site suitable for grafting was located, and   an arteriotomy was made.  The vein was brought up, its end was spatulated, and   the anastomosis was performed using a running 7-0 Prolene suture.  After   completion of the anastomosis, it was tested.  It was hemostatic.  Another dose   of cardioplegia was given retrograde and down this vein.  We then turned our   attention to the anterior wall.  The LAD was identified, and a site suitable for   grafting was located.  An arteriotomy was made, the mammary was brought up, and   the anastomosis was performed using a running 7-0 Prolene suture.  After   completion of the anastomosis, it was tested.  It was hemostatic.  Another dose   of cardioplegia was given retrograde and down the  diagonal vein.  The mammary   pedicle was tacked to the heart using two 5-0 Prolene sutures.  We then prepared   to do the two venous proximals.  Both were done in a similar fashion, using a   #11 blade to incise the aorta and an aortic punch to enlarge the aortotomies.    The veins were checked for rotation and cut to an appropriate length.  Their   ends were spatulated, and the proximal anastomoses were performed using running   5-0 Prolene suture.  After completion of the proximals, the hot shot was given   retrograde.  Root de-airing maneuvers were performed, and the aortic cross-clamp   was removed.  The patient was subsequently weaned from cardiopulmonary bypass.    The patient did separate easily from bypass.  Once off bypass, all surgical   sites were inspected.  There was good hemostasis.  The test dose of protamine   was administered, and this was well tolerated.  The total dose was then given.    FDC through the total dose, all cannulas were removed.  All surgical sites   were again inspected, and again there was good hemostasis.  Two drains were   placed in the mediastinum and one in the left chest.  Ventricular pacing wires   were placed.  After again confirming adequate hemostasis, the patient's chest   was closed using eight #6 stainless steel wires to reapproximate the sternum.    The overlying soft tissues were reapproximated using absorbable suture material.    The patient's chest was washed and dried, and a dry dressing was applied.  The   patient tolerated the procedure well.  There were no complications.  At the   conclusion of the case, sponge and instrument counts were correct.      SHAYY/USAMA  dd: 02/21/2017 10:16:51 (CST)  td: 02/21/2017 11:17:14 (CST)  Doc ID   #[id number]  Job ID #[id number]    CC:

## 2017-02-21 NOTE — H&P
History & Physical  Surgical Intensive Care    SUBJECTIVE:     Chief Complaint/Reason for Admission: CABGx3    History of Present Illness:  Patient is a 52 y.o. female who is a current smoker with a strong family history of CAD. She presented to the ED the am of 2/15/2017 with a CC of chest pain. She was in her normal state of health until 8pm the night before. She started to have jaw pain that radiated down her neck into both arms with associated chest tightness. She took Advil and relaxed and the pain subsided over the course of a few hours. The following am, she had the same pain and presented to the ED for evaluation, where she was found to have a trop of .036. Cards was consulted. She denied N/V or SOB at the time and was subsequently diagnosed with NSTEMI.     She presents to the SICU sedated and intubated S/P aortocoronary bypass-CABGx3 with LIMA-LAD, SVG-D1, and SVG-OM2 on 2/20/2017.     PTA Medications   Medication Sig    levothyroxine (SYNTHROID) 25 MCG tablet Take 25 mcg by mouth once daily.    lisinopril (PRINIVIL,ZESTRIL) 20 MG tablet Take 20 mg by mouth once daily.    nebivolol (BYSTOLIC) 5 MG Tab Take 10 mg by mouth once daily.    albuterol 90 mcg/actuation HFAA Inhale 1-2 puffs into the lungs every 6 (six) hours as needed.    ibuprofen (ADVIL,MOTRIN) 600 MG tablet Take 1 tablet (600 mg total) by mouth every 6 (six) hours as needed for Pain.       Review of patient's allergies indicates:   Allergen Reactions    Prednisone        Past Medical History   Diagnosis Date    Hypertension     Hypothyroidism     Panic attack     Renal disorder      Kidney stones     Past Surgical History   Procedure Laterality Date    Cystoscope      Vagina surgery      Cholecystectomy       History reviewed. No pertinent family history.  Social History   Substance Use Topics    Smoking status: Current Every Day Smoker     Packs/day: 0.50     Types: Cigarettes    Smokeless tobacco: None    Alcohol use No         Review of Systems:  Unable to assess given patient is sedated and intubated.     OBJECTIVE:     Vital Signs (Most Recent)  Temp: 98 °F (36.7 °C) (02/20/17 1114)  Pulse: 66 (02/20/17 2100)  Resp: (!) 21 (02/20/17 2030)  BP: 122/67 (02/20/17 1116)  SpO2: 100 % (02/20/17 2100)  Ventilator Data (Last 24H):     Vent Mode: SIMV  Oxygen Concentration (%):  [100] 100  Resp Rate Total:  [14 br/min-25 br/min] 14 br/min  Vt Set:  [500 mL] 500 mL  PEEP/CPAP:  [5 cmH20] 5 cmH20  Pressure Support:  [10 cmH20] 10 cmH20  Mean Airway Pressure:  [10 qnI63-13 cmH20] 10 cmH20    Hemodynamic Parameters (Last 24H):       Physical Exam:  General: NAD, intubated  Neuro: sedated  Pulm: CTAB, CTx2 w/ ss output  CVS: RRR  Abd: soft, non-distended  Ext: + pedal pulses bilaterally, left LE bandaged from ankle to hip, right LE with no pitting ravinder. R proximal LE with purple ecchymosis anteriorly and laterally extended to hip       Lines/Drains:       Percutaneous Central Line Insertion/Assessment - quad lumen  02/20/17 right internal jugular (Active)   Number of days:0            Peripheral IV - Single Lumen 02/20/17 1342 Right Hand (Active)   Number of days:0            Peripheral IV - Single Lumen 02/20/17 Left Forearm (Active)   Number of days:0            Arterial Line 02/20/17 1354 Left Radial (Active)   Number of days:0            Pacer Wires 02/20/17 1840 (Active)   Number of days:0            Chest Tube 02/20/17 1901 3 Left Pleural 19 Fr. (Active)   Number of days:0            NG/OG Tube 02/20/17 orogastric Center mouth (Active)   Number of days:0            Urethral Catheter 02/20/17 1405 Temperature probe;Non-latex (Active)   Number of days:0            Y Chest Tube 1 and 2 02/20/17 1902 1 Anterior Mediastinal 19 Fr. 2 Anterior Mediastinal 19 Fr. (Active)   Number of days:0       Laboratory  CBC:    Recent Labs  Lab 02/19/17  0642 02/20/17  0647 02/20/17  1702 02/20/17  1728 02/20/17  1758 02/20/17 2022   WBC 10.97 9.26  --   --    --  14.18*   HGB 12.9 12.7  --   --   --  10.1*   HCT 41.2 40.2 26* 26* 27* 30.8*    209  --   --   --  145*   MCV 93 93  --   --   --  92     BMP:    Recent Labs  Lab 02/19/17  0642 02/20/17  0646 02/20/17 2022   GLU 94 95 125*    140 139   K 4.6 4.7 3.9  3.9    108 109   CO2 26 26 23   BUN 12 15 12   CREATININE 1.0 0.9 0.9   CALCIUM 9.3 9.2 8.8   MG 1.9 1.8 2.5   PHOS 3.8 3.4 2.9     LFTs:  No results for input(s): ALT, AST, ALKPHOS, BILITOT, PROT, ALBUMIN in the last 72 hours.  COAGS:    Recent Labs  Lab 02/20/17 2022   INR 1.1   APTT 22.5     CARDIAC MARKERS:  No results for input(s): CPK, CPKMB, TROPONINI in the last 72 hours.      Chest X-Ray:     Diagnostic Results:      ASSESSMENT/PLAN:       Preventive Measures:   - protonix    Plan:    Neuro:   - sedated with propofol 50     Pulmonary:   - CXR  - Vent Mode: SIMV  Oxygen Concentration (%):  [100] 100  Resp Rate Total:  [14 br/min-25 br/min] 14 br/min  Vt Set:  [500 mL] 500 mL  PEEP/CPAP:  [5 cmH20] 5 cmH20  Pressure Support:  [10 cmH20] 10 cmH20  Mean Airway Pressure:  [10 miB33-98 cmH20] 10 cmH20    Cardiac:  - cardene 5    Heme/ID:  - aminocaproic acid 1g  - trend H/H  - trend WBC    Renal:   - trend BUN/Cr  - monitor UOP    Fluids/Electrolytes/Nutrition/GI:   - replaced lytes as needed    Endocrine:      Dispo:  - cont ICU care per primary team    Carolin Mcleod MD  PGY-1  Surgical Intensive Care Unit

## 2017-02-21 NOTE — CONSULTS
[facility]  Endocrinology  Diabetes Consult Note    Consult Requested by: Manuel Vieyra MD   Reason for admit: NSTEMI (non-ST elevated myocardial infarction)    HISTORY OF PRESENT ILLNESS:  Reason for Consult: Management of Hyperglycemia     Surgical Procedure and Date: S/P CABG x3 on 2/20/17    Diabetes diagnosis year: N/A    HPI:   Patient is a 52 y.o. female with a diagnosis of CAD after 3 month c/o chest pain that radiated to her jaws and arms. She also recently began to have dyspnea. (+) FH of CAD. No personal history of DM, but she has diabetes in her family on her father's side of the family. Endocrine consulted for hyperglycemia/insulin.          Interval HPI:   Extubated. Starting cardiac diet with 1500 cc FR. BG at goal, no hypoglycemia. Has been on hourly BG monitoring. Afebrile, no current nausea.     PMH, PSH, FH, SH updated and reviewed     REVIEW OF SYSTEMS  Constitutional: Negative for weight changes.  Eyes: Negative for visual disturbance.  Respiratory: Negative for cough.   Cardiovascular: Positive for chest pain at surgical site.  Gastrointestinal: Negative for nausea.  Endocrine: Negative for polyuria, polydipsia.  Musculoskeletal: Negative for back pain.  Skin: Negative for rash.  Neurological: Negative for syncope.  Psychiatric/Behavioral: Negative for depression.    Current Medications and/or Treatments Impacting Glycemic Control  Immunotherapy:    Immunosuppressants     None        Steroids:   Hormones     None        Pressors:    Autonomic Drugs     None        Hyperglycemia/Diabetes Medications:   Antihyperglycemics     Start     Stop Route Frequency Ordered    02/21/17 1200  insulin regular (Humulin R) 100 Units in sodium chloride 0.9% 100 mL infusion      -- IV Continuous 02/21/17 1053    02/21/17 1153  insulin aspart pen 0-5 Units      -- SubQ As needed (PRN) 02/21/17 1053          PHYSICAL EXAMINATION:  Visit Vitals    /67    Pulse 76    Temp 97.8 °F  "(36.6 °C) (Oral)    Resp (!) 27    Ht 5' 4" (1.626 m)    Wt 105.2 kg (232 lb)    SpO2 98%    Breastfeeding No    BMI 39.82 kg/m2     Constitutional:  Well developed, well nourished, NAD.  ENT: External ears no masses with nose patent; normal hearing.   Neck:  Supple; trachea midline; no thyromegaly.   Cardiovascular: Normal heart sounds, no LE edema.     Lungs:  Normal effort; lungs anterior bilaterally clear to auscultation.  Abdomen:  Soft, no masses,  no hernias.  MS: No clubbing or cyanosis of nails noted; normal gait.  Skin: No rashes, lesions, or ulcers; no nodules.  Psychiatric: Good judgement and insight; normal mood and affect.        Labs Reviewed and Include     Recent Labs  Lab 02/21/17  0307   *   CALCIUM 8.6*      K 4.5   CO2 23      BUN 10   CREATININE 0.8     Lab Results   Component Value Date    WBC 15.62 (H) 02/21/2017    HGB 10.5 (L) 02/21/2017    HCT 33.2 (L) 02/21/2017    MCV 92 02/21/2017     02/21/2017     No results for input(s): TSH, FREET4 in the last 168 hours.  No results found for: HGBA1C    Nutritional status:   Body mass index is 39.82 kg/(m^2).  Lab Results   Component Value Date    ALBUMIN 3.7 02/17/2017    ALBUMIN 3.4 (L) 02/16/2017    ALBUMIN 4.1 02/15/2017     No results found for: PREALBUMIN    Estimated Creatinine Clearance: 99.5 mL/min (based on Cr of 0.8).    Accu-Checks  Recent Labs      02/20/17   2200  02/21/17   0008  02/21/17   0223  02/21/17   0424  02/21/17   0559  02/21/17   0717  02/21/17   0813  02/21/17   0935  02/21/17   1023  02/21/17   1133   POCTGLUCOSE  141*  128*  147*  169*  156*  133*  143*  139*  131*  115*        ASSESSMENT and PLAN    Hyperglycemia  Now extubated and eating. Change to transition at 0.7 units/hr, monitor AC/HS/2A. Low dose correction scale. Anticipating resolution. A1C ordered.     Discharge Planning: anticipating resolution with no Endocrine f/u needed.       * NSTEMI (non-ST elevated myocardial " infarction)  S/P CABG x3. Keep BG controlled.       Hypothyroid  No results found for: TSH Was on Levothyroxine 25 mcg at home. Recommend resuming dose. TSH ordered.       S/P CABG x 3  Per CTS.       Obesity (BMI 30-39.9)  Body mass index is 39.82 kg/(m^2). Can worsen insulin resistance.         LATESHA Velasquez,ANP-C  Endocrinology  [facility]

## 2017-02-21 NOTE — ASSESSMENT & PLAN NOTE
Now extubated and eating. Change to transition at 0.7 units/hr, monitor AC/HS/2A. Low dose correction scale. Anticipating resolution. A1C ordered.     Discharge Planning: anticipating resolution with no Endocrine f/u needed.

## 2017-02-21 NOTE — PROGRESS NOTES
"Progress Note  Surgical Intensive Care    Admit Date: 2/15/2017  Post-operative Day: 1 Day Post-Op  Hospital Day: 7    SUBJECTIVE:     Follow-up For:  Procedure(s) (LRB):  AORTOCORONARY BYPASS-CABG x3 (N/A)  HARVEST-VEIN-ENDOVASCULAR (N/A)    HPI:  Patient is a 52 y.o. female who is a current smoker with a strong family history of CAD. She presented to the ED the am of 2/15/2017 with a CC of chest pain. She was in her normal state of health until 8pm the night before. She started to have jaw pain that radiated down her neck into both arms with associated chest tightness. She took Advil and relaxed and the pain subsided over the course of a few hours. The following am, she had the same pain and presented to the ED for evaluation, where she was found to have a trop of .036. Cards was consulted. She denied N/V or SOB at the time and was subsequently diagnosed with NSTEMI.      She presents to the SICU sedated and intubated S/P aortocoronary bypass-CABGx3 with LIMA-LAD, SVG-D1, and SVG-OM2 on 2/20/2017.      Interval history:  Extubated over night. NAEON. Patient complains of sternal pain this AM and notes the soreness feels like "1000 sit ups".    Continuous Infusions:   dextrose 5 % and 0.45 % NaCl with KCl 20 mEq 25 mL/hr at 02/21/17 0700    insulin (HUMAN R) infusion (adults) 1.6 Units/hr (02/21/17 0600)    nicardipine 7.5 mg/hr (02/21/17 0700)     Scheduled Meds:   amlodipine  5 mg Oral Daily    aspirin  325 mg Oral Daily    atorvastatin  80 mg Oral Daily    ceFAZolin 2 g/50mL Dextrose IVPB  2 g Intravenous Q8H    docusate sodium  100 mg Oral BID    furosemide  20 mg Intravenous BID    levothyroxine  25 mcg Oral Before breakfast    lisinopril  20 mg Oral Daily    metoprolol tartrate  25 mg Oral BID    mupirocin  1 g Nasal BID    pantoprazole  40 mg Oral Daily     PRN Meds:acetaminophen, dextrose 50%, dextrose 50%, magnesium sulfate IVPB, morphine, nitroGLYCERIN, ondansetron, ondansetron, oxycodone, " oxycodone, potassium chloride **AND** potassium chloride **AND** potassium chloride, sodium chloride 0.9%, sodium phosphate IVPB, sodium phosphate IVPB, sodium phosphate IVPB    Review of patient's allergies indicates:   Allergen Reactions    Prednisone        OBJECTIVE:     Vital Signs (Most Recent)  Temp: 98.9 °F (37.2 °C) (02/21/17 0700)  Pulse: 75 (02/21/17 0912)  Resp: (!) 27 (02/20/17 2045)  BP: (!) 135/58 (02/21/17 0912)  SpO2: 95 % (02/21/17 0700)    Vital Signs Range (Last 24H):  Temp:  [97.5 °F (36.4 °C)-98.9 °F (37.2 °C)]   Pulse:  [49-85]   Resp:  [12-27]   BP: ()/(46-71)   SpO2:  [94 %-100 %]   Arterial Line BP: ()/(16-60)     I & O (Last 24H):  Intake/Output Summary (Last 24 hours) at 02/21/17 0931  Last data filed at 02/21/17 0700   Gross per 24 hour   Intake           2854.5 ml   Output             2302 ml   Net            552.5 ml     Ventilator Data (Last 24H):     Vent Mode: Spont  Oxygen Concentration (%):  [] 40  Resp Rate Total:  [12 br/min-27 br/min] 19 br/min  Vt Set:  [500 mL] 500 mL  PEEP/CPAP:  [5 cmH20] 5 cmH20  Pressure Support:  [10 cmH20] 10 cmH20  Mean Airway Pressure:  [8.5 qkN63-43 cmH20] 8.5 cmH20    Hemodynamic Parameters (Last 24H):       Physical Exam:  General: NAD, in bed  Neuro: alert and oriented  Pulm: CTAB, reduced air entry,   CVS: RRR  Abd: soft, ND, NTTP  Ext: R LE without pitting edema, L LE bandaged from ankle to hip, + pedal pulses bilaterally    Wound/Incision:  Dressing clean and dry    Lines/Drains:       Percutaneous Central Line Insertion/Assessment - quad lumen  02/20/17 right internal jugular (Active)   Dressing biopatch in place;dressing dry and intact 2/21/2017  7:00 AM   Securement secured w/ sutures 2/21/2017  7:00 AM   Additional Site Signs no erythema 2/21/2017  7:00 AM   Distal Patency/Care flushed w/o difficulty;infusing 2/21/2017  3:00 AM   Medial 1 Patency/Care flushed w/o difficulty;infusing 2/21/2017  3:00 AM   Medial 2  Patency/Care flushed w/o difficulty;infusing 2/21/2017  3:00 AM   Proximal Patency/Care flushed w/o difficulty;infusing 2/21/2017  3:00 AM   Waveform normal 2/21/2017  7:00 AM   Line Interventions line leveled/zeroed 2/21/2017  7:00 AM   Dressing Change Due 02/27/17 2/21/2017  3:00 AM   Daily Line Review Performed 2/21/2017  3:00 AM   Number of days:1            Peripheral IV - Single Lumen 02/20/17 1342 Right Hand (Active)   Site Assessment Clean;Dry;Intact 2/21/2017  7:00 AM   Line Status Saline locked 2/21/2017  7:00 AM   Dressing Status Clean;Dry;Intact 2/21/2017  7:00 AM   Dressing Intervention Dressing reinforced 2/21/2017  3:00 AM   Site Change Due 02/24/17 2/20/2017  8:15 PM   Reason Not Rotated Not due 2/21/2017  7:00 AM   Number of days:0            Peripheral IV - Single Lumen 02/20/17 Left Forearm (Active)   Site Assessment Clean;Dry;Intact 2/21/2017  7:00 AM   Line Status Saline locked 2/21/2017  7:00 AM   Dressing Status Clean;Dry;Intact 2/21/2017  7:00 AM   Dressing Intervention Dressing reinforced 2/21/2017  3:00 AM   Site Change Due 02/24/17 2/20/2017  8:15 PM   Reason Not Rotated Not due 2/21/2017  7:00 AM   Number of days:1            Arterial Line 02/20/17 1354 Left Radial (Active)   Site Assessment Clean;Dry;Intact 2/21/2017  7:00 AM   Line Status Pulsatile blood flow 2/21/2017  7:00 AM   Art Line Waveform Appropriate 2/21/2017  7:00 AM   Arterial Line Interventions Zeroed and calibrated;Leveled 2/21/2017  7:00 AM   Color/Movement/Sensation Capillary refill less than 3 sec 2/21/2017  7:00 AM   Dressing Type Transparent 2/21/2017  7:00 AM   Dressing Status Clean;Dry;Intact 2/21/2017  7:00 AM   Dressing Intervention Dressing reinforced 2/21/2017  3:00 AM   Dressing Change Due 02/27/17 2/21/2017  3:00 AM   Number of days:0            Pacer Wires 02/20/17 1840 (Active)   Pacer Wire Status Pacing wires not connected to pacer 2/21/2017  7:00 AM   Site Assessment Clean;Intact;Dry 2/21/2017  3:00 AM    How Pacer Wires are Secured Ventricular wires secured to dressing 2/21/2017  7:00 AM   Dressing Status Clean;Dry;Intact 2/21/2017  7:00 AM   Dressing Intervention Dressing reinforced 2/21/2017  3:00 AM   Number of days:0            Chest Tube 02/20/17 1901 3 Left Pleural 19 Fr. (Active)   Chest Tube WDL ex 2/21/2017  7:00 AM   Function -20 cm H2O 2/21/2017  7:00 AM   Air Leak/Fluctuation air leak not present 2/21/2017  7:00 AM   Safety all tubing connections taped 2/21/2017  7:00 AM   Securement tubing anchored to body distal to insertion site w/ tape 2/21/2017  7:00 AM   Patency Intervention Stripped 2/21/2017  7:00 AM   Drainage Description Serosanguineous 2/21/2017  7:00 AM   Dressing Appearance occlusive gauze dressing intact 2/21/2017  7:00 AM   Dressing Care dressing reinforced 2/21/2017  3:00 AM   Left Subcutaneous Emphysema none present 2/21/2017  7:00 AM   Right Subcutaneous Emphysema none present 2/21/2017  7:00 AM   Site Assessment Not assessed 2/21/2017  7:00 AM   Surrounding Skin Dry;Intact 2/21/2017  7:00 AM   Output (mL) 4 mL 2/21/2017  7:00 AM   Number of days:0            Urethral Catheter 02/20/17 1405 Temperature probe;Non-latex (Active)   Site Assessment Clean;Intact 2/21/2017  7:00 AM   Collection Container Urimeter 2/21/2017  7:00 AM   Securement Method secured to top of thigh w/ adhesive device 2/21/2017  3:00 AM   Catheter Care Performed yes 2/21/2017  3:00 AM   Reason for Continuing Urinary Catheterization Post operative 2/21/2017  7:00 AM   Output (mL) 50 mL 2/21/2017  6:00 AM   Number of days:0            Y Chest Tube 1 and 2 02/20/17 1902 1 Anterior Mediastinal 19 Fr. 2 Anterior Mediastinal 19 Fr. (Active)   Function -20 cm H2O 2/21/2017  7:00 AM   Air Leak/Fluctuation air leak not present 2/21/2017  7:00 AM   Safety all tubing connections taped 2/21/2017  7:00 AM   Securement tubing anchored to body distal to insertion site w/ tape 2/21/2017  7:00 AM   Left Subcutaneous Emphysema none  present 2/21/2017  7:00 AM   Right Subcutaneous Emphysema none present 2/21/2017  7:00 AM   Patency Intervention Stripped 2/21/2017  7:00 AM   Drainage Description 1 Serosanguineous 2/21/2017  7:00 AM   Tube 1 Dressing Appearance occlusive gauze dressing intact 2/21/2017  7:00 AM   Tube 1 Dressing Care dressing reinforced 2/21/2017  3:00 AM   Site Assessment 1 Not assessed 2/21/2017  7:00 AM   Surrounding Skin 1 Dry;Intact 2/21/2017  7:00 AM   Tube 2 Dressing Appearance occlusive gauze dressing intact 2/21/2017  7:00 AM   Tube 2 Dressing Care occlusive sterile gauze dressing applied 2/21/2017  3:00 AM   Site Assessment 2 Not assessed 2/21/2017  7:00 AM   Surrounding Skin Dry;Intact 2/21/2017  7:00 AM   Output (mL) 14 mL 2/21/2017  7:00 AM   Number of days:0       Laboratory (Last 24H):  CBC:    Recent Labs  Lab 02/21/17  0307   WBC 15.62*   HGB 10.5*   HCT 33.2*        CMP:    Recent Labs  Lab 02/21/17  0307   CALCIUM 8.6*      K 4.5   CO2 23      BUN 10   CREATININE 0.8         Chest X-Ray 2/21/2017:   One view: Central line right atrium.  Heart size is normal. There is moderate edema, postoperative change, and no change.    Diagnostic Results:      ASSESSMENT/PLAN:       Plan:  Neuro:   - alert and oriented    Pulmonary:   - extubated  - 4L O2 NC  - 2/21/2017 CXR: detrimental interval change  - O2sats 95%+    Cardiac:  - cardene 7.5  - off pressors  - monitor vitals    Heme/ID:  - leukocytosis with upward trend 15.62 (from 14.18)  - stable H/H 10.5/33.2    Renal:   - adequate UOP  - BUN/Cr 10/0.8    Fluids/Electrolytes/Nutrition/GI:   - CTs with minimal output  - replace lytes as needed  - consider advancing to CLD later this AM    Endocrine:  - insulin gtt 1.6u   -     Dispo:  - step down    Carolin Mcleod MD  PGY-1  Surgical Intensive Care Unit

## 2017-02-21 NOTE — PLAN OF CARE
Problem: Patient Care Overview  Goal: Plan of Care Review  Outcome: Ongoing (interventions implemented as appropriate)  Pt transferred to SICU in the evening, extubated this AM. All VSS, pt remains afebrile. Cardene gtt titrated to keep MAP 60-80 per order. Insuling gtt infusing per order. Pt AAOx4. Complaints of pain, PRN medication administered. Minimal drainage from CT. Adequate UOP throughout shift. Spouse at bedside throughout shift. Pt and spouse updated on current plan of care, all questions answered. Will continue to monitor

## 2017-02-21 NOTE — PLAN OF CARE
Problem: Patient Care Overview  Goal: Plan of Care Review  Outcome: Ongoing (interventions implemented as appropriate)  Recommendations  1. Encourage increased PO intake.   2. If PO intake remains poor, recommend adding Boost Plus OS to all meals to increase calorie and protein intake.      RD to monitor. Full assessment completed. See RD Consult Note 2/21/17.

## 2017-02-21 NOTE — PLAN OF CARE
S/P CABG x3 on 2/20/17. CM met with pt to give her heart surgery pillow to use to splint incision to cough and deep breathe. Will follow up with dc needs.

## 2017-02-22 PROBLEM — N28.9 ACUTE RENAL INSUFFICIENCY: Status: ACTIVE | Noted: 2017-02-22

## 2017-02-22 PROBLEM — I21.4 NSTEMI (NON-ST ELEVATED MYOCARDIAL INFARCTION): Status: RESOLVED | Noted: 2017-02-15 | Resolved: 2017-02-22

## 2017-02-22 PROBLEM — I25.10 CORONARY ARTERY DISEASE INVOLVING NATIVE CORONARY ARTERY OF NATIVE HEART WITHOUT ANGINA PECTORIS: Status: ACTIVE | Noted: 2017-02-22

## 2017-02-22 PROBLEM — R07.9 CHEST PAIN: Status: RESOLVED | Noted: 2017-02-15 | Resolved: 2017-02-22

## 2017-02-22 PROBLEM — R73.03 PREDIABETES: Status: ACTIVE | Noted: 2017-02-21

## 2017-02-22 LAB
ANION GAP SERPL CALC-SCNC: 8 MMOL/L
ANISOCYTOSIS BLD QL SMEAR: SLIGHT
BASO STIPL BLD QL SMEAR: ABNORMAL
BASOPHILS # BLD AUTO: 0.01 K/UL
BASOPHILS NFR BLD: 0 %
BUN SERPL-MCNC: 17 MG/DL
CALCIUM SERPL-MCNC: 9.3 MG/DL
CHLORIDE SERPL-SCNC: 104 MMOL/L
CO2 SERPL-SCNC: 26 MMOL/L
CREAT SERPL-MCNC: 1.5 MG/DL
DIFFERENTIAL METHOD: ABNORMAL
EOSINOPHIL # BLD AUTO: 0 K/UL
EOSINOPHIL NFR BLD: 0.1 %
ERYTHROCYTE [DISTWIDTH] IN BLOOD BY AUTOMATED COUNT: 14.1 %
EST. GFR  (AFRICAN AMERICAN): 46.5 ML/MIN/1.73 M^2
EST. GFR  (NON AFRICAN AMERICAN): 40.3 ML/MIN/1.73 M^2
ESTIMATED AVG GLUCOSE: 120 MG/DL
GIANT PLATELETS BLD QL SMEAR: PRESENT
GLUCOSE SERPL-MCNC: 125 MG/DL
HBA1C MFR BLD HPLC: 5.8 %
HCT VFR BLD AUTO: 34 %
HGB BLD-MCNC: 10.8 G/DL
LYMPHOCYTES # BLD AUTO: 1 K/UL
LYMPHOCYTES NFR BLD: 4.8 %
MAGNESIUM SERPL-MCNC: 2.2 MG/DL
MCH RBC QN AUTO: 29.8 PG
MCHC RBC AUTO-ENTMCNC: 31.8 %
MCV RBC AUTO: 94 FL
MONOCYTES # BLD AUTO: 2.2 K/UL
MONOCYTES NFR BLD: 10.2 %
NEUTROPHILS # BLD AUTO: 17.9 K/UL
NEUTROPHILS NFR BLD: 84.9 %
PHOSPHATE SERPL-MCNC: 4.5 MG/DL
PLATELET # BLD AUTO: 247 K/UL
PLATELET BLD QL SMEAR: ABNORMAL
PMV BLD AUTO: 12.9 FL
POCT GLUCOSE: 119 MG/DL (ref 70–110)
POCT GLUCOSE: 140 MG/DL (ref 70–110)
POCT GLUCOSE: 175 MG/DL (ref 70–110)
POIKILOCYTOSIS BLD QL SMEAR: SLIGHT
POLYCHROMASIA BLD QL SMEAR: ABNORMAL
POTASSIUM SERPL-SCNC: 4.7 MMOL/L
RBC # BLD AUTO: 3.63 M/UL
SODIUM SERPL-SCNC: 138 MMOL/L
T4 FREE SERPL-MCNC: 0.77 NG/DL
TSH SERPL DL<=0.005 MIU/L-ACNC: 5.75 UIU/ML
WBC # BLD AUTO: 21.17 K/UL

## 2017-02-22 PROCEDURE — 25000003 PHARM REV CODE 250: Performed by: GENERAL PRACTICE

## 2017-02-22 PROCEDURE — 25000003 PHARM REV CODE 250: Performed by: NURSE PRACTITIONER

## 2017-02-22 PROCEDURE — 97530 THERAPEUTIC ACTIVITIES: CPT

## 2017-02-22 PROCEDURE — 99900035 HC TECH TIME PER 15 MIN (STAT)

## 2017-02-22 PROCEDURE — 85025 COMPLETE CBC W/AUTO DIFF WBC: CPT

## 2017-02-22 PROCEDURE — 84100 ASSAY OF PHOSPHORUS: CPT

## 2017-02-22 PROCEDURE — 83735 ASSAY OF MAGNESIUM: CPT

## 2017-02-22 PROCEDURE — 63600175 PHARM REV CODE 636 W HCPCS: Performed by: SURGERY

## 2017-02-22 PROCEDURE — 25000003 PHARM REV CODE 250: Performed by: EMERGENCY MEDICINE

## 2017-02-22 PROCEDURE — 83036 HEMOGLOBIN GLYCOSYLATED A1C: CPT

## 2017-02-22 PROCEDURE — 25000003 PHARM REV CODE 250: Performed by: STUDENT IN AN ORGANIZED HEALTH CARE EDUCATION/TRAINING PROGRAM

## 2017-02-22 PROCEDURE — 84439 ASSAY OF FREE THYROXINE: CPT

## 2017-02-22 PROCEDURE — 20600001 HC STEP DOWN PRIVATE ROOM

## 2017-02-22 PROCEDURE — 97116 GAIT TRAINING THERAPY: CPT

## 2017-02-22 PROCEDURE — 25000003 PHARM REV CODE 250: Performed by: SURGERY

## 2017-02-22 PROCEDURE — 99232 SBSQ HOSP IP/OBS MODERATE 35: CPT | Mod: ,,, | Performed by: NURSE PRACTITIONER

## 2017-02-22 PROCEDURE — 97165 OT EVAL LOW COMPLEX 30 MIN: CPT

## 2017-02-22 PROCEDURE — 80048 BASIC METABOLIC PNL TOTAL CA: CPT

## 2017-02-22 PROCEDURE — 84443 ASSAY THYROID STIM HORMONE: CPT

## 2017-02-22 PROCEDURE — 63600175 PHARM REV CODE 636 W HCPCS: Performed by: STUDENT IN AN ORGANIZED HEALTH CARE EDUCATION/TRAINING PROGRAM

## 2017-02-22 PROCEDURE — 94664 DEMO&/EVAL PT USE INHALER: CPT

## 2017-02-22 PROCEDURE — 63600175 PHARM REV CODE 636 W HCPCS: Performed by: NURSE PRACTITIONER

## 2017-02-22 RX ORDER — INSULIN ASPART 100 [IU]/ML
0-5 INJECTION, SOLUTION INTRAVENOUS; SUBCUTANEOUS
Status: DISCONTINUED | OUTPATIENT
Start: 2017-02-22 | End: 2017-02-24 | Stop reason: HOSPADM

## 2017-02-22 RX ORDER — FUROSEMIDE 10 MG/ML
40 INJECTION INTRAMUSCULAR; INTRAVENOUS 3 TIMES DAILY
Status: DISCONTINUED | OUTPATIENT
Start: 2017-02-22 | End: 2017-02-24 | Stop reason: HOSPADM

## 2017-02-22 RX ORDER — IBUPROFEN 200 MG
16 TABLET ORAL
Status: DISCONTINUED | OUTPATIENT
Start: 2017-02-22 | End: 2017-02-24 | Stop reason: HOSPADM

## 2017-02-22 RX ORDER — CALCIUM CARBONATE 200(500)MG
1000 TABLET,CHEWABLE ORAL 3 TIMES DAILY PRN
Status: DISCONTINUED | OUTPATIENT
Start: 2017-02-22 | End: 2017-02-24 | Stop reason: HOSPADM

## 2017-02-22 RX ORDER — AMLODIPINE BESYLATE 10 MG/1
10 TABLET ORAL DAILY
Status: DISCONTINUED | OUTPATIENT
Start: 2017-02-22 | End: 2017-02-23

## 2017-02-22 RX ORDER — IBUPROFEN 200 MG
24 TABLET ORAL
Status: DISCONTINUED | OUTPATIENT
Start: 2017-02-22 | End: 2017-02-24 | Stop reason: HOSPADM

## 2017-02-22 RX ORDER — FAMOTIDINE 20 MG/1
20 TABLET, FILM COATED ORAL 2 TIMES DAILY
Status: DISCONTINUED | OUTPATIENT
Start: 2017-02-22 | End: 2017-02-23

## 2017-02-22 RX ORDER — GLUCAGON 1 MG
1 KIT INJECTION
Status: DISCONTINUED | OUTPATIENT
Start: 2017-02-22 | End: 2017-02-24 | Stop reason: HOSPADM

## 2017-02-22 RX ADMIN — DOCUSATE SODIUM 100 MG: 100 CAPSULE, LIQUID FILLED ORAL at 08:02

## 2017-02-22 RX ADMIN — CALCIUM CARBONATE (ANTACID) CHEW TAB 500 MG 1000 MG: 500 CHEW TAB at 08:02

## 2017-02-22 RX ADMIN — OXYCODONE HYDROCHLORIDE 5 MG: 5 TABLET ORAL at 08:02

## 2017-02-22 RX ADMIN — CALCIUM CARBONATE (ANTACID) CHEW TAB 500 MG 1000 MG: 500 CHEW TAB at 04:02

## 2017-02-22 RX ADMIN — ASPIRIN 325 MG: 325 TABLET, DELAYED RELEASE ORAL at 08:02

## 2017-02-22 RX ADMIN — CEFAZOLIN SODIUM 2 G: 2 SOLUTION INTRAVENOUS at 06:02

## 2017-02-22 RX ADMIN — ONDANSETRON 4 MG: 2 INJECTION INTRAMUSCULAR; INTRAVENOUS at 03:02

## 2017-02-22 RX ADMIN — METOPROLOL TARTRATE 25 MG: 25 TABLET ORAL at 08:02

## 2017-02-22 RX ADMIN — LEVOTHYROXINE SODIUM 25 MCG: 25 TABLET ORAL at 06:02

## 2017-02-22 RX ADMIN — FUROSEMIDE 20 MG: 10 INJECTION, SOLUTION INTRAMUSCULAR; INTRAVENOUS at 06:02

## 2017-02-22 RX ADMIN — AMLODIPINE BESYLATE 10 MG: 10 TABLET ORAL at 08:02

## 2017-02-22 RX ADMIN — CALCIUM CARBONATE (ANTACID) CHEW TAB 500 MG 1000 MG: 500 CHEW TAB at 03:02

## 2017-02-22 RX ADMIN — FAMOTIDINE 20 MG: 20 TABLET, FILM COATED ORAL at 08:02

## 2017-02-22 RX ADMIN — FUROSEMIDE 40 MG: 10 INJECTION, SOLUTION INTRAMUSCULAR; INTRAVENOUS at 09:02

## 2017-02-22 RX ADMIN — PANTOPRAZOLE SODIUM 40 MG: 40 TABLET, DELAYED RELEASE ORAL at 08:02

## 2017-02-22 RX ADMIN — ONDANSETRON 4 MG: 2 INJECTION INTRAMUSCULAR; INTRAVENOUS at 06:02

## 2017-02-22 RX ADMIN — FUROSEMIDE 40 MG: 10 INJECTION, SOLUTION INTRAMUSCULAR; INTRAVENOUS at 03:02

## 2017-02-22 RX ADMIN — ATORVASTATIN CALCIUM 40 MG: 20 TABLET, FILM COATED ORAL at 08:02

## 2017-02-22 NOTE — PROGRESS NOTES
"[facility]  Endocrinology  Progress Note    Admit Date: 2/15/2017     Reason for Consult: Management of Hyperglycemia     Surgical Procedure and Date: S/P CABG x3 on 17    Diabetes diagnosis year: N/A    HPI:   Patient is a 52 y.o. female with a diagnosis of CAD after 3 month c/o chest pain that radiated to her jaws and arms. She also recently began to have dyspnea. (+) FH of CAD. No personal history of DM, but she has diabetes in her family on her father's side of the family. Endocrine consulted for hyperglycemia/insulin.          Interval HPI:   Overnight events: Stepped self off transition insulin infusion last night. BG at goal with no insulin needs.    Eatin% on cardiac diet.   Nausea: No  Hypoglycemia and intervention: No  Fever: No  TPN and/or TF: No    Visit Vitals    BP 99/60 (BP Location: Left arm, Patient Position: Sitting, BP Method: Automatic)    Pulse 81    Temp 97.4 °F (36.3 °C) (Oral)    Resp 18    Ht 5' 4" (1.626 m)    Wt 105.2 kg (232 lb)    SpO2 98%    Breastfeeding No    BMI 39.82 kg/m2       Labs Reviewed and Include      Recent Labs  Lab 17  0030   *   CALCIUM 9.3      K 4.7   CO2 26      BUN 17   CREATININE 1.5*     Lab Results   Component Value Date    WBC 21.17 (H) 2017    HGB 10.8 (L) 2017    HCT 34.0 (L) 2017    MCV 94 2017     2017       Recent Labs  Lab 17  0030   TSH 5.746*   FREET4 0.77     Lab Results   Component Value Date    HGBA1C 5.8 2017       Nutritional status:   Body mass index is 39.82 kg/(m^2).  Lab Results   Component Value Date    ALBUMIN 3.7 2017    ALBUMIN 3.4 (L) 2017    ALBUMIN 4.1 02/15/2017     No results found for: PREALBUMIN    Estimated Creatinine Clearance: 53.1 mL/min (based on Cr of 1.5).    Accu-Checks  Recent Labs      17   0424  17   0559  17   0717  17   0813  17   0935  17   1023  17   1133 "  02/21/17   1709  02/21/17   2232  02/22/17   0300   POCTGLUCOSE  169*  156*  133*  143*  139*  131*  115*  99  80  140*       Current Medications and/or Treatments Impacting Glycemic Control  Immunotherapy:  Immunosuppressants     None        Steroids:   Hormones     None        Pressors:    Autonomic Drugs     None        Hyperglycemia/Diabetes Medications: Antihyperglycemics     Start     Stop Route Frequency Ordered    02/21/17 1200  insulin regular (Humulin R) 100 Units in sodium chloride 0.9% 100 mL infusion      -- IV Continuous 02/21/17 1053    02/21/17 1153  insulin aspart pen 0-5 Units      -- SubQ As needed (PRN) 02/21/17 1053          ASSESSMENT and PLAN    Prediabetes  BG goal 110-140. BG at goal with no insulin needs.   Continue BG monitoring AC/HS with low dose correction scale.   Will sign off tomorrow if BG remains at goal.     Discharge Planning: anticipating resolution of hyperglycemia. She does have prediabetes, which is new to her. Discussed with patient. Recommend diet and exercise. F/u with pcp for repeat A1c in 3-6 months.   Lab Results   Component Value Date    HGBA1C 5.8 02/22/2017       * NSTEMI (non-ST elevated myocardial infarction)  S/P CABG x3. Keep BG controlled.       Coronary artery disease involving native coronary artery of native heart without angina pectoris  Avoid hypoglycemia.   S/p CABGx3      S/P CABG x 3  Per CTS.       Hypothyroid  Lab Results   Component Value Date    TSH 5.746 (H) 02/22/2017    Was on Levothyroxine 25 mcg at home. Recommend resuming dose.    TSH elevated. She currently takes her synthroid at night with food.   Reviewed correct synthroid administration (in am, on an empty stomach, and wait at least 30 minutes to eat/drink). Recommend repeating TSH in 8 weeks.     Obesity (BMI 30-39.9)  Body mass index is 39.82 kg/(m^2). Can worsen insulin resistance.         Shanell Harris NP  Endocrinology  [facility]

## 2017-02-22 NOTE — PROGRESS NOTES
Progress Note  Cardiothoracic Surgery    Admit Date: 2/15/2017  Post-operative Day: 2 Days Post-Op  Hospital Day: 8    SUBJECTIVE:  No acute events overnight.  SR per monitor.     Follow-up For: Procedure(s) (LRB):  AORTOCORONARY BYPASS-CABG x3 (N/A)  HARVEST-VEIN-ENDOVASCULAR (N/A)    Scheduled Meds:   amlodipine  10 mg Oral Daily    aspirin  325 mg Oral Daily    atorvastatin  40 mg Oral Daily    docusate sodium  100 mg Oral BID    famotidine  20 mg Oral BID    furosemide  40 mg Intravenous TID    levothyroxine  25 mcg Oral Before breakfast    lorazepam  0.5 mg Oral Once    metoprolol tartrate  25 mg Oral BID    pantoprazole  40 mg Oral Daily    polyethylene glycol  17 g Oral Daily     Infusions/Drips:   PRN Meds: calcium carbonate, dextrose 50%, dextrose 50%, glucagon (human recombinant), glucose, glucose, insulin aspart, magnesium hydroxide 400 mg/5 ml, ondansetron, oxycodone, oxycodone    Review of patient's allergies indicates:   Allergen Reactions    Prednisone        OBJECTIVE:     Vital Signs (Most Recent)  Temp: 98.2 °F (36.8 °C) (02/22/17 1500)  Pulse: 80 (02/22/17 1600)  Resp: 18 (02/22/17 1100)  BP: (!) 104/58 (02/22/17 1500)  SpO2: (!) 93 % (02/22/17 1615)    Admission Weight: 107 kg (236 lb) (02/15/17 0640)   Most Recent Weight: 105.2 kg (232 lb) (02/20/17 1114)    Vital Signs Range (Last 24H):  Temp:  [97.4 °F (36.3 °C)-98.2 °F (36.8 °C)]   Pulse:  [72-92]   Resp:  [18-22]   BP: ()/(53-74)   SpO2:  [92 %-99 %]     I & O (Last 24H):  Intake/Output Summary (Last 24 hours) at 02/22/17 1637  Last data filed at 02/22/17 1400   Gross per 24 hour   Intake           1727.3 ml   Output              440 ml   Net           1287.3 ml     Physical Exam:  General: no distress  Head: normocephalic  Lungs:  normal respiratory effort  Heart: regular rate and rhythm  Abdomen: soft/nontender   Extremities: warm, well perfused  Skin: Skin color, texture, turgor normal. No rashes or  lesions    Wound/Incision:  clean, dry, intact    Laboratory:  CBC:   Recent Labs  Lab 02/22/17 0030   WBC 21.17*   RBC 3.63*   HGB 10.8*   HCT 34.0*      MCV 94   MCH 29.8   MCHC 31.8*     BMP:   Recent Labs  Lab 02/22/17  0030   *      K 4.7      CO2 26   BUN 17   CREATININE 1.5*   CALCIUM 9.3   MG 2.2       Diagnostic Results:  Chest xray reviewed    ASSESSMENT/PLAN:     Assessment:   Active Hospital Problems    Diagnosis    *S/P CABG x 3    Coronary artery disease involving native coronary artery of native heart without angina pectoris    Acute renal insufficiency    Prediabetes    Anxiety    HTN (hypertension), benign    Obesity (BMI 30-39.9)    Hypothyroid    Tobacco abuse       Plan:   Sternal precautions  PT/Ot  Ambulate  DC mediastinal chest tubes  Monitor drainage from pleural chest tubes  Monitor renal function  DC lisinopril given bump in creatinine  Monitor blood pressure  Continue beta blocker  Daily weights  1800 kirsten ADA diet  Continue thyroid replacement  Discharge planning ongoing

## 2017-02-22 NOTE — PT/OT/SLP EVAL
"Occupational Therapy  Evaluation    Jolynn Mancia   MRN: [id number]   Admitting Diagnosis: NSTEMI (non-ST elevated myocardial infarction)    OT Date of Treatment: 17   OT Start Time: 1312  OT Stop Time: 1327  OT Total Time (min): 15 min    Billable Minutes:  Evaluation 7  Therapeutic Activity 8    Diagnosis: NSTEMI (non-ST elevated myocardial infarction)     Past Medical History   Diagnosis Date    Hypertension     Hypothyroidism     Panic attack     Renal disorder      Kidney stones      Past Surgical History   Procedure Laterality Date    Cystoscope      Vagina surgery      Cholecystectomy         Referring physician: JIMI Driscoll  Date referred to OT: 17    General Precautions: Standard, fall, sternal  Orthopedic Precautions: N/A  Braces: N/A    Do you have any cultural, spiritual, Taoist conflicts, given your current situation?: None     Patient History:  Living Environment  Lives With: spouse  Living Arrangements: house  Living Environment Comment: Pt lives with  in 1-story house with 0 TYRON. PTA, pt was (I) with ADLs and mobility and working full-times. She has good family support of  and sisters.  Equipment Currently Used at Home: none    Prior level of function:   Bed Mobility/Transfers: independent  Grooming: independent  Bathing: independent  Upper Body Dressing: independent  Lower Body Dressing: independent  Toileting: independent  Home Management Skills: independent  Driving License: Yes  Occupation: Full time employment     Dominant hand: right    Subjective:  Communicated with RN prior to session.  "I'm feeling a little loopy."    Chief Complaint: None stated  Patient/Family stated goals: Return home    Pain Ratin/10 (reports feeling "loopy" from pain meds)  Pain Rating Post-Intervention: 0/10    Objective:  Patient found with: telemetry, oxygen, chest tube    Cognitive Exam:  Oriented to: Person, Place, Time and Situation  Follows Commands/attention: Follows " multistep commands  Communication: clear/fluent  Memory:  No Deficits noted  Safety awareness/insight to disability: intact  Coping skills/emotional control: Appropriate to situation    Visual/perceptual:  Intact    Physical Exam:  Postural examination/scapula alignment: No postural abnormalities identified  Skin integrity: Visible skin intact  Edema: None noted     Sensation:   Intact    Upper Extremity Range of Motion:  Right Upper Extremity: WFL  Left Upper Extremity: WFL    Upper Extremity Strength:  Right Upper Extremity: NT due to sternal precautions  Left Upper Extremity: NT due to sternal precautions   Strength: WFL    Functional Mobility:  Bed Mobility: Pt up in chair and left up in chair     Transfers:  Sit <> Stand Assistance: Stand By Assistance from bedside chair x 3 trials  Sit <> Stand Assistive Device: No Assistive Device    Functional Ambulation: Within room and hallway with CGA no AD    Activities of Daily Living:  UE Dressing Level of Assistance: Minimum assistance to don gown around back  LE Dressing Level of Assistance: Total assistance to don socks    Balance:   Static Sit: NORMAL: No deviations seen in posture held statically  Dynamic Sit: GOOD+: Maintains balance through MAXIMAL excursions of active trunk motion  Static Stand: GOOD: Takes MODERATE challenges from all directions  Dynamic stand: FAIR+: Needs CLOSE SUPERVISION during gait and is able to right self with minor LOB    Therapeutic Activities and Exercises:  OT marta; white board updated; educated on OT role and POC; performed functional mobility in room and hallway    AM-PAC 6 CLICK ADL  How much help from another person does this patient currently need?  1 = Unable, Total/Dependent Assistance  2 = A lot, Maximum/Moderate Assistance  3 = A little, Minimum/Contact Guard/Supervision  4 = None, Modified [place]/Independent    Putting on and taking off regular lower body clothing? : 2  Bathing (including washing, rinsing,  "drying)?: 3  Toileting, which includes using toilet, bedpan, or urinal? : 3  Putting on and taking off regular upper body clothing?: 3  Taking care of personal grooming such as brushing teeth?: 4  Eating meals?: 4  Total Score: 19    AM-PAC Raw Score CMS "G-Code Modifier Level of Impairment Assistance   6 % Total / Unable   7 - 9 CM 80 - 100% Maximal Assist   10 - 14 CL 60 - 80% Moderate Assist   15 - 19 CK 40 - 60% Moderate Assist   20 - 22 CJ 20 - 40% Minimal Assist   23 CI 1-20% SBA / CGA   24 CH 0% Independent/ Mod I       Patient left up in chair with all lines intact, call button in reach and  present    Assessment:  Jolynn Mancia is a 52 y.o. female with a medical diagnosis of NSTEMI (non-ST elevated myocardial infarction) and presents with decreased strength and endurance needed for ADLs and functional mobility. Pt would continue to benefit from OT to increase independence. Safe to return home with no DME needs.    Rehab identified problem list/impairments: Rehab identified problem list/impairments: weakness, impaired endurance, impaired self care skills, impaired functional mobilty, gait instability, impaired balance, impaired cardiopulmonary response to activity    Rehab potential is good.    Activity tolerance: Good    Discharge recommendations: Discharge Facility/Level Of Care Needs: home     Barriers to discharge: Barriers to Discharge: None    Equipment recommendations: none     GOALS:   Occupational Therapy Goals        Problem: Occupational Therapy Goal    Goal Priority Disciplines Outcome Interventions   Occupational Therapy Goal     OT, PT/OT Ongoing (interventions implemented as appropriate)    Description:  Goals to be met by: 7 days (3/1/17)     Patient will increase functional independence with ADLs by performing:    UE Dressing with Supervision.  LE Dressing with Supervision.  Grooming while standing at sink with Supervision.  Toileting from toilet with Supervision for hygiene " and clothing management.   Supine to sit with Supervision.  Toilet transfer to toilet with Supervision.  Increased functional strength to WFL for ADLs.                PLAN:  Patient to be seen 4 x/week to address the above listed problems via self-care/home management, therapeutic activities, therapeutic exercises  Plan of Care expires: 03/22/17  Plan of Care reviewed with: patient, spouse         SUSAN Wright  02/22/2017

## 2017-02-22 NOTE — ASSESSMENT & PLAN NOTE
Lab Results   Component Value Date    TSH 5.746 (H) 02/22/2017    Was on Levothyroxine 25 mcg at home. Recommend resuming dose.    TSH elevated. She currently takes her synthroid at night with food.   Reviewed correct synthroid administration (in am, on an empty stomach, and wait 30 minutes to eat/drink). Recommend repeating TSH in 8 weeks.

## 2017-02-22 NOTE — SUBJECTIVE & OBJECTIVE
"Interval HPI:   Overnight events: Stepped self off transition insulin infusion last night. BG at goal with no insulin needs.    Eatin% on cardiac diet.   Nausea: No  Hypoglycemia and intervention: No  Fever: No  TPN and/or TF: No    Visit Vitals    BP 99/60 (BP Location: Left arm, Patient Position: Sitting, BP Method: Automatic)    Pulse 81    Temp 97.4 °F (36.3 °C) (Oral)    Resp 18    Ht 5' 4" (1.626 m)    Wt 105.2 kg (232 lb)    SpO2 98%    Breastfeeding No    BMI 39.82 kg/m2       Labs Reviewed and Include      Recent Labs  Lab 17  0030   *   CALCIUM 9.3      K 4.7   CO2 26      BUN 17   CREATININE 1.5*     Lab Results   Component Value Date    WBC 21.17 (H) 2017    HGB 10.8 (L) 2017    HCT 34.0 (L) 2017    MCV 94 2017     2017       Recent Labs  Lab 17  0030   TSH 5.746*   FREET4 0.77     Lab Results   Component Value Date    HGBA1C 5.8 2017       Nutritional status:   Body mass index is 39.82 kg/(m^2).  Lab Results   Component Value Date    ALBUMIN 3.7 2017    ALBUMIN 3.4 (L) 2017    ALBUMIN 4.1 02/15/2017     No results found for: PREALBUMIN    Estimated Creatinine Clearance: 53.1 mL/min (based on Cr of 1.5).    Accu-Checks  Recent Labs      17   0424  17   0559  17   0717  17   0813  17   0935  17   1023  17   1133  17   1709  17   2232  17   0300   POCTGLUCOSE  169*  156*  133*  143*  139*  131*  115*  99  80  140*       Current Medications and/or Treatments Impacting Glycemic Control  Immunotherapy:  Immunosuppressants     None        Steroids:   Hormones     None        Pressors:    Autonomic Drugs     None        Hyperglycemia/Diabetes Medications: Antihyperglycemics     Start     Stop Route Frequency Ordered    17 1200  insulin regular (Humulin R) 100 Units in sodium chloride 0.9% 100 mL infusion      -- IV Continuous 17 1053    " 02/21/17 1153  insulin aspart pen 0-5 Units      -- SubQ As needed (PRN) 02/21/17 1051

## 2017-02-22 NOTE — ASSESSMENT & PLAN NOTE
BG goal 110-140. BG at goal with no insulin needs.   Continue BG monitoring AC/HS   Will sign off. Thank you for this consult. Please re-consult if needed.     Discharge Planning: anticipating resolution of hyperglycemia. She does have prediabetes, which is new to her. Discussed with patient. Recommend diet and exercise. F/u with pcp for repeat A1c in 3-6 months.   Lab Results   Component Value Date    HGBA1C 5.8 02/22/2017

## 2017-02-22 NOTE — PLAN OF CARE
Problem: Occupational Therapy Goal  Goal: Occupational Therapy Goal  Goals to be met by: 7 days (3/1/17)     Patient will increase functional independence with ADLs by performing:    UE Dressing with Supervision.  LE Dressing with Supervision.  Grooming while standing at sink with Supervision.  Toileting from toilet with Supervision for hygiene and clothing management.   Supine to sit with Supervision.  Toilet transfer to toilet with Supervision.  Increased functional strength to WFL for ADLs.  Outcome: Ongoing (interventions implemented as appropriate)  Eval and POC set 2/22/17

## 2017-02-22 NOTE — PT/OT/SLP PROGRESS
"Physical Therapy  Treatment    Jolynn Mancia   MRN: [id number]   Admitting Diagnosis: NSTEMI (non-ST elevated myocardial infarction)    PT Received On: 02/22/17  PT Start Time: 0803     PT Stop Time: 0830    PT Total Time (min): 27 min       Billable Minutes:  Gait [id number] and Therapeutic Activity 10    Treatment Type: Treatment  PT/PTA: PT     PTA Visit Number: 0       General Precautions: Standard, fall, sternal  Orthopedic Precautions: N/A   Braces: N/A    Do you have any cultural, spiritual, Gnosticism conflicts, given your current situation?: None    Subjective:  Communicated with RN prior to session.  "I need Tums."    Pain Rating:  (Pt denies pain but reports indigestion.)    Objective:   Patient found with: telemetry, oxygen, peripheral IV, chest tube    Functional Mobility:  Bed Mobility: not performed 2* pt UIC this session      Transfers:  Sit <> Stand Assistance: Stand By Assistance  Sit <> Stand Assistive Device: No Assistive Device    Gait:   Gait Distance: ~60 ft.  Assistance 1: Contact Guard Assistance  Gait Assistive Device: Hand held assist  Gait Pattern: 2-point gait  Gait Deviation(s): decreased juan, increased time in double stance, decreased velocity of limb motion, decreased step length, decreased weight-shifting ability  -6L portable O2 in place throughout  -Further ambulation limited by dizziness. Pt cued on pursed lip breathing.     Balance:   Static Sit: GOOD: Takes MODERATE challenges from all directions  Dynamic Sit: GOOD-: Maintains balance through MODERATE excursions of active trunk movement,     Static Stand: GOOD-: Takes MODERATE challenges from all directions inconsistently  Dynamic stand: FAIR: Needs CONTACT GUARD during gait     Therapeutic Activities and Exercises:  Reviewed sternal precautions. Pt able to maintain throughout session with min cueing.   Pt educated on PT POC.      AM-PAC 6 CLICK MOBILITY  How much help from another person does this patient currently need?   1 " = Unable, Total/Dependent Assistance  2 = A lot, Maximum/Moderate Assistance  3 = A little, Minimum/Contact Guard/Supervision  4 = None, Modified [place]/Independent    Turning over in bed (including adjusting bedclothes, sheets and blankets)?: 3  Sitting down on and standing up from a chair with arms (e.g., wheelchair, bedside commode, etc.): 3  Moving from lying on back to sitting on the side of the bed?: 3  Moving to and from a bed to a chair (including a wheelchair)?: 3  Need to walk in hospital room?: 3  Climbing 3-5 steps with a railing?: 2  Total Score: 17    AM-PAC Raw Score CMS G-Code Modifier Level of Impairment Assistance   6 % Total / Unable   7 - 9 CM 80 - 100% Maximal Assist   10 - 14 CL 60 - 80% Moderate Assist   15 - 19 CK 40 - 60% Moderate Assist   20 - 22 CJ 20 - 40% Minimal Assist   23 CI 1-20% SBA / CGA   24 CH 0% Independent/ Mod I     Patient left up in chair with all lines intact, call button in reach, RN notified and pt's  and NP present.    Assessment:  Jolynn Mancia is a 52 y.o. female with a medical diagnosis of NSTEMI (non-ST elevated myocardial infarction) and presents with s/p CABG 2/20. Pt progressing ambulation distance and ability to perform mobility with less assist. Further ambulation was limited by onset of dizziness, requiring cues on pursed lip breathing and return to seated position. [place] with functional mobility remains limited by impaired endurance and generalized weakness. Pt would continue to benefit from skilled IP PT in order to address current deficits and progress functional mobility.     Rehab identified problem list/impairments: Rehab identified problem list/impairments: weakness, gait instability, impaired endurance, impaired balance, impaired cardiopulmonary response to activity, impaired self care skills, impaired functional mobilty    Rehab potential is good.    Activity tolerance: Fair    Discharge recommendations: Discharge  Facility/Level Of Care Needs: home     Barriers to discharge: Barriers to Discharge: None    Equipment recommendations: Equipment Needed After Discharge: none     GOALS:   Physical Therapy Goals        Problem: Physical Therapy Goal    Goal Priority Disciplines Outcome Goal Variances Interventions   Physical Therapy Goal     PT/OT, PT Ongoing (interventions implemented as appropriate)     Description:  Goals to be met by: 2017     Patient will increase functional independence with mobility by performin. Supine to sit with supervision  2. Sit to supine with supervision  3. Sit to stand transfer with Supervision  4. Bed to chair transfer with Supervision   5. Gait  x 150 feet with Supervision                 PLAN:    Patient to be seen 6 x/week  to address the above listed problems via gait training, therapeutic activities, therapeutic exercises  Plan of Care expires: 17  Plan of Care reviewed with: patient, spouse        Marisela Jain, PT, DPT   2017  [phone]

## 2017-02-22 NOTE — PLAN OF CARE
Problem: Patient Care Overview  Goal: Individualization & Mutuality  Hx: HTN, hypothyroid, anxiety, kidney stones, CAD, smoker, angina    2/15: ER, NSTEMI  2/16: angiogram  2/20: CABG x3  2/21: extubated   Nursing:  MAP 60-80   Plan of care discussed with patient.  Patient ambulating independently, fall precautions in place.Continuing to encourage sternal precautions, IS, and ambulation. Patient complains of pain but is controlled with medication. Discussed medications and care. Patient has no questions at this time. Will continue to monitor.

## 2017-02-22 NOTE — CONSULTS
"Cardiac Rehab     Jolynn Mancia   [id number]   2/22/2017       Activity taught: Yes    Cardiac Rehab Phase Taught: Phase I & II    Risk Factors-Modifiable: nutrition, hypertension, obesity, sedentary lifestyle, tobacco use, stress, exercise    Risk Factors-Non modifiable: none    Teaching Method: Verbal, Written, Living with Heart Disease    Understanding: Yes    Response: Pt and  verbalized understanding. All questions answered    Comments: S/P CABG. Discussed cardiac rehab and risk factor modification. Team to refer patient to [facility] Phase II. Educational materials were used in the process and given to the patient. They included "Your Guide to Living with Heart Disease", Phase Two Cardiac Rehabilitation information along with a sample Mediterranean diet.The patient expressed understanding of the teaching and expressed desire to take a role in modifying the risk factors when they return home.      Sagar Garcia RN  Cardiac Rehab  "

## 2017-02-22 NOTE — PLAN OF CARE
Problem: Physical Therapy Goal  Goal: Physical Therapy Goal  Goals to be met by: 2017     Patient will increase functional independence with mobility by performin. Supine to sit with supervision  2. Sit to supine with supervision  3. Sit to stand transfer with Supervision  4. Bed to chair transfer with Supervision   5. Gait x 150 feet with Supervision    Outcome: Ongoing (interventions implemented as appropriate)  Goals reviewed and remain appropriate. Pt progressing towards goals.     Marsiela Jain, PT, DPT   2017  [phone]

## 2017-02-23 LAB
ANION GAP SERPL CALC-SCNC: 7 MMOL/L
BASOPHILS # BLD AUTO: 0.02 K/UL
BASOPHILS NFR BLD: 0.1 %
BUN SERPL-MCNC: 26 MG/DL
CALCIUM SERPL-MCNC: 9.3 MG/DL
CHLORIDE SERPL-SCNC: 102 MMOL/L
CO2 SERPL-SCNC: 28 MMOL/L
CREAT SERPL-MCNC: 1.2 MG/DL
DIFFERENTIAL METHOD: ABNORMAL
EOSINOPHIL # BLD AUTO: 0.1 K/UL
EOSINOPHIL NFR BLD: 0.4 %
ERYTHROCYTE [DISTWIDTH] IN BLOOD BY AUTOMATED COUNT: 14 %
EST. GFR  (AFRICAN AMERICAN): >60 ML/MIN/1.73 M^2
EST. GFR  (NON AFRICAN AMERICAN): 52.8 ML/MIN/1.73 M^2
GLUCOSE SERPL-MCNC: 108 MG/DL
HCT VFR BLD AUTO: 30.3 %
HGB BLD-MCNC: 9.7 G/DL
LYMPHOCYTES # BLD AUTO: 1.3 K/UL
LYMPHOCYTES NFR BLD: 7.6 %
MAGNESIUM SERPL-MCNC: 1.9 MG/DL
MCH RBC QN AUTO: 29.8 PG
MCHC RBC AUTO-ENTMCNC: 32 %
MCV RBC AUTO: 93 FL
MONOCYTES # BLD AUTO: 1.6 K/UL
MONOCYTES NFR BLD: 9.2 %
NEUTROPHILS # BLD AUTO: 14.2 K/UL
NEUTROPHILS NFR BLD: 82.3 %
PHOSPHATE SERPL-MCNC: 3.3 MG/DL
PLATELET # BLD AUTO: 219 K/UL
PMV BLD AUTO: 12.7 FL
POCT GLUCOSE: 143 MG/DL (ref 70–110)
POTASSIUM SERPL-SCNC: 4.5 MMOL/L
RBC # BLD AUTO: 3.25 M/UL
SODIUM SERPL-SCNC: 137 MMOL/L
WBC # BLD AUTO: 17.2 K/UL

## 2017-02-23 PROCEDURE — 84100 ASSAY OF PHOSPHORUS: CPT

## 2017-02-23 PROCEDURE — 20600001 HC STEP DOWN PRIVATE ROOM

## 2017-02-23 PROCEDURE — 85025 COMPLETE CBC W/AUTO DIFF WBC: CPT

## 2017-02-23 PROCEDURE — 97116 GAIT TRAINING THERAPY: CPT

## 2017-02-23 PROCEDURE — 80048 BASIC METABOLIC PNL TOTAL CA: CPT

## 2017-02-23 PROCEDURE — 97803 MED NUTRITION INDIV SUBSEQ: CPT

## 2017-02-23 PROCEDURE — 25000003 PHARM REV CODE 250: Performed by: NURSE PRACTITIONER

## 2017-02-23 PROCEDURE — 94761 N-INVAS EAR/PLS OXIMETRY MLT: CPT

## 2017-02-23 PROCEDURE — 94664 DEMO&/EVAL PT USE INHALER: CPT

## 2017-02-23 PROCEDURE — 25000003 PHARM REV CODE 250: Performed by: SURGERY

## 2017-02-23 PROCEDURE — 27000221 HC OXYGEN, UP TO 24 HOURS

## 2017-02-23 PROCEDURE — 63600175 PHARM REV CODE 636 W HCPCS: Performed by: NURSE PRACTITIONER

## 2017-02-23 PROCEDURE — 25000003 PHARM REV CODE 250: Performed by: EMERGENCY MEDICINE

## 2017-02-23 PROCEDURE — 25000003 PHARM REV CODE 250: Performed by: STUDENT IN AN ORGANIZED HEALTH CARE EDUCATION/TRAINING PROGRAM

## 2017-02-23 PROCEDURE — 83735 ASSAY OF MAGNESIUM: CPT

## 2017-02-23 PROCEDURE — 99231 SBSQ HOSP IP/OBS SF/LOW 25: CPT | Mod: ,,, | Performed by: NURSE PRACTITIONER

## 2017-02-23 PROCEDURE — 25000003 PHARM REV CODE 250: Performed by: GENERAL PRACTICE

## 2017-02-23 RX ORDER — FAMOTIDINE 40 MG/5ML
20 POWDER, FOR SUSPENSION ORAL 2 TIMES DAILY
Status: DISCONTINUED | OUTPATIENT
Start: 2017-02-23 | End: 2017-02-24 | Stop reason: HOSPADM

## 2017-02-23 RX ORDER — ACETAMINOPHEN 325 MG/1
650 TABLET ORAL EVERY 6 HOURS PRN
Status: DISCONTINUED | OUTPATIENT
Start: 2017-02-23 | End: 2017-02-24 | Stop reason: HOSPADM

## 2017-02-23 RX ADMIN — METOPROLOL TARTRATE 25 MG: 25 TABLET ORAL at 09:02

## 2017-02-23 RX ADMIN — ONDANSETRON 4 MG: 2 INJECTION INTRAMUSCULAR; INTRAVENOUS at 05:02

## 2017-02-23 RX ADMIN — ONDANSETRON 4 MG: 2 INJECTION INTRAMUSCULAR; INTRAVENOUS at 08:02

## 2017-02-23 RX ADMIN — OXYCODONE HYDROCHLORIDE 10 MG: 5 TABLET ORAL at 09:02

## 2017-02-23 RX ADMIN — POLYETHYLENE GLYCOL 3350 17 G: 17 POWDER, FOR SOLUTION ORAL at 08:02

## 2017-02-23 RX ADMIN — PANTOPRAZOLE SODIUM 40 MG: 40 TABLET, DELAYED RELEASE ORAL at 08:02

## 2017-02-23 RX ADMIN — DOCUSATE SODIUM 100 MG: 100 CAPSULE, LIQUID FILLED ORAL at 09:02

## 2017-02-23 RX ADMIN — LEVOTHYROXINE SODIUM 25 MCG: 25 TABLET ORAL at 05:02

## 2017-02-23 RX ADMIN — METOPROLOL TARTRATE 25 MG: 25 TABLET ORAL at 08:02

## 2017-02-23 RX ADMIN — ASPIRIN 325 MG: 325 TABLET, DELAYED RELEASE ORAL at 08:02

## 2017-02-23 RX ADMIN — FUROSEMIDE 40 MG: 10 INJECTION, SOLUTION INTRAMUSCULAR; INTRAVENOUS at 03:02

## 2017-02-23 RX ADMIN — FUROSEMIDE 40 MG: 10 INJECTION, SOLUTION INTRAMUSCULAR; INTRAVENOUS at 09:02

## 2017-02-23 RX ADMIN — CALCIUM CARBONATE (ANTACID) CHEW TAB 500 MG 1000 MG: 500 CHEW TAB at 07:02

## 2017-02-23 RX ADMIN — ACETAMINOPHEN 650 MG: 325 TABLET ORAL at 01:02

## 2017-02-23 RX ADMIN — OXYCODONE HYDROCHLORIDE 5 MG: 5 TABLET ORAL at 07:02

## 2017-02-23 RX ADMIN — FAMOTIDINE 20 MG: 20 TABLET, FILM COATED ORAL at 08:02

## 2017-02-23 RX ADMIN — OXYCODONE HYDROCHLORIDE 5 MG: 5 TABLET ORAL at 12:02

## 2017-02-23 RX ADMIN — FUROSEMIDE 40 MG: 10 INJECTION, SOLUTION INTRAMUSCULAR; INTRAVENOUS at 05:02

## 2017-02-23 RX ADMIN — ONDANSETRON 4 MG: 2 INJECTION INTRAMUSCULAR; INTRAVENOUS at 12:02

## 2017-02-23 RX ADMIN — OXYCODONE HYDROCHLORIDE 10 MG: 5 TABLET ORAL at 03:02

## 2017-02-23 RX ADMIN — OXYCODONE HYDROCHLORIDE 5 MG: 5 TABLET ORAL at 05:02

## 2017-02-23 RX ADMIN — DOCUSATE SODIUM 100 MG: 100 CAPSULE, LIQUID FILLED ORAL at 08:02

## 2017-02-23 RX ADMIN — FAMOTIDINE 20 MG: 40 POWDER, FOR SUSPENSION ORAL at 09:02

## 2017-02-23 RX ADMIN — ATORVASTATIN CALCIUM 40 MG: 20 TABLET, FILM COATED ORAL at 08:02

## 2017-02-23 NOTE — ASSESSMENT & PLAN NOTE
BG goal 110-140. BG at goal with no insulin needs.   Continue BG monitoring AC/HS   Will sign off today. Thank you for this consult. Please re-consult if needed.     Discharge Planning: anticipating resolution of hyperglycemia. She does have prediabetes, which is new to her. Discussed with patient. Recommend diet and exercise. F/u with pcp for repeat A1c in 3-6 months.   Lab Results   Component Value Date    HGBA1C 5.8 02/22/2017

## 2017-02-23 NOTE — PLAN OF CARE
Problem: Patient Care Overview  Goal: Individualization & Mutuality  Hx: HTN, hypothyroid, anxiety, kidney stones, CAD, smoker, angina    2/15: ER, NSTEMI  2/16: angiogram  2/20: CABG x3  2/21: extubated   Nursing:  MAP 60-80   Plan of care discussed with patient.  Patient ambulating with assistance, fall precautions in place.Continuing to encourage sternal precautions, IS, and ambulation. Patient complains of pain, but pain is relieved with medication. Discussed medications and care. Patient has no questions at this time. Will continue to monitor.

## 2017-02-23 NOTE — SUBJECTIVE & OBJECTIVE
"Interval HPI:   No hypoglycemia.  BG at goal.   Eat well.     Visit Vitals    BP (!) 88/72 (BP Location: Left arm, Patient Position: Sitting, BP Method: Automatic)    Pulse 81    Temp 98.2 °F (36.8 °C) (Oral)    Resp 18    Ht 5' 4" (1.626 m)    Wt 105.2 kg (232 lb)    SpO2 97%    Breastfeeding No    BMI 39.82 kg/m2       Labs Reviewed and Include      Recent Labs  Lab 02/23/17  0550      CALCIUM 9.3      K 4.5   CO2 28      BUN 26*   CREATININE 1.2     Lab Results   Component Value Date    WBC 17.20 (H) 02/23/2017    HGB 9.7 (L) 02/23/2017    HCT 30.3 (L) 02/23/2017    MCV 93 02/23/2017     02/23/2017       Recent Labs  Lab 02/22/17  0030   TSH 5.746*   FREET4 0.77     Lab Results   Component Value Date    HGBA1C 5.8 02/22/2017       Nutritional status:   Body mass index is 39.82 kg/(m^2).  Lab Results   Component Value Date    ALBUMIN 3.7 02/17/2017    ALBUMIN 3.4 (L) 02/16/2017    ALBUMIN 4.1 02/15/2017     No results found for: PREALBUMIN    Estimated Creatinine Clearance: 66.3 mL/min (based on Cr of 1.2).    Accu-Checks  Recent Labs      02/21/17   0813  02/21/17   0935  02/21/17   1023  02/21/17   1133  02/21/17   1709  02/21/17   2232  02/22/17   0300  02/22/17   1324  02/22/17   1756  02/22/17   2353   POCTGLUCOSE  143*  139*  131*  115*  99  80  140*  175*  119*  143*       Current Medications and/or Treatments Impacting Glycemic Control  Immunotherapy:  Immunosuppressants     None        Steroids:   Hormones     None        Pressors:    Autonomic Drugs     None        Hyperglycemia/Diabetes Medications: Antihyperglycemics     Start     Stop Route Frequency Ordered    02/22/17 1119  insulin aspart pen 0-5 Units      -- SubQ Before meals & nightly PRN 02/22/17 1019        "

## 2017-02-23 NOTE — PROGRESS NOTES
"[facility]  Endocrinology  Progress Note    Admit Date: 2/15/2017     Reason for Consult: Management of Hyperglycemia     Surgical Procedure and Date: S/P CABG x3 on 2/20/17    Diabetes diagnosis year: N/A    HPI:   Patient is a 52 y.o. female with a diagnosis of CAD after 3 month c/o chest pain that radiated to her jaws and arms. She also recently began to have dyspnea. (+) FH of CAD. No personal history of DM, but she has diabetes in her family on her father's side of the family. Endocrine consulted for hyperglycemia/insulin.          Interval HPI:   No hypoglycemia.  BG at goal.   Eat well.     Visit Vitals    BP (!) 88/72 (BP Location: Left arm, Patient Position: Sitting, BP Method: Automatic)    Pulse 81    Temp 98.2 °F (36.8 °C) (Oral)    Resp 18    Ht 5' 4" (1.626 m)    Wt 105.2 kg (232 lb)    SpO2 97%    Breastfeeding No    BMI 39.82 kg/m2       Labs Reviewed and Include      Recent Labs  Lab 02/23/17  0550      CALCIUM 9.3      K 4.5   CO2 28      BUN 26*   CREATININE 1.2     Lab Results   Component Value Date    WBC 17.20 (H) 02/23/2017    HGB 9.7 (L) 02/23/2017    HCT 30.3 (L) 02/23/2017    MCV 93 02/23/2017     02/23/2017       Recent Labs  Lab 02/22/17  0030   TSH 5.746*   FREET4 0.77     Lab Results   Component Value Date    HGBA1C 5.8 02/22/2017       Nutritional status:   Body mass index is 39.82 kg/(m^2).  Lab Results   Component Value Date    ALBUMIN 3.7 02/17/2017    ALBUMIN 3.4 (L) 02/16/2017    ALBUMIN 4.1 02/15/2017     No results found for: PREALBUMIN    Estimated Creatinine Clearance: 66.3 mL/min (based on Cr of 1.2).    Accu-Checks  Recent Labs      02/21/17   0813  02/21/17   0935  02/21/17   1023  02/21/17   1133  02/21/17   1709  02/21/17   2232  02/22/17   0300  02/22/17   1324  02/22/17   1756  02/22/17   2353   POCTGLUCOSE  143*  139*  131*  115*  99  80  140*  175*  119*  143*       Current Medications and/or Treatments Impacting " Glycemic Control  Immunotherapy:  Immunosuppressants     None        Steroids:   Hormones     None        Pressors:    Autonomic Drugs     None        Hyperglycemia/Diabetes Medications: Antihyperglycemics     Start     Stop Route Frequency Ordered    02/22/17 1119  insulin aspart pen 0-5 Units      -- SubQ Before meals & nightly PRN 02/22/17 1019          ASSESSMENT and PLAN    Prediabetes  BG goal 110-140. BG at goal with no insulin needs.   Continue BG monitoring AC/HS   Will sign off today. Thank you for this consult. Please re-consult if needed.     Discharge Planning: anticipating resolution of hyperglycemia. She does have prediabetes, which is new to her. Discussed with patient. Recommend diet and exercise. F/u with pcp for repeat A1c in 3-6 months.   Lab Results   Component Value Date    HGBA1C 5.8 02/22/2017     * S/P CABG x 3  Per CTS.     Obesity (BMI 30-39.9)  Body mass index is 39.82 kg/(m^2). Can worsen insulin resistance.     Hypothyroid  Lab Results   Component Value Date    TSH 5.746 (H) 02/22/2017    Was on Levothyroxine 25 mcg at home. Recommend resuming dose.    TSH elevated. She currently takes her synthroid at night with food.   Reviewed correct synthroid administration (in am, on an empty stomach, and wait 30 minutes to eat/drink). Recommend repeating TSH in 8 weeks.     Coronary artery disease involving native coronary artery of native heart without angina pectoris  Avoid hypoglycemia.   S/p CABGx3      LATESHA Sin, FNP  Endocrinology  [facility]

## 2017-02-23 NOTE — PLAN OF CARE
Problem: Patient Care Overview  Goal: Plan of Care Review  Outcome: Ongoing (interventions implemented as appropriate)  Sternal precautions and IS continue to be encouraged. Pt remained free from falls/trauma/injury. VSS. Denies any CP, SOB, palpitations, and dizziness. Pt c/o pain that is relieved by PRN pain medication. Turning/repositioning independently in bed. Plan of care reviewed with patient and all questions answered, verbalizes understanding. No acute distress noted. Will continue to monitor.

## 2017-02-23 NOTE — PT/OT/SLP PROGRESS
Physical Therapy  Treatment/ Discharge    Jolynn Mancia   MRN: [id number]   Admitting Diagnosis: S/P CABG x 3    PT Received On: 17  PT Start Time: 1348     PT Stop Time: 1400    PT Total Time (min): 12 min       Billable Minutes:  Gait [id number]    Treatment Type: Treatment  PT/PTA: PT     PTA Visit Number: 0       General Precautions: Standard, fall, sternal  Orthopedic Precautions: N/A   Braces: N/A    Do you have any cultural, spiritual, Faith conflicts, given your current situation?: None    Subjective:  Communicated with RN prior to session.  Pt agreeable to working with PT.    Pain Ratin/10     Pain Rating Post-Intervention: 0/10    Objective:   Patient found with: telemetry, oxygen    Functional Mobility:  Bed Mobility:    NT pt UIC upon arrival.     Transfers:  Sit <> Stand Assistance: Stand By Assistance  Sit <> Stand Assistive Device: No Assistive Device    Gait:   Gait Distance: x400 feet  Assistance 1: Supervision  Gait Assistive Device: No device  Gait Pattern: 2-point gait  Gait Deviation(s): decreased juan       Balance:   Static Sit: GOOD+: Takes MAXIMAL challenges from all directions.    Dynamic Sit: GOOD+: Maintains balance through MAXIMAL excursions of active trunk motion  Static Stand: GOOD-: Takes MODERATE challenges from all directions inconsistently  Dynamic stand: GOOD: Needs SUPERVISION only during gait and able to self right with moderate      Therapeutic Activities and Exercises:  Pt educated on therex to perform throughout the day to strengthen BLE. Pt educated on continuing to ambulate with RN present for increasing endurance.      AM-PAC 6 CLICK MOBILITY  How much help from another person does this patient currently need?   1 = Unable, Total/Dependent Assistance  2 = A lot, Maximum/Moderate Assistance  3 = A little, Minimum/Contact Guard/Supervision  4 = None, Modified [place]/Independent    Turning over in bed (including adjusting bedclothes, sheets and  blankets)?: 4  Sitting down on and standing up from a chair with arms (e.g., wheelchair, bedside commode, etc.): 4  Moving from lying on back to sitting on the side of the bed?: 4  Moving to and from a bed to a chair (including a wheelchair)?: 4  Need to walk in hospital room?: 3  Climbing 3-5 steps with a railing?: 3  Total Score: 22    AM-PAC Raw Score CMS G-Code Modifier Level of Impairment Assistance   6 % Total / Unable   7 - 9 CM 80 - 100% Maximal Assist   10 - 14 CL 60 - 80% Moderate Assist   15 - 19 CK 40 - 60% Moderate Assist   20 - 22 CJ 20 - 40% Minimal Assist   23 CI 1-20% SBA / CGA   24 CH 0% Independent/ Mod I     Patient left up in chair with all lines intact, call button in reach, RN notified and spouse present.    Assessment:  Jolynn Mancia is a 52 y.o. female with a medical diagnosis of S/P CABG x 3 and presents with  presents with no functional abnormalities/limitations noted. Pt met all PT goals. Patient demonstrated to be a safe ambulator and would require no further PT services in the acute care setting. Pt safe to ambulate on unit with RN. Patient plan to discharge home with no therapy services required.   .    Rehab identified problem list/impairments: Rehab identified problem list/impairments: weakness, impaired endurance, gait instability, impaired functional mobilty      Activity tolerance: Good    Discharge recommendations: Discharge Facility/Level Of Care Needs: home     Barriers to discharge: Barriers to Discharge: None    Equipment recommendations:   None     GOALS:   Physical Therapy Goals     Not on file      Multidisciplinary Problems (Resolved)        Problem: Physical Therapy Goal    Goal Priority Disciplines Outcome Goal Variances Interventions   Physical Therapy Goal   (Resolved)     PT/OT, PT Outcome(s) achieved     Description:  Goals to be met by: 2017     Patient will increase functional independence with mobility by performin. Supine to sit with  supervision  2. Sit to supine with supervision  3. Sit to stand transfer with Supervision  4. Bed to chair transfer with Supervision   5. Gait  x 150 feet with Supervision                 PLAN:    DC from PT services; met all PT goals and pt at [facility]. Plan of Care reviewed with: patient, spouse         Sonny Marcelo, PT  02/23/2017

## 2017-02-23 NOTE — PLAN OF CARE
Problem: Physical Therapy Goal  Goal: Physical Therapy Goal  Goals to be met by: 2017     Patient will increase functional independence with mobility by performin. Supine to sit with supervision  2. Sit to supine with supervision  3. Sit to stand transfer with Supervision  4. Bed to chair transfer with Supervision   5. Gait x 150 feet with Supervision    Outcome: Outcome(s) achieved Date Met:  17  Pt met all goals.

## 2017-02-23 NOTE — PLAN OF CARE
SW spoke with the NP and the pt will not need HH when she is dc'd.    Lizbeth Hogue, [facility] x [phone]

## 2017-02-23 NOTE — PROGRESS NOTES
[facility]  Adult Nutrition  Consult Note    SUMMARY     Recommendations    Recommendation/Intervention:   1. Continue current diet order.   2. Encouraged good PO intake of meals with a high source of protein at each meal. Will monitor.   Goals: Patient will consume > 75% of meals  Nutrition Goal Status: progressing towards goal  Communication of RD Recs: reviewed with RN    Reason for Assessment    Reason for Assessment: RD follow-up  Diagnosis: other (see comments) (s/p CABG)  Relevent Medical History: HTN   Nutrition Discharge Planning: Adequate PO intake for optimal nutrition.     Nutrition Prescription Ordered    Current Diet Order: Cardiac  Nutrition Order Comments: 1500 mL FR    Nutrition Risk Screen     Nutrition Risk Screen: no indicators present    Nutrition/Diet History    Patient Reported Diet/Restrictions/Preferences:  (MARY)  Typical Food/Fluid Intake: Pt reports light PO intake, consumed toast this AM, requested smoothie with extra protein for lunch from family member. Drank smoothie last night as well.    Factors Affecting Nutritional Intake: decreased appetite    Labs/Tests/Procedures/Meds    Pertinent Labs Reviewed: reviewed  Pertinent Medications Reviewed: reviewed  Pertinent Medications Comments: Ca carbonate, docusate, lasix    Physical Findings    Overall Physical Appearance: overweight  Tubes: other (see comments) (chest tube - no output)  Oral/Mouth Cavity: WDL  Skin: edema, other (see comments) (incisions)    Anthropometrics    Height (inches): 64.02 in  Weight Method: Stated  Weight (kg): 105.2 kg  Ideal Body Weight (IBW), Female: 120.1 lb  % Ideal Body Weight, Female (lb): 193.11   BMI (kg/m2): 39.79  BMI Grade: 35 - 39.9 - obesity - grade II  Usual Body Weight (UBW), kg:  (MARY)    Estimated/Assessed Needs    Weight Used For Calorie Calculations: 105.2 kg (231 lb 14.8 oz)   Height (cm): 162.6 cm  Energy Need Method: [place]-St Jeor (2075 kcal/day (1.25))  RMR  ([place]-St. Jeor Equation): 1660.2  Weight Used For Protein Calculations: 105.2 kg (231 lb 14.8 oz)  Protein Requirements: 105-126g (1-1.2g/kg)  Fluid Need Method:  (per MD)    Monitor and Evaluation    Food and Nutrient Intake: energy intake, food and beverage intake  Food and Nutrient Adminstration: diet order  Physical Activity and Function: nutrition-related ADLs and IADLs  Anthropometric Measurements: weight change  Biochemical Data, Medical Tests and Procedures: electrolyte and renal panel, gastrointestinal profile  Nutrition-Focused Physical Findings: overall appearance    Nutrition Risk    Level of Risk: moderate    Nutrition Follow-Up    RD Follow-up?: Yes    Assessment and Plan    Nutrition Diagnosis  Problem: Increased nutrient needs  Etiology: increased need for protein  Signs/Symptoms: s/p CABGx3  Nutrition Diagnosis Status: continues

## 2017-02-23 NOTE — PROGRESS NOTES
Pt ambulated to the restroom with patient care technician. When Pt sat on toilet, chest tube dislodged. Occlusive gauze applied and chest X-ray ordered. MD Nakia notified and stated that it is okay and to DC the chest X-ray. Orders carried out, will continue to monitor.

## 2017-02-23 NOTE — PROGRESS NOTES
Progress Note  Cardiothoracic Surgery    Admit Date: 2/15/2017  Post-operative Day: 3 Days Post-Op  Hospital Day: 9    SUBJECTIVE:  No acute problems overnight.  SR per monitor.     Follow-up For: Procedure(s) (LRB):  AORTOCORONARY BYPASS-CABG x3 (N/A)  HARVEST-VEIN-ENDOVASCULAR (N/A)    Scheduled Meds:   aspirin  325 mg Oral Daily    atorvastatin  40 mg Oral Daily    docusate sodium  100 mg Oral BID    famotidine  20 mg Oral BID    furosemide  40 mg Intravenous TID    levothyroxine  25 mcg Oral Before breakfast    lorazepam  0.5 mg Oral Once    metoprolol tartrate  25 mg Oral BID    pantoprazole  40 mg Oral Daily    polyethylene glycol  17 g Oral Daily     Infusions/Drips:   PRN Meds: acetaminophen, calcium carbonate, dextrose 50%, dextrose 50%, glucagon (human recombinant), glucose, glucose, insulin aspart, magnesium hydroxide 400 mg/5 ml, ondansetron, oxycodone, oxycodone    Review of patient's allergies indicates:   Allergen Reactions    Prednisone        OBJECTIVE:     Vital Signs (Most Recent)  Temp: 98.2 °F (36.8 °C) (02/23/17 0730)  Pulse: 75 (02/23/17 0730)  Resp: 18 (02/23/17 0730)  BP: (!) 88/72 (02/23/17 0730)  SpO2: 97 % (02/23/17 0730)    Admission Weight: 107 kg (236 lb) (02/15/17 0640)   Most Recent Weight: 105.2 kg (232 lb) (02/20/17 1114)    Vital Signs Range (Last 24H):  Temp:  [97.7 °F (36.5 °C)-98.2 °F (36.8 °C)]   Pulse:  [69-93]   Resp:  [16-18]   BP: ()/(58-72)   SpO2:  [92 %-98 %]     I & O (Last 24H):  Intake/Output Summary (Last 24 hours) at 02/23/17 1048  Last data filed at 02/23/17 0600   Gross per 24 hour   Intake              540 ml   Output              200 ml   Net              340 ml     Physical Exam:  General: no distress  Head: normocephalic  Lungs:  normal respiratory effort  Heart: regular rate and rhythm  Abdomen: soft/nontender   Extremities: warm, well perfused  Skin: Skin color, texture, turgor normal. No rashes or lesions  Neurologic: Alert and oriented.  Thought content appropriate    Wound/Incision:  clean, dry, intact    Laboratory:  CBC:   Recent Labs  Lab 02/23/17  0550   WBC 17.20*   RBC 3.25*   HGB 9.7*   HCT 30.3*      MCV 93   MCH 29.8   MCHC 32.0     BMP:   Recent Labs  Lab 02/23/17  0550         K 4.5      CO2 28   BUN 26*   CREATININE 1.2   CALCIUM 9.3   MG 1.9       Diagnostic Results:  Chest xray reviewed    ASSESSMENT/PLAN:     Assessment:   Active Hospital Problems    Diagnosis    *S/P CABG x 3    Coronary artery disease involving native coronary artery of native heart without angina pectoris    Acute renal insufficiency    Prediabetes    Anxiety    HTN (hypertension), benign    Obesity (BMI 30-39.9)    Hypothyroid    Tobacco abuse       Plan:   Sternal precautions  PT/Ot  Ambulate  Monitor renal function  Creatinine has improved; holding lisinopril  Monitor blood pressure  Continue beta blocker  Daily weights  1800 kirsten ADA diet  Continue thyroid replacement  Discharge planning ongoing

## 2017-02-24 VITALS
DIASTOLIC BLOOD PRESSURE: 62 MMHG | TEMPERATURE: 98 F | HEIGHT: 64 IN | WEIGHT: 232 LBS | HEART RATE: 75 BPM | OXYGEN SATURATION: 82 % | BODY MASS INDEX: 39.61 KG/M2 | RESPIRATION RATE: 18 BRPM | SYSTOLIC BLOOD PRESSURE: 97 MMHG

## 2017-02-24 PROBLEM — E66.2 PICKWICKIAN SYNDROME: Status: ACTIVE | Noted: 2017-02-24

## 2017-02-24 PROBLEM — J45.909 ASTHMA: Status: ACTIVE | Noted: 2017-02-24

## 2017-02-24 PROBLEM — I50.1 PULMONARY EDEMA CARDIAC CAUSE: Status: ACTIVE | Noted: 2017-02-24

## 2017-02-24 LAB
ANION GAP SERPL CALC-SCNC: 7 MMOL/L
BASOPHILS # BLD AUTO: 0.04 K/UL
BASOPHILS NFR BLD: 0.3 %
BUN SERPL-MCNC: 24 MG/DL
CALCIUM SERPL-MCNC: 9.3 MG/DL
CHLORIDE SERPL-SCNC: 97 MMOL/L
CO2 SERPL-SCNC: 33 MMOL/L
CREAT SERPL-MCNC: 1 MG/DL
DIFFERENTIAL METHOD: ABNORMAL
EOSINOPHIL # BLD AUTO: 0.3 K/UL
EOSINOPHIL NFR BLD: 2.2 %
ERYTHROCYTE [DISTWIDTH] IN BLOOD BY AUTOMATED COUNT: 13.5 %
EST. GFR  (AFRICAN AMERICAN): >60 ML/MIN/1.73 M^2
EST. GFR  (NON AFRICAN AMERICAN): >60 ML/MIN/1.73 M^2
GLUCOSE SERPL-MCNC: 104 MG/DL
HCT VFR BLD AUTO: 31.3 %
HGB BLD-MCNC: 10 G/DL
LYMPHOCYTES # BLD AUTO: 1.7 K/UL
LYMPHOCYTES NFR BLD: 12.8 %
MAGNESIUM SERPL-MCNC: 1.8 MG/DL
MCH RBC QN AUTO: 29.8 PG
MCHC RBC AUTO-ENTMCNC: 31.9 %
MCV RBC AUTO: 93 FL
MONOCYTES # BLD AUTO: 1.2 K/UL
MONOCYTES NFR BLD: 8.9 %
NEUTROPHILS # BLD AUTO: 9.8 K/UL
NEUTROPHILS NFR BLD: 75.4 %
PHOSPHATE SERPL-MCNC: 2.3 MG/DL
PLATELET # BLD AUTO: 276 K/UL
PMV BLD AUTO: 12.8 FL
POCT GLUCOSE: 132 MG/DL (ref 70–110)
POTASSIUM SERPL-SCNC: 4 MMOL/L
RBC # BLD AUTO: 3.36 M/UL
SODIUM SERPL-SCNC: 137 MMOL/L
WBC # BLD AUTO: 13 K/UL

## 2017-02-24 PROCEDURE — 85025 COMPLETE CBC W/AUTO DIFF WBC: CPT

## 2017-02-24 PROCEDURE — 25000003 PHARM REV CODE 250: Performed by: NURSE PRACTITIONER

## 2017-02-24 PROCEDURE — 25000003 PHARM REV CODE 250: Performed by: EMERGENCY MEDICINE

## 2017-02-24 PROCEDURE — 80048 BASIC METABOLIC PNL TOTAL CA: CPT

## 2017-02-24 PROCEDURE — 63600175 PHARM REV CODE 636 W HCPCS: Performed by: NURSE PRACTITIONER

## 2017-02-24 PROCEDURE — 25000003 PHARM REV CODE 250: Performed by: STUDENT IN AN ORGANIZED HEALTH CARE EDUCATION/TRAINING PROGRAM

## 2017-02-24 PROCEDURE — 94761 N-INVAS EAR/PLS OXIMETRY MLT: CPT

## 2017-02-24 PROCEDURE — 27000221 HC OXYGEN, UP TO 24 HOURS

## 2017-02-24 PROCEDURE — 94664 DEMO&/EVAL PT USE INHALER: CPT

## 2017-02-24 PROCEDURE — 83735 ASSAY OF MAGNESIUM: CPT

## 2017-02-24 PROCEDURE — 84100 ASSAY OF PHOSPHORUS: CPT

## 2017-02-24 RX ORDER — FUROSEMIDE 40 MG/1
20 TABLET ORAL 2 TIMES DAILY
Qty: 30 TABLET | Refills: 11 | Status: SHIPPED | OUTPATIENT
Start: 2017-02-24 | End: 2017-02-24 | Stop reason: SDUPTHER

## 2017-02-24 RX ORDER — ONDANSETRON 4 MG/1
4 TABLET, FILM COATED ORAL ONCE
Status: COMPLETED | OUTPATIENT
Start: 2017-02-24 | End: 2017-02-24

## 2017-02-24 RX ORDER — POTASSIUM CHLORIDE 20 MEQ/1
20 TABLET, EXTENDED RELEASE ORAL DAILY
Qty: 14 TABLET | Refills: 0 | Status: SHIPPED | OUTPATIENT
Start: 2017-02-24 | End: 2017-02-24 | Stop reason: SDUPTHER

## 2017-02-24 RX ORDER — FUROSEMIDE 40 MG/1
TABLET ORAL
Qty: 90 TABLET | Refills: 11 | Status: SHIPPED | OUTPATIENT
Start: 2017-02-24 | End: 2017-03-21

## 2017-02-24 RX ORDER — OXYCODONE HYDROCHLORIDE 5 MG/1
5 TABLET ORAL EVERY 4 HOURS PRN
Qty: 61 TABLET | Refills: 0 | Status: SHIPPED | OUTPATIENT
Start: 2017-02-24 | End: 2017-03-21

## 2017-02-24 RX ORDER — METOPROLOL TARTRATE 25 MG/1
25 TABLET, FILM COATED ORAL 2 TIMES DAILY
Qty: 60 TABLET | Refills: 11 | Status: SHIPPED | OUTPATIENT
Start: 2017-02-24 | End: 2018-12-06

## 2017-02-24 RX ORDER — POTASSIUM CHLORIDE 20 MEQ/1
TABLET, EXTENDED RELEASE ORAL
Qty: 90 TABLET | Refills: 0 | Status: SHIPPED | OUTPATIENT
Start: 2017-02-24 | End: 2017-03-21

## 2017-02-24 RX ORDER — OXYCODONE HYDROCHLORIDE 5 MG/1
5 TABLET ORAL EVERY 4 HOURS PRN
Qty: 61 TABLET | Refills: 0 | Status: SHIPPED | OUTPATIENT
Start: 2017-02-24 | End: 2017-02-24

## 2017-02-24 RX ORDER — ATORVASTATIN CALCIUM 40 MG/1
40 TABLET, FILM COATED ORAL DAILY
Qty: 30 TABLET | Refills: 11 | Status: SHIPPED | OUTPATIENT
Start: 2017-02-24 | End: 2017-05-09

## 2017-02-24 RX ORDER — ASPIRIN 325 MG
325 TABLET, DELAYED RELEASE (ENTERIC COATED) ORAL DAILY
Refills: 0 | COMMUNITY
Start: 2017-02-24 | End: 2019-03-09

## 2017-02-24 RX ADMIN — OXYCODONE HYDROCHLORIDE 10 MG: 5 TABLET ORAL at 02:02

## 2017-02-24 RX ADMIN — FAMOTIDINE 20 MG: 40 POWDER, FOR SUSPENSION ORAL at 08:02

## 2017-02-24 RX ADMIN — PANTOPRAZOLE SODIUM 40 MG: 40 TABLET, DELAYED RELEASE ORAL at 08:02

## 2017-02-24 RX ADMIN — FUROSEMIDE 40 MG: 10 INJECTION, SOLUTION INTRAMUSCULAR; INTRAVENOUS at 06:02

## 2017-02-24 RX ADMIN — OXYCODONE HYDROCHLORIDE 5 MG: 5 TABLET ORAL at 03:02

## 2017-02-24 RX ADMIN — LEVOTHYROXINE SODIUM 25 MCG: 25 TABLET ORAL at 06:02

## 2017-02-24 RX ADMIN — ASPIRIN 325 MG: 325 TABLET, DELAYED RELEASE ORAL at 08:02

## 2017-02-24 RX ADMIN — OXYCODONE HYDROCHLORIDE 10 MG: 5 TABLET ORAL at 10:02

## 2017-02-24 RX ADMIN — ATORVASTATIN CALCIUM 40 MG: 20 TABLET, FILM COATED ORAL at 08:02

## 2017-02-24 RX ADMIN — ONDANSETRON 4 MG: 4 TABLET, FILM COATED ORAL at 02:02

## 2017-02-24 RX ADMIN — ONDANSETRON 4 MG: 2 INJECTION INTRAMUSCULAR; INTRAVENOUS at 12:02

## 2017-02-24 RX ADMIN — METOPROLOL TARTRATE 25 MG: 25 TABLET ORAL at 08:02

## 2017-02-24 NOTE — DISCHARGE SUMMARY
[facility]  Discharge Summary  General Surgery      Admit Date: 2/15/2017    Discharge Date and Time:  02/24/2017 1:51 PM    Attending Physician: Manuel Vieyra MD     Discharge Provider: Garett Smith    Reason for Admission: CAD    Procedures Performed: Procedure(s) (LRB):  AORTOCORONARY BYPASS-CABG x3 (N/A)  HARVEST-VEIN-ENDOVASCULAR (N/A)    Hospital Course (synopsis of major diagnoses, care, treatment, and services provided during the course of the hospital stay): Jolynn Mancia was admitted to the hospital with an n-stemi as a transfer from an OSH. It was deemed she was a good candidate for revascularization with CABG. The pt underwent 3 vessel CABG and did well. She had an uneventful postoperative period. By the date of discharge 02/24/2017, she was HDS off pressors, eating well, ambulating without assistance, and doing well. She was requiring oxygen at rest but not on ambulation, and qualified for home o2. She was discharged home with plans for follow up 2-3 weeks.     Consults: none    Significant Diagnostic Studies: Labs:   BMP:   Recent Labs  Lab 02/23/17  0550 02/24/17  0344    104    137   K 4.5 4.0    97   CO2 28 33*   BUN 26* 24*   CREATININE 1.2 1.0   CALCIUM 9.3 9.3   MG 1.9 1.8    and CBC   Recent Labs  Lab 02/23/17  0550 02/24/17  0344   WBC 17.20* 13.00*   HGB 9.7* 10.0*   HCT 30.3* 31.3*    276       Final Diagnoses:   Principal Problem: S/P CABG x 3   Secondary Diagnoses:   Active Hospital Problems    Diagnosis  POA    *S/P CABG x 3 [Z95.1]  Not Applicable    Hyperglycemia [R73.9]  Unknown    Hypothyroidism due to acquired atrophy of thyroid [E03.4]  Unknown    Coronary artery disease involving native coronary artery of native heart without angina pectoris [I25.10]  Yes    Acute renal insufficiency [N28.9]  No    Prediabetes [R73.03]  No    Anxiety [F41.9]  Yes    HTN (hypertension), benign [I10]  Yes    Obesity (BMI 30-39.9) [E66.9]   Yes    Hypothyroid [E03.9]  Yes    Tobacco abuse [Z72.0]  Yes      Resolved Hospital Problems    Diagnosis Date Resolved POA    Pre-operative cardiovascular examination [Z01.810] 02/22/2017 Not Applicable    NSTEMI (non-ST elevated myocardial infarction) [I21.4] 02/22/2017 Yes    Chest pain [R07.9] 02/22/2017 Yes       Discharged Condition: stable    Disposition: Home or Self Care    Follow Up/Patient Instructions:     Medications:  Reconciled Home Medications:   Current Discharge Medication List      START taking these medications    Details   aspirin (ECOTRIN) 325 MG EC tablet Take 1 tablet (325 mg total) by mouth once daily.  Refills: 0      atorvastatin (LIPITOR) 40 MG tablet Take 1 tablet (40 mg total) by mouth once daily.  Qty: 30 tablet, Refills: 11      furosemide (LASIX) 40 MG tablet TAKE 1 TWICE DAILY FOR 7 DAYS, 1 TABLET DAILY FOR 7 DAYS, THEN 1/2 TABLET TWICE DAILY  Qty: 90 tablet, Refills: 11    Comments: **Patient requests 90 days supply**      metoprolol tartrate (LOPRESSOR) 25 MG tablet Take 1 tablet (25 mg total) by mouth 2 (two) times daily.  Qty: 60 tablet, Refills: 11      oxycodone (ROXICODONE) 5 MG immediate release tablet Take 1 tablet (5 mg total) by mouth every 4 (four) hours as needed.  Qty: 61 tablet, Refills: 0      potassium chloride SA (K-DUR,KLOR-CON) 20 MEQ tablet TAKE 1 TABLET(20 MEQ) BY MOUTH EVERY DAY  Qty: 90 tablet, Refills: 0    Comments: **Patient requests 90 days supply**         CONTINUE these medications which have NOT CHANGED    Details   levothyroxine (SYNTHROID) 25 MCG tablet Take 25 mcg by mouth once daily.      lisinopril (PRINIVIL,ZESTRIL) 20 MG tablet Take 20 mg by mouth once daily.      albuterol 90 mcg/actuation HFAA Inhale 1-2 puffs into the lungs every 6 (six) hours as needed.  Qty: 1 Inhaler, Refills: 0         STOP taking these medications       nebivolol (BYSTOLIC) 5 MG Tab Comments:   Reason for Stopping:         ibuprofen (ADVIL,MOTRIN) 600 MG tablet  Comments:   Reason for Stopping:               Discharge Procedure Orders  Diet general     Other restrictions (specify):   Order Comments: Sternal precautions     Shower on day dressing removed (No bath)     Call MD for:  temperature >100.4     Call MD for:  severe uncontrolled pain     Call MD for:  redness, tenderness, or signs of infection (pain, swelling, redness, odor or green/yellow discharge around incision site)     Call MD for:  difficulty breathing or increased cough     Call MD for:  persistent dizziness, light-headedness, or visual disturbances     Call MD for:  increased confusion or weakness     No dressing needed     Change dressing (specify)   Order Comments: Keep area clean and dry after showers       Follow-up Information     Follow up with Teresa Brooks MD.    Specialty:  Cardiology    Contact information:    [address]  [facility] [facility] [facility] [address] [phone]          Follow up with Manuel Vieyra MD In 3 weeks.    Specialties:  Cardiothoracic Surgery, Transplant    Why:  For wound re-check, Follow up    Contact information:    [address]  [facility] [address] [phone]

## 2017-02-24 NOTE — PROGRESS NOTES
02/24/17 1200   Vital Signs   SpO2 (!) 82 %   O2 Device (Oxygen Therapy) room air   Patient Position Sitting   patient O2 while sitting at rest on RA. Notified Dr. Graham

## 2017-02-24 NOTE — ANESTHESIA POSTPROCEDURE EVALUATION
"Anesthesia Post Evaluation    Patient: Jolynn Mancia    Procedure(s) Performed: Procedure(s) (LRB):  AORTOCORONARY BYPASS-CABG x3 (N/A)  HARVEST-VEIN-ENDOVASCULAR (N/A)    Final Anesthesia Type: general  Patient location during evaluation: floor  Patient participation: Yes- Able to Participate  Level of consciousness: awake and alert  Post-procedure vital signs: reviewed and stable  Pain management: adequate  Airway patency: patent  PONV status at discharge: No PONV  Anesthetic complications: no      Cardiovascular status: hemodynamically stable  Respiratory status: unassisted and spontaneous ventilation  Hydration status: euvolemic  Follow-up not needed.        Visit Vitals    /66 (BP Location: Left arm, Patient Position: Sitting, BP Method: Automatic)    Pulse 77    Temp 36.7 °C (98 °F) (Oral)    Resp 18    Ht 5' 4" (1.626 m)    Wt 105.2 kg (232 lb)    SpO2 98%    Breastfeeding No    BMI 39.82 kg/m2       Pain/Taylor Score: Pain Assessment Performed: Yes (2/24/2017  4:00 AM)  Presence of Pain: denies (2/24/2017  4:00 AM)  Pain Rating Prior to Med Admin: 5 (2/24/2017  3:37 AM)  Pain Rating Post Med Admin: 0 (2/23/2017  1:59 PM)      "

## 2017-02-24 NOTE — PLAN OF CARE
Problem: Patient Care Overview  Goal: Individualization & Mutuality  Hx: HTN, hypothyroid, anxiety, kidney stones, CAD, smoker, angina    2/15: ER, NSTEMI  2/16: angiogram  2/20: CABG x3  2/21: extubated   Nursing:  MAP 60-80   Plan of care discussed with patient.  Patient ambulating with assistance, fall precautions in place.Continuing to encourage sternal precautions, IS, and ambulation. Patient complains of pain from incision, but pain is relieved with medication. Discussed medications and care. Patient has no questions at this time. Will continue to monitor.

## 2017-02-24 NOTE — PLAN OF CARE
FARHAN spoke to Dr Marinelli.  Pt's sats are 82% on room air; therefore, pt should qualify for home O2.  MD to order home O2 for pt.  FARHAN spoke to pt's RN, Suze, and informed her that pt should not d/c until she receives her home O2.    Elle Frazier LCSW     [phone]  Critical Care (MICU)

## 2017-02-24 NOTE — PROGRESS NOTES
Patient is ready for discharge. Patient stable alert and oriented. IVs removed. No complaints of pain. Discussed discharge plan. Reviewed medications and side effects, appointments, and answered questions with patient and family. Pain RX given to patient. Awaiting home oxygen delivery.

## 2017-02-24 NOTE — PLAN OF CARE
Discharge home today; no needs. Discharge and follow-up instructions to be completed by bedside nurse. SW and CM will continue to follow for any additional needs.      02/24/17 1055   Final Note   Assessment Type Final Discharge Note   Discharge Disposition Home   Discharge planning education complete? Yes   What phone number can be called within the next 1-3 days to see how you are doing after discharge? ([phone])   Hospital Follow Up  Appt(s) scheduled? Yes   Discharge plans and expectations educations in teach back method with documentation complete? Yes   Offered [facility] -- Bedside Delivery? Yes   Discharge/Hospital Encounter Summary to ([facility]) PCP n/a   Referral to Outpatient Case Management complete? n/a   Referral to / orders for Home Health Complete? n/a   30 day supply of medicines given at discharge, if documented non-compliance / non-adherence? n/a   Any social issues identified prior to discharge? No   Did you assess the readiness or willingness of the family or caregiver to support self management of care? Yes

## 2017-02-24 NOTE — PROGRESS NOTES
Progress Note  Cardiothoracic Surgery    Admit Date: 2/15/2017  Post-operative Day: 4 Days Post-Op  Hospital Day: 10    SUBJECTIVE:  No acute problems overnight.  SR per monitor. Patient feeling well. Looking forward to possible discharge.      Follow-up For: Procedure(s) (LRB):  AORTOCORONARY BYPASS-CABG x3 (N/A)  HARVEST-VEIN-ENDOVASCULAR (N/A)    Scheduled Meds:   aspirin  325 mg Oral Daily    atorvastatin  40 mg Oral Daily    docusate sodium  100 mg Oral BID    famotidine  20 mg Oral BID    furosemide  40 mg Intravenous TID    levothyroxine  25 mcg Oral Before breakfast    lorazepam  0.5 mg Oral Once    metoprolol tartrate  25 mg Oral BID    pantoprazole  40 mg Oral Daily    polyethylene glycol  17 g Oral Daily     Infusions/Drips:   PRN Meds: acetaminophen, calcium carbonate, dextrose 50%, dextrose 50%, glucagon (human recombinant), glucose, glucose, insulin aspart, magnesium hydroxide 400 mg/5 ml, ondansetron, oxycodone, oxycodone    Review of patient's allergies indicates:   Allergen Reactions    Prednisone        OBJECTIVE:     Vital Signs (Most Recent)  Temp: 98 °F (36.7 °C) (02/24/17 0730)  Pulse: 77 (02/24/17 0900)  Resp: 18 (02/24/17 0730)  BP: 106/66 (02/24/17 0730)  SpO2: 98 % (02/24/17 0730)    Admission Weight: 107 kg (236 lb) (02/15/17 0640)   Most Recent Weight: 105.2 kg (232 lb) (02/20/17 1114)    Vital Signs Range (Last 24H):  Temp:  [98 °F (36.7 °C)-98.5 °F (36.9 °C)]   Pulse:  [73-89]   Resp:  [16-18]   BP: (100-108)/(59-66)   SpO2:  [90 %-98 %]     I & O (Last 24H):    Intake/Output Summary (Last 24 hours) at 02/24/17 0937  Last data filed at 02/24/17 0506   Gross per 24 hour   Intake             2070 ml   Output             2200 ml   Net             -130 ml     Physical Exam:  General: no distress  Head: normocephalic  Lungs:  normal respiratory effort  Heart: regular rate and rhythm  Abdomen: soft/nontender   Extremities: warm, well perfused  Skin: Skin color, texture, turgor  normal. No rashes or lesions  Neurologic: Alert and oriented. Thought content appropriate    Wound/Incision:  clean, dry, intact    Laboratory:  CBC:     Recent Labs  Lab 02/24/17  0344   WBC 13.00*   RBC 3.36*   HGB 10.0*   HCT 31.3*      MCV 93   MCH 29.8   MCHC 31.9*     BMP:     Recent Labs  Lab 02/24/17  0344         K 4.0   CL 97   CO2 33*   BUN 24*   CREATININE 1.0   CALCIUM 9.3   MG 1.8       Diagnostic Results:  Chest xray reviewed    ASSESSMENT/PLAN:     Assessment:   Active Hospital Problems    Diagnosis    *S/P CABG x 3    Hyperglycemia    Hypothyroidism due to acquired atrophy of thyroid    Coronary artery disease involving native coronary artery of native heart without angina pectoris    Acute renal insufficiency    Prediabetes    Anxiety    HTN (hypertension), benign    Obesity (BMI 30-39.9)    Hypothyroid    Tobacco abuse       Plan:     DC central line  DC home  Patient with pickwickain syndrome, contributing to desaturation at rest given body habitus, pt continues with mild pulmonary edema postoperatively as well being treated with lasix. Patient will require home O2 while being diuresed and while waiting for follow up to treat the aforementioned diagnoses.       Garett Marinelli MD PGY II  [phone]

## 2017-02-28 RX ORDER — POTASSIUM CHLORIDE 20 MEQ/1
TABLET, EXTENDED RELEASE ORAL
Qty: 90 TABLET | Refills: 0 | Status: SHIPPED | OUTPATIENT
Start: 2017-02-28 | End: 2017-03-21

## 2017-03-01 DIAGNOSIS — Z95.1 S/P CABG (CORONARY ARTERY BYPASS GRAFT): Primary | ICD-10-CM

## 2017-03-21 ENCOUNTER — HOSPITAL ENCOUNTER (OUTPATIENT)
Dept: CARDIOLOGY | Facility: CLINIC | Age: 52
Discharge: HOME | End: 2017-03-21
Payer: COMMERCIAL

## 2017-03-21 ENCOUNTER — OFFICE VISIT (OUTPATIENT)
Dept: CARDIOTHORACIC SURGERY | Facility: CLINIC | Age: 52
End: 2017-03-21
Payer: COMMERCIAL

## 2017-03-21 ENCOUNTER — HOSPITAL ENCOUNTER (OUTPATIENT)
Dept: RADIOLOGY | Facility: HOSPITAL | Age: 52
Discharge: HOME | End: 2017-03-21
Attending: THORACIC SURGERY (CARDIOTHORACIC VASCULAR SURGERY)
Payer: COMMERCIAL

## 2017-03-21 ENCOUNTER — DOCUMENTATION ONLY (OUTPATIENT)
Dept: CARDIOTHORACIC SURGERY | Facility: CLINIC | Age: 52
End: 2017-03-21

## 2017-03-21 VITALS
WEIGHT: 230.31 LBS | HEIGHT: 64 IN | HEART RATE: 85 BPM | SYSTOLIC BLOOD PRESSURE: 123 MMHG | BODY MASS INDEX: 39.32 KG/M2 | TEMPERATURE: 98 F | DIASTOLIC BLOOD PRESSURE: 71 MMHG

## 2017-03-21 DIAGNOSIS — Z95.1 S/P CABG (CORONARY ARTERY BYPASS GRAFT): ICD-10-CM

## 2017-03-21 DIAGNOSIS — Z95.1 S/P CABG (CORONARY ARTERY BYPASS GRAFT): Primary | ICD-10-CM

## 2017-03-21 DIAGNOSIS — Z95.1 S/P CABG X 3: Primary | ICD-10-CM

## 2017-03-21 PROCEDURE — 99999 PR PBB SHADOW E&M-EST. PATIENT-LVL III: CPT | Mod: PBBFAC,,, | Performed by: THORACIC SURGERY (CARDIOTHORACIC VASCULAR SURGERY)

## 2017-03-21 PROCEDURE — 71020 XR CHEST PA AND LATERAL: CPT | Mod: TC

## 2017-03-21 PROCEDURE — 99024 POSTOP FOLLOW-UP VISIT: CPT | Mod: S$GLB,,, | Performed by: THORACIC SURGERY (CARDIOTHORACIC VASCULAR SURGERY)

## 2017-03-21 PROCEDURE — 71020 XR CHEST PA AND LATERAL: CPT | Mod: 26,,, | Performed by: RADIOLOGY

## 2017-03-21 PROCEDURE — 93000 ELECTROCARDIOGRAM COMPLETE: CPT | Mod: S$GLB,,, | Performed by: INTERNAL MEDICINE

## 2017-03-21 NOTE — PROGRESS NOTES
Patient seen and examined.  She is progressively increasing activity.  No significant complaints.    Sternum: stable  Chest xray: acceptable post op chest  EKG: NSR    Assessment;  S/P CABG    Plan:  Continue 325 mg ASA for total of 3 months from the date of surgery  DC lasix  DC potassium  Can begin cardiac rehab in the next couple of weeks  Can resume driving  Has already been to see her cardiologist    No scheduled appt.  RTC prn    CTS Attending Note:    I have personally taken the history and examined this patient and agree with the NP's note as stated above.

## 2017-03-21 NOTE — LETTER
[facility]  [address]  [facility] [address]  Phone: [phone] March 21, 2017        Grady Richardson MD  [address] [place] [address]  Heart [facility] [address]    Patient: Jolynn Mancia   MR Number: [id number]   YOB: 1965   Date of Visit: 3/21/2017     Dear Dr. Richardson:    I had the pleasure of seeing your patient  Ms. Jolynn Mancia in clinic today.  As you know, she is a very pleasant 52-year-old woman who initially presented to the Emergency Department complaining of substernal chest pain.  She did rule in for a myocardial infarction.  Prompt angiography demonstrated multivessel disease.  On February 20th, she underwent urgent three-vessel coronary artery bypass grafting.  Her postoperative course was unremarkable, she was ultimately discharged to home.    Today, she returned for routine followup, and in clinic today, Ms. Mancia looked fabulous.  She was without complaint.  On physical examination, her sternum appeared to be healing nicely.  Her chest x-ray was clear.  Her EKG demonstrated a normal sinus rhythm.  Overall, I am very pleased with how well Ms. Mancia has done.  As a result, I will not schedule her to follow up with me.  Certainly, should you or she have any questions or concerns, please do not hesitate to give me a call.    Thank you again for sending this pleasant woman to me.  Of note, I have included a copy of her operative report for your records.    Sincerely,      Manuel Vieyra MD   Chief, Division of Thoracic & Cardiovascular Surgery  [facility]     PEP/and    CC  Markie Kelly MD

## 2017-03-21 NOTE — MR AVS SNAPSHOT
[facility]  [address]  [facility] [address]  Phone: [phone]                  Jolynn Mancia   3/21/2017 10:45 AM   Office Visit    Description:  Female : 1965   Provider:  Manuel Vieyra MD   Department:  [facility]           Diagnoses this Visit        Comments    S/P CABG x 3    -  Primary            To Do List           Goals (5 Years of Data)     None      [facility]     [facility] On Call Nurse Care Line -  Assistance  Registered nurses in the [facility] On Call Center provide clinical advisement, health education, appointment booking, and other advisory services.  Call for this free service at [phone].             Medications           Message regarding Medications     Verify the changes and/or additions to your medication regime listed below are the same as discussed with your clinician today.  If any of these changes or additions are incorrect, please notify your healthcare provider.        STOP taking these medications     albuterol 90 mcg/actuation HFAA Inhale 1-2 puffs into the lungs every 6 (six) hours as needed.    furosemide (LASIX) 40 MG tablet TAKE 1 TWICE DAILY FOR 7 DAYS, 1 TABLET DAILY FOR 7 DAYS, THEN 1/2 TABLET TWICE DAILY    oxycodone (ROXICODONE) 5 MG immediate release tablet Take 1 tablet (5 mg total) by mouth every 4 (four) hours as needed.    potassium chloride SA (K-DUR,KLOR-CON) 20 MEQ tablet TAKE 1 TABLET(20 MEQ) BY MOUTH EVERY DAY    potassium chloride SA (K-DUR,KLOR-CON) 20 MEQ tablet TAKE 1 TABLET(20 MEQ) BY MOUTH EVERY DAY           Verify that the below list of medications is an accurate representation of the medications you are currently taking.  If none reported, the list may be blank. If incorrect, please contact your healthcare provider. Carry this list with you in case of emergency.           Current Medications     aspirin (ECOTRIN) 325 MG EC tablet Take 1 tablet (325 mg total) by mouth once  "daily.    atorvastatin (LIPITOR) 40 MG tablet Take 1 tablet (40 mg total) by mouth once daily.    levothyroxine (SYNTHROID) 25 MCG tablet Take 25 mcg by mouth once daily.    lisinopril (PRINIVIL,ZESTRIL) 20 MG tablet Take 20 mg by mouth once daily.    metoprolol tartrate (LOPRESSOR) 25 MG tablet Take 1 tablet (25 mg total) by mouth 2 (two) times daily.           Clinical Reference Information           Your Vitals Were     BP Pulse Temp Height Weight BMI    123/71 85 97.9 °F (36.6 °C) 5' 4" (1.626 m) 104.5 kg (230 lb 4.8 oz) 39.53 kg/m2      Blood Pressure          Most Recent Value    BP  123/71      Allergies as of 3/21/2017     Prednisone      Immunizations Administered on Date of Encounter - 3/21/2017     None      Smoking Cessation     If you would like to quit smoking:   You may be eligible for free services if you are a [place] resident and started smoking cigarettes before September 1, 1988.  Call the Smoking Cessation Trust ([facility]) toll free at (943) 634-2222 or (563) 832-4474.   Call 0-322-QUIT-NOW if you do not meet the above criteria.            Language Assistance Services     ATTENTION: Language assistance services are available, free of charge. Please call [phone].      ATENCIÓN: Si habla jamariañol, tiene a jack disposición servicios gratuitos de asistencia lingüística. Llame al [phone].     [facility] Ý: N?u b?n nói Ti?ng Vi?t, có các d?ch v? h? tr? ngôn ng? mi?n phí dành cho b?n. G?i s? [phone].         [facility] complies with applicable Federal civil rights laws and does not discriminate on the basis of race, color, national origin, age, disability, or sex.        "

## 2017-04-02 ENCOUNTER — PATIENT MESSAGE (OUTPATIENT)
Dept: CARDIOTHORACIC SURGERY | Facility: CLINIC | Age: 52
End: 2017-04-02

## 2017-04-25 ENCOUNTER — HOSPITAL ENCOUNTER (EMERGENCY)
Facility: HOSPITAL | Age: 52
Discharge: HOME | End: 2017-04-26
Attending: EMERGENCY MEDICINE
Payer: COMMERCIAL

## 2017-04-25 VITALS
RESPIRATION RATE: 18 BRPM | HEIGHT: 64 IN | WEIGHT: 231 LBS | HEART RATE: 64 BPM | TEMPERATURE: 98 F | SYSTOLIC BLOOD PRESSURE: 127 MMHG | BODY MASS INDEX: 39.44 KG/M2 | DIASTOLIC BLOOD PRESSURE: 60 MMHG | OXYGEN SATURATION: 99 %

## 2017-04-25 DIAGNOSIS — R06.00 DYSPNEA, UNSPECIFIED TYPE: Primary | ICD-10-CM

## 2017-04-25 LAB
ALBUMIN SERPL BCP-MCNC: 4 G/DL
ALP SERPL-CCNC: 115 U/L
ALT SERPL W/O P-5'-P-CCNC: 8 U/L
ANION GAP SERPL CALC-SCNC: 10 MMOL/L
AST SERPL-CCNC: 16 U/L
BASOPHILS # BLD AUTO: 0.06 K/UL
BASOPHILS NFR BLD: 0.7 %
BILIRUB SERPL-MCNC: 0.3 MG/DL
BNP SERPL-MCNC: 25 PG/ML
BUN SERPL-MCNC: 20 MG/DL
CALCIUM SERPL-MCNC: 10 MG/DL
CHLORIDE SERPL-SCNC: 107 MMOL/L
CO2 SERPL-SCNC: 28 MMOL/L
CREAT SERPL-MCNC: 1 MG/DL
DIFFERENTIAL METHOD: ABNORMAL
EOSINOPHIL # BLD AUTO: 0.4 K/UL
EOSINOPHIL NFR BLD: 4.3 %
ERYTHROCYTE [DISTWIDTH] IN BLOOD BY AUTOMATED COUNT: 13.4 %
EST. GFR  (AFRICAN AMERICAN): >60 ML/MIN/1.73 M^2
EST. GFR  (NON AFRICAN AMERICAN): >60 ML/MIN/1.73 M^2
GLUCOSE SERPL-MCNC: 104 MG/DL
HCT VFR BLD AUTO: 38.1 %
HGB BLD-MCNC: 11.9 G/DL
INR PPP: 1
LYMPHOCYTES # BLD AUTO: 2.1 K/UL
LYMPHOCYTES NFR BLD: 23.1 %
MCH RBC QN AUTO: 28.7 PG
MCHC RBC AUTO-ENTMCNC: 31.2 %
MCV RBC AUTO: 92 FL
MONOCYTES # BLD AUTO: 0.7 K/UL
MONOCYTES NFR BLD: 7.7 %
NEUTROPHILS # BLD AUTO: 5.7 K/UL
NEUTROPHILS NFR BLD: 64 %
PLATELET # BLD AUTO: 278 K/UL
PMV BLD AUTO: 11.9 FL
POTASSIUM SERPL-SCNC: 3.9 MMOL/L
PROT SERPL-MCNC: 7.7 G/DL
PROTHROMBIN TIME: 10.2 SEC
RBC # BLD AUTO: 4.14 M/UL
SODIUM SERPL-SCNC: 145 MMOL/L
TROPONIN I SERPL DL<=0.01 NG/ML-MCNC: <0.006 NG/ML
WBC # BLD AUTO: 8.88 K/UL

## 2017-04-25 PROCEDURE — 84484 ASSAY OF TROPONIN QUANT: CPT

## 2017-04-25 PROCEDURE — 85610 PROTHROMBIN TIME: CPT

## 2017-04-25 PROCEDURE — 85379 FIBRIN DEGRADATION QUANT: CPT

## 2017-04-25 PROCEDURE — 83880 ASSAY OF NATRIURETIC PEPTIDE: CPT

## 2017-04-25 PROCEDURE — 99284 EMERGENCY DEPT VISIT MOD MDM: CPT

## 2017-04-25 PROCEDURE — 85025 COMPLETE CBC W/AUTO DIFF WBC: CPT

## 2017-04-25 PROCEDURE — 80053 COMPREHEN METABOLIC PANEL: CPT

## 2017-04-25 RX ORDER — ASPIRIN 325 MG
325 TABLET ORAL
Status: DISCONTINUED | OUTPATIENT
Start: 2017-04-25 | End: 2017-04-25

## 2017-04-25 NOTE — ED AVS SNAPSHOT
[facility]  [address] [place] [address]               Jolynn Mancia   2017 11:21 PM   ED    Description:  Female : 1965   Department:  [facility]           Your Care was Coordinated By:     Provider Role From To    Moraima Walsh MD Attending Provider [phone]    Moraima Walsh MD Attending Provider 17 [phone] --      Reason for Visit     Shortness of Breath           Diagnoses this Visit        Comments    Dyspnea, unspecified type    -  Primary       ED Disposition     ED Disposition Condition Comment    Discharge             To Do List           Follow-up Information     Follow up with Grady Richardson MD.    Specialty:  Cardiology    Why:  as scheduled     Contact information:    [address] [facility] [address]  [facility]  [place] [address]  [phone]        [facility] On Call     [facility] Nurse Care Line - 24/ Assistance  Unless otherwise directed by your provider, please contact [facility], our nurse care line that is available for  assistance.     Registered nurses in the [facility] Center provide: appointment scheduling, clinical advisement, health education, and other advisory services.  Call: [phone] (toll free)               Medications           Message regarding Medications     Verify the changes and/or additions to your medication regime listed below are the same as discussed with your clinician today.  If any of these changes or additions are incorrect, please notify your healthcare provider.             Verify that the below list of medications is an accurate representation of the medications you are currently taking.  If none reported, the list may be blank. If incorrect, please contact your healthcare provider. Carry this list with you in case of emergency.           Current Medications     aspirin (ECOTRIN) 325 MG EC tablet Take 1 tablet (325 mg total) by mouth  "once daily.    atorvastatin (LIPITOR) 40 MG tablet Take 1 tablet (40 mg total) by mouth once daily.    levothyroxine (SYNTHROID) 25 MCG tablet Take 25 mcg by mouth once daily.    lisinopril (PRINIVIL,ZESTRIL) 20 MG tablet Take 20 mg by mouth once daily.    metoprolol tartrate (LOPRESSOR) 25 MG tablet Take 1 tablet (25 mg total) by mouth 2 (two) times daily.           Clinical Reference Information           Your Vitals Were     BP Pulse Temp Resp Height Weight    127/60 64 97.9 °F (36.6 °C) (Oral) 18 5' 4" (1.626 m) 104.8 kg (231 lb)    SpO2 BMI             99% 39.65 kg/m2         Allergies as of 4/26/2017        Reactions    Prednisone       Immunizations Administered on Date of Encounter - 4/26/2017     None      ED Micro, Lab, POCT     Start Ordered       Status Ordering Provider    04/25/17 2358 04/25/17 2357  D dimer, quantitative  STAT      Final result     04/25/17 2055 04/25/17 2054  CBC auto differential  STAT      Final result     04/25/17 2055 04/25/17 2054  Comprehensive metabolic panel  STAT      Final result     04/25/17 2055 04/25/17 2054  Protime-INR  STAT      Final result     04/25/17 2055 04/25/17 2054  Troponin I  STAT     Comments:  PLEASE REVIEW ORDER START TIME BEFORE MARKING SPECIMEN COLLECTED.    Final result     04/25/17 2055 04/25/17 2054  B-Type natriuretic peptide (BNP)  STAT      Final result       ED Imaging Orders     Start Ordered       Status Ordering Provider    04/25/17 2055 04/25/17 2054  X-Ray Chest PA And Lateral  1 time imaging      Final result         Discharge Instructions         Shortness of Breath (Dyspnea)  Shortness of breath is the feeling that you can't catch your breath or get enough air. It is also known as dyspnea.  Dyspnea can be caused by many different conditions. They include:  · Acute asthma attack.  · Worsening of chronic lung diseases such as chronic bronchitis and emphysema.  · Heart failure. This is when weak heart muscle allows extra fluid to collect in " the lungs.  · Panic attacks or anxiety. Fear can cause rapid breathing (hyperventilation).  · Pneumonia, or an infection in the lung tissue.  · Exposure to toxic substances, fumes, smoke, or certain medicines.  · Blood clot in the lung (pulmonary embolism). This is often from a piece of blood clot in a deep vein of the leg (deep vein thrombosis) that breaks off and travels to the lungs.  · Heart attack or heart-related chest pain (angina).  · Anemia.  · Collapsed lung (pneumothorax).  · Dehydration.  · Pregnancy.  Based on your visit today, the exact cause of your shortness of breath is not certain. Your tests dont show any of the serious causes of dyspnea. You may need other tests to find out if you have a serious problem. Its important to watch for any new symptoms or symptoms that get worse. Follow up with your healthcare provider as directed.  Home care  Follow these tips to take care of yourself at home:  · When your symptoms are better, go back to your usual activities.  · If you smoke, you should stop. Join a quit-smoking program or ask your healthcare provider for help.  · Eat a healthy diet and get plenty of sleep.  · Get regular exercise. Talk with your healthcare provider before starting to exercise, especially if you have other medical problems.  · Cut down on the amount of caffeine and stimulants you consume.  Follow-up care  Follow up with your healthcare provider, or as advised.  If tests were done, you will be told if your treatment needs to be changed. You can call as directed for the results.  (Note: If an X-ray was taken, a specialist will review it. You will be notified of any new findings that may affect your care.)  Call 911 or get immediate medical care  Shortness of breath may be a sign of a serious medical problem. For example, it may be a problem with your heart or lungs. Call 911 if you have worsening shortness of breath or trouble breathing, especially with any of the symptoms  below:  · You are confused or its difficult to wake you.  · You faint or lose consciousness.  · You have a fast heartbeat, or your heartbeat is irregular.  · You are coughing up blood.  · You have pain in your chest, arm, shoulder, neck, or upper back.  · You break out in a sweat.  When to seek medical advice  Call your healthcare provider right away if any of these occur:  · Slight shortness of breath or wheezing  · Redness, pain or swelling in your leg, arm, or other body area  · Swelling in both legs or ankles  · Fast weight gain  · Dizziness or weakness  · Fever of 100.4ºF (38ºC) or higher, or as directed by your healthcare provider  Date Last Reviewed: 9/13/2015 © 2000-2016 [employer]. [address] [place] [address]. All rights reserved. This information is not intended as a substitute for professional medical care. Always follow your healthcare professional's instructions.          Smoking Cessation     If you would like to quit smoking:   You may be eligible for free services if you are a [place] resident and started smoking cigarettes before September 1, 1988.  Call the Smoking Cessation Trust ([employer]) toll free at (482) 465-7071 or (079) 131-7510.   Call 1-800-QUIT-NOW if you do not meet the above criteria.   Contact us via email: [email or web address]   View our website for more information: [email or web address]         [facility] complies with applicable Federal civil rights laws and does not discriminate on the basis of race, color, national origin, age, disability, or sex.        Language Assistance Services     ATTENTION: Language assistance services are available, free of charge. Please call [phone].      ATENCIÓN: Si habla español, tiene a jack disposición servicios gratuitos de asistencia lingüística. Llame al [phone].     CHÚ Ý: N?u b?n nói Ti?ng Vi?t, có các d?ch v? h? tr? ngôn ng? mi?n phí dành cho b?n. G?i s?  [phone].

## 2017-04-26 ENCOUNTER — PATIENT MESSAGE (OUTPATIENT)
Dept: CARDIOTHORACIC SURGERY | Facility: CLINIC | Age: 52
End: 2017-04-26

## 2017-04-26 LAB — D DIMER PPP IA.FEU-MCNC: 0.49 MG/L FEU

## 2017-04-26 NOTE — ED TRIAGE NOTES
"Pt states she hasnt been breathing very well started about 3 days ago. Pt states she had trouble getting deep breaths and painful breathing on her left side radiating to her back. States she feels "wheezy". Pt states she been having nasal congestion the past 3 days. No c/o N/V or headaches.  Pt currently not in distress, 99% O2 sat on room air.   "

## 2017-04-26 NOTE — DISCHARGE INSTRUCTIONS

## 2017-04-26 NOTE — ED PROVIDER NOTES
"Encounter Date: 4/25/2017    SCRIBE #1 NOTE: I, Betotiana Muñoz, am scribing for, and in the presence of,  Moraima Walsh MD. I have scribed the following portions of the note - Other sections scribed: HPI, ROS, PE.       History     Chief Complaint   Patient presents with    Shortness of Breath     Had CABG 10 weeks ago, now has "raspy feeling in left lower lung with dry cough, and can't take a deep breath in". Took Corisidan for "congestion". Has history of "anxiety".     Review of patient's allergies indicates:   Allergen Reactions    Prednisone      HPI Comments: CC: Shortness of Breath    HPI: This 52 year old female has a past medical history of hypertension, hypothyroidism, panic attack, and renal disorder presents to the ED complaining of a one day history of shortness of breath with associated congestion. Pt states pain is exacerbated upon deep breathing. Pt reports that she had a CABG 10 weeks ago and began cardiac rehab last week. Pt reports taking aspirin following the CABG. She also quit smoking and lost 14 lbs after the surgery. Pt's PCP is Dr. Kelly. No other symptoms reported.       The history is provided by the patient. No  was used.     Past Medical History:   Diagnosis Date    Hypertension     Hypothyroidism     Panic attack     Renal disorder     Kidney stones     Past Surgical History:   Procedure Laterality Date    CHOLECYSTECTOMY      cystoscope      VAGINA SURGERY       No family history on file.  Social History   Substance Use Topics    Smoking status: Current Every Day Smoker     Packs/day: 0.50     Types: Cigarettes    Smokeless tobacco: Not on file    Alcohol use No     Review of Systems   Constitutional: Negative for fever.   HENT: Positive for congestion. Negative for sore throat.    Respiratory: Positive for shortness of breath (excerbated upon breathing).    Gastrointestinal: Negative for nausea.   Genitourinary: Negative for dysuria. "   Musculoskeletal: Negative for back pain.   Skin: Negative for rash.   Neurological: Negative for weakness.   Hematological: Does not bruise/bleed easily.       Physical Exam   Initial Vitals   BP Pulse Resp Temp SpO2   04/25/17 2046 04/25/17 2046 04/25/17 2046 04/25/17 2046 04/25/17 2046   133/70 80 18 98.1 °F (36.7 °C) 99 %     Physical Exam    Nursing note and vitals reviewed.  Constitutional: She appears well-developed and well-nourished.   HENT:   Head: Normocephalic and atraumatic.   Nose: Nose normal.   Eyes: EOM and lids are normal.   Neck: Neck supple.   Cardiovascular: Normal rate and regular rhythm.   Pulmonary/Chest: No respiratory distress.   Abdominal: Normal appearance.   Musculoskeletal: Normal range of motion.   Neurological: She is alert.   Skin: Skin is warm and dry.   Healing incision to chest   Psychiatric: She has a normal mood and affect. Her behavior is normal. Thought content normal.         ED Course   Procedures  Labs Reviewed   CBC W/ AUTO DIFFERENTIAL - Abnormal; Notable for the following:        Result Value    Hemoglobin 11.9 (*)     MCHC 31.2 (*)     All other components within normal limits    Narrative:     PLEASE REVIEW ORDER START TIME BEFORE MARKING SPECIMEN  COLLECTED.   COMPREHENSIVE METABOLIC PANEL - Abnormal; Notable for the following:     ALT 8 (*)     All other components within normal limits    Narrative:     PLEASE REVIEW ORDER START TIME BEFORE MARKING SPECIMEN  COLLECTED.   PROTIME-INR    Narrative:     PLEASE REVIEW ORDER START TIME BEFORE MARKING SPECIMEN  COLLECTED.   TROPONIN I    Narrative:     PLEASE REVIEW ORDER START TIME BEFORE MARKING SPECIMEN  COLLECTED.   B-TYPE NATRIURETIC PEPTIDE    Narrative:     PLEASE REVIEW ORDER START TIME BEFORE MARKING SPECIMEN  COLLECTED.   D DIMER, QUANTITATIVE     EKG Readings: (Independently Interpreted)   Initial Reading: No STEMI. Rhythm: Normal Sinus Rhythm. Heart Rate: 70. Ectopy: No Ectopy.       X-Rays:   Independently  Interpreted Readings:   Chest X-Ray: No infiltrates.     Medical Decision Making:   History:   Old Medical Records: I decided to obtain old medical records.  Initial Assessment:   This is an emergent evaluation of a 52-year-old woman who presented emergency department today secondary to shortness of breath with associated congestion.  Differential Diagnosis:   Acute coronary syndrome, pneumonia, pulmonary embolus, sequelae of recent cardiac rehabilitation  Independently Interpreted Test(s):   I have ordered and independently interpreted X-rays - see prior notes.  I have ordered and independently interpreted EKG Reading(s) - see prior notes  Clinical Tests:   Lab Tests: Ordered and Reviewed  The following lab test(s) were unremarkable: CBC, CMP, Troponin, BNP and D-Dimer  Radiological Study: Ordered and Reviewed  Medical Tests: Ordered and Reviewed  ED Management:  On physical examination, patient was in no acute distress.  Lungs were clear to auscultation bilaterally and heart sounds were within normal limits.  Examination of her anterior chest wall revealed a healing incision site.  Patient was not short of breath despite being extremely verbose. No hypoxia. Labs reassuring with no elevation of Troponin or BNP. D-Dimer negative. CXR unremarkable. Suspect patient's symptoms may be secondary to her recent initiation of cardiac rehab and increased exercising. However, have strongly advised her to follow with her cardiologist as scheduled. Return precautions given including any development of worsening SOB, any chest pain, fevers, or for any other concerns.     Moraima Walsh MD  12:58 AM  4/26/2017               Scribe Attestation:   Scribe #1: I performed the above scribed service and the documentation accurately describes the services I performed. I attest to the accuracy of the note.    Attending Attestation:           Physician Attestation for Scribe:  Physician Attestation Statement for Scribe #1: I, Moraima Walsh MD,  reviewed documentation, as scribed by Jose Eduardo Muñoz in my presence, and it is both accurate and complete.                 ED Course     Clinical Impression:   The encounter diagnosis was Dyspnea, unspecified type.          Moraima Walsh MD  04/26/17 0058

## 2017-05-09 ENCOUNTER — OFFICE VISIT (OUTPATIENT)
Dept: ENDOCRINOLOGY | Facility: CLINIC | Age: 52
End: 2017-05-09
Payer: COMMERCIAL

## 2017-05-09 VITALS
SYSTOLIC BLOOD PRESSURE: 120 MMHG | HEART RATE: 62 BPM | HEIGHT: 64 IN | WEIGHT: 231.69 LBS | BODY MASS INDEX: 39.55 KG/M2 | DIASTOLIC BLOOD PRESSURE: 82 MMHG

## 2017-05-09 DIAGNOSIS — E55.9 VITAMIN D DEFICIENCY: ICD-10-CM

## 2017-05-09 DIAGNOSIS — E03.4 HYPOTHYROIDISM DUE TO ACQUIRED ATROPHY OF THYROID: Primary | ICD-10-CM

## 2017-05-09 DIAGNOSIS — R73.03 PREDIABETES: ICD-10-CM

## 2017-05-09 PROBLEM — E03.9 HYPOTHYROID: Status: RESOLVED | Noted: 2017-02-15 | Resolved: 2017-05-09

## 2017-05-09 PROCEDURE — 1160F RVW MEDS BY RX/DR IN RCRD: CPT | Mod: S$GLB,,, | Performed by: INTERNAL MEDICINE

## 2017-05-09 PROCEDURE — 3074F SYST BP LT 130 MM HG: CPT | Mod: S$GLB,,, | Performed by: INTERNAL MEDICINE

## 2017-05-09 PROCEDURE — 3079F DIAST BP 80-89 MM HG: CPT | Mod: S$GLB,,, | Performed by: INTERNAL MEDICINE

## 2017-05-09 PROCEDURE — 99214 OFFICE O/P EST MOD 30 MIN: CPT | Mod: S$GLB,,, | Performed by: INTERNAL MEDICINE

## 2017-05-09 PROCEDURE — 99999 PR PBB SHADOW E&M-EST. PATIENT-LVL III: CPT | Mod: PBBFAC,,, | Performed by: INTERNAL MEDICINE

## 2017-05-09 RX ORDER — ERGOCALCIFEROL 1.25 MG/1
50000 CAPSULE ORAL
Qty: 12 CAPSULE | Refills: 3 | Status: SHIPPED | OUTPATIENT
Start: 2017-05-09 | End: 2018-06-06 | Stop reason: SDUPTHER

## 2017-05-09 RX ORDER — LEVOTHYROXINE SODIUM 100 UG/1
1 TABLET ORAL DAILY
Refills: 4 | COMMUNITY
Start: 2017-04-17 | End: 2017-05-09 | Stop reason: SDUPTHER

## 2017-05-09 RX ORDER — ATORVASTATIN CALCIUM 80 MG/1
1 TABLET, FILM COATED ORAL DAILY
Refills: 2 | COMMUNITY
Start: 2017-03-06 | End: 2017-07-27

## 2017-05-09 RX ORDER — LEVOTHYROXINE SODIUM 100 UG/1
TABLET ORAL
Qty: 115 TABLET | Refills: 3 | Status: SHIPPED | OUTPATIENT
Start: 2017-05-09 | End: 2018-12-06

## 2017-05-09 RX ORDER — ONDANSETRON 4 MG/1
1 TABLET, ORALLY DISINTEGRATING ORAL
Refills: 0 | COMMUNITY
Start: 2017-03-27

## 2017-05-09 NOTE — PROGRESS NOTES
Subjective:     Patient ID: Jolynn Mancia is a 52 y.o. female.    Chief Complaint: No chief complaint on file.    HPI:   Ms. Mancia is a 52 y.o. female who is here for a consult visit for evaluation abnormal thyroid function tests and prediabetes.     Menopause occurred at age 43 and two years later developed hypothyroidism and has used synthroid since diagnosis.     She reports feeling fatigued during the day. Reports cold intolerance and constipation (this is new since CABG - 2/2017) . Appetite is normal, eats three meals and three snacks. Denies lower extremity swelling. Cardiac rehab is going well.     Former smoker 2/14/2017.   Currently taking synthroid 100 mcg, on an empty stomach, has coffee twenty minutes later.     Eats healthy and is undergoing cardiac rehab.     Has five sisters (3 with hypothyroidism)    Review of Systems   Constitutional: Negative for chills and fever.   HENT: Negative for congestion and sinus pressure.    Eyes: Negative for visual disturbance.   Respiratory: Negative for chest tightness and shortness of breath.    Cardiovascular: Negative for chest pain, palpitations and leg swelling.   Gastrointestinal: Positive for nausea. Negative for abdominal pain and vomiting.   Genitourinary: Negative for dysuria.   Musculoskeletal: Negative for arthralgias.   Skin: Negative for rash.   Neurological: Negative for weakness.   Hematological: Does not bruise/bleed easily.   Psychiatric/Behavioral: Negative for sleep disturbance.        Objective:     Physical Exam   Constitutional: She is oriented to person, place, and time. She appears well-developed and well-nourished. No distress.   Eyes: Conjunctivae and EOM are normal. Pupils are equal, round, and reactive to light. No scleral icterus.   Neck: Normal range of motion. Neck supple. No thyromegaly present.   Cardiovascular: Normal rate and intact distal pulses.    Pulmonary/Chest: Effort normal and breath sounds normal.   Neurological: She is  "alert and oriented to person, place, and time. She has normal reflexes.   No tremor.   Skin: Skin is warm and dry.   Psychiatric: She has a normal mood and affect.       Vitals:    05/09/17 1321   BP: 120/82   BP Location: Left arm   Patient Position: Sitting   BP Method: Manual   Pulse: 62   Weight: 105.1 kg (231 lb 11.3 oz)   Height: 5' 4" (1.626 m)        3/2017 [facility]:  TSH 5.6   hgb 10.7  Vitamin D 12  Results for PATITO CHANG (MRN [id number]) as of 5/9/2017 13:05   Ref. Range 2/22/2017 00:30   Hemoglobin A1C Latest Ref Range: 4.5 - 6.2 % 5.8   Estimated Avg Glucose Latest Ref Range: 68 - 131 mg/dL 120   TSH Latest Ref Range: 0.400 - 4.000 uIU/mL 5.746 (H)   Free T4 Latest Ref Range: 0.71 - 1.51 ng/dL 0.77     Assessment/Plan:     1. Hypothyroidism due to acquired atrophy of thyroid  - subclinical hypothyroidism two months after surgery without improvement  - have made a very small change to synthroid 7 1/2 pills weekly   - TSH; Future - in three months.     2. Prediabetes  - discussed importance of diet and exercise  - Hemoglobin A1c; Future    3. Vitamin D deficiency  - start 50K once weekly for three to six months  - OK to repeat with PCP, goal is 30 - 32   - may be causing fatigue      LABS in three months  F/u in one year    "

## 2017-05-09 NOTE — MR AVS SNAPSHOT
[facility]  [address]  [place] [address]  Phone: [phone]  Fax: [phone]                  Jolynn Mancia   2017 1:00 PM   Office Visit    Description:  Female : 1965   Provider:  Monica Tello MD   Department:  [facility]           Reason for Visit     Hypothyroidism           Diagnoses this Visit        Comments    Hypothyroidism due to acquired atrophy of thyroid    -  Primary     Prediabetes         Vitamin D deficiency                To Do List           Future Appointments        Provider Department Dept Phone    2017 7:00 AM LAB, [place] [facility] [phone]      Goals (5 Years of Data)     None      Follow-Up and Disposition     Follow-up and Disposition History       These Medications        Disp Refills Start End    SYNTHROID 100 mcg tablet 115 tablet 3 2017     Take one tablet daily except on sundays take 1 1/2 pills.    Pharmacy: [facility]  [place] AT [facility] [address] [place] Ph #: [phone]       ergocalciferol (ERGOCALCIFEROL) 50,000 unit Cap 12 capsule 3 2017     Take 1 capsule (50,000 Units total) by mouth every 7 days. - Oral    Pharmacy: [facility] [address] AT [facility] [address] Ph #: [phone]         [facility]     [facility] -  Assistance  Unless otherwise directed by your provider, please contact [facility], our nurse care line that is available for 24/ assistance.     Registered nurses in the [facility] provide: appointment scheduling, clinical advisement, health education, and other advisory services.  Call: [phone] (toll free)               Medications           Message regarding Medications     Verify the changes and/or additions to your medication regime listed below are the same as discussed with your clinician  "today.  If any of these changes or additions are incorrect, please notify your healthcare provider.        START taking these NEW medications        Refills    SYNTHROID 100 mcg tablet 3    Sig: Take one tablet daily except on sundays take 1 1/2 pills.    Class: Normal    ergocalciferol (ERGOCALCIFEROL) 50,000 unit Cap 3    Sig: Take 1 capsule (50,000 Units total) by mouth every 7 days.    Class: Normal    Route: Oral           Verify that the below list of medications is an accurate representation of the medications you are currently taking.  If none reported, the list may be blank. If incorrect, please contact your healthcare provider. Carry this list with you in case of emergency.           Current Medications     aspirin (ECOTRIN) 325 MG EC tablet Take 1 tablet (325 mg total) by mouth once daily.    atorvastatin (LIPITOR) 80 MG tablet Take 1 tablet by mouth once daily at 6am.    lisinopril (PRINIVIL,ZESTRIL) 20 MG tablet Take 20 mg by mouth once daily.    metoprolol tartrate (LOPRESSOR) 25 MG tablet Take 1 tablet (25 mg total) by mouth 2 (two) times daily.    ondansetron (ZOFRAN-ODT) 4 MG TbDL Take 1 tablet by mouth as needed.    SYNTHROID 100 mcg tablet Take one tablet daily except on sundays take 1 1/2 pills.    ergocalciferol (ERGOCALCIFEROL) 50,000 unit Cap Take 1 capsule (50,000 Units total) by mouth every 7 days.           Clinical Reference Information           Your Vitals Were     BP Pulse Height Weight BMI    120/82 (BP Location: Left arm, Patient Position: Sitting, BP Method: Manual) 62 5' 4" (1.626 m) 105.1 kg (231 lb 11.3 oz) 39.77 kg/m2      Blood Pressure          Most Recent Value    BP  120/82      Allergies as of 5/9/2017     Prednisone      Immunizations Administered on Date of Encounter - 5/9/2017     None      Orders Placed During Today's Visit     Future Labs/Procedures Expected by Expires    Hemoglobin A1c  5/9/2017 7/8/2018    TSH  5/9/2017 7/8/2018      Smoking Cessation     If you would " like to quit smoking:   You may be eligible for free services if you are a [place] resident and started smoking cigarettes before September 1, 1988.  Call the [employer]oking [employer]essation [employer] ([employer]) toll free at (868) 834-7296 or (152) 810-2533.   Call 1-800-QUIT-NOW if you do not meet the above criteria.   Contact us via email: [email or web address]   View our website for more information: [email or web address]        Language Assistance Services     ATTENTION: Language assistance services are available, free of charge. Please call [phone].      ATENCIÓN: Si habla español, tiene a jack disposición servicios gratuitos de asistencia lingüística. Llame al [phone].     CHÚ Ý: N?u b?n nói Ti?ng Vi?t, có các d?ch v? h? tr? ngôn ng? mi?n phí dành cho b?n. G?i s? [phone].         [facility] complies with applicable Federal civil rights laws and does not discriminate on the basis of race, color, national origin, age, disability, or sex.

## 2017-05-19 ENCOUNTER — HOSPITAL ENCOUNTER (OUTPATIENT)
Facility: HOSPITAL | Age: 52
Discharge: HOME | End: 2017-05-20
Attending: EMERGENCY MEDICINE | Admitting: HOSPITALIST
Payer: COMMERCIAL

## 2017-05-19 DIAGNOSIS — Z95.1 S/P CABG X 3: ICD-10-CM

## 2017-05-19 DIAGNOSIS — R07.9 CHEST PAIN: ICD-10-CM

## 2017-05-19 DIAGNOSIS — R07.9 CHEST PAIN, UNSPECIFIED TYPE: Primary | ICD-10-CM

## 2017-05-19 DIAGNOSIS — I25.10 CORONARY ARTERY DISEASE INVOLVING NATIVE CORONARY ARTERY OF NATIVE HEART WITHOUT ANGINA PECTORIS: ICD-10-CM

## 2017-05-19 DIAGNOSIS — I10 HTN (HYPERTENSION), BENIGN: ICD-10-CM

## 2017-05-19 DIAGNOSIS — E03.4 HYPOTHYROIDISM DUE TO ACQUIRED ATROPHY OF THYROID: ICD-10-CM

## 2017-05-19 LAB
ALBUMIN SERPL BCP-MCNC: 3.6 G/DL
ALP SERPL-CCNC: 131 U/L
ALT SERPL W/O P-5'-P-CCNC: 16 U/L
ANION GAP SERPL CALC-SCNC: 7 MMOL/L
AST SERPL-CCNC: 19 U/L
BASOPHILS # BLD AUTO: 0.04 K/UL
BASOPHILS NFR BLD: 0.5 %
BILIRUB SERPL-MCNC: 0.3 MG/DL
BUN SERPL-MCNC: 11 MG/DL
CALCIUM SERPL-MCNC: 9.4 MG/DL
CHLORIDE SERPL-SCNC: 109 MMOL/L
CO2 SERPL-SCNC: 28 MMOL/L
CREAT SERPL-MCNC: 0.9 MG/DL
DIFFERENTIAL METHOD: ABNORMAL
EOSINOPHIL # BLD AUTO: 0.3 K/UL
EOSINOPHIL NFR BLD: 3.7 %
ERYTHROCYTE [DISTWIDTH] IN BLOOD BY AUTOMATED COUNT: 13.5 %
EST. GFR  (AFRICAN AMERICAN): >60 ML/MIN/1.73 M^2
EST. GFR  (NON AFRICAN AMERICAN): >60 ML/MIN/1.73 M^2
GLUCOSE SERPL-MCNC: 97 MG/DL
HCT VFR BLD AUTO: 38.2 %
HGB BLD-MCNC: 12 G/DL
INR PPP: 0.9
LYMPHOCYTES # BLD AUTO: 1.5 K/UL
LYMPHOCYTES NFR BLD: 18.8 %
MCH RBC QN AUTO: 29.1 PG
MCHC RBC AUTO-ENTMCNC: 31.4 %
MCV RBC AUTO: 93 FL
MONOCYTES # BLD AUTO: 0.5 K/UL
MONOCYTES NFR BLD: 6.8 %
NEUTROPHILS # BLD AUTO: 5.5 K/UL
NEUTROPHILS NFR BLD: 69.9 %
PLATELET # BLD AUTO: 280 K/UL
PMV BLD AUTO: 11.9 FL
POTASSIUM SERPL-SCNC: 4.4 MMOL/L
PROT SERPL-MCNC: 7.2 G/DL
PROTHROMBIN TIME: 9.6 SEC
RBC # BLD AUTO: 4.13 M/UL
SODIUM SERPL-SCNC: 144 MMOL/L
TROPONIN I SERPL DL<=0.01 NG/ML-MCNC: <0.006 NG/ML
WBC # BLD AUTO: 7.8 K/UL

## 2017-05-19 PROCEDURE — 99285 EMERGENCY DEPT VISIT HI MDM: CPT | Mod: 25

## 2017-05-19 PROCEDURE — 93005 ELECTROCARDIOGRAM TRACING: CPT

## 2017-05-19 PROCEDURE — 85025 COMPLETE CBC W/AUTO DIFF WBC: CPT

## 2017-05-19 PROCEDURE — 36415 COLL VENOUS BLD VENIPUNCTURE: CPT

## 2017-05-19 PROCEDURE — 85610 PROTHROMBIN TIME: CPT

## 2017-05-19 PROCEDURE — 84484 ASSAY OF TROPONIN QUANT: CPT | Mod: 91

## 2017-05-19 PROCEDURE — 25000003 PHARM REV CODE 250: Performed by: INTERNAL MEDICINE

## 2017-05-19 PROCEDURE — 25000003 PHARM REV CODE 250: Performed by: PHYSICIAN ASSISTANT

## 2017-05-19 PROCEDURE — 80053 COMPREHEN METABOLIC PANEL: CPT

## 2017-05-19 PROCEDURE — G0378 HOSPITAL OBSERVATION PER HR: HCPCS

## 2017-05-19 PROCEDURE — 84484 ASSAY OF TROPONIN QUANT: CPT

## 2017-05-19 PROCEDURE — 99219 PR INITIAL OBSERVATION CARE,LEVL II: CPT | Mod: ,,, | Performed by: PHYSICIAN ASSISTANT

## 2017-05-19 PROCEDURE — 99285 EMERGENCY DEPT VISIT HI MDM: CPT | Mod: ,,, | Performed by: EMERGENCY MEDICINE

## 2017-05-19 PROCEDURE — 93010 ELECTROCARDIOGRAM REPORT: CPT | Mod: ,,, | Performed by: INTERNAL MEDICINE

## 2017-05-19 RX ORDER — LEVOTHYROXINE SODIUM 100 UG/1
100 TABLET ORAL
Status: DISCONTINUED | OUTPATIENT
Start: 2017-05-20 | End: 2017-05-20 | Stop reason: HOSPADM

## 2017-05-19 RX ORDER — METOPROLOL TARTRATE 25 MG/1
25 TABLET, FILM COATED ORAL 2 TIMES DAILY
Status: DISCONTINUED | OUTPATIENT
Start: 2017-05-19 | End: 2017-05-20 | Stop reason: HOSPADM

## 2017-05-19 RX ORDER — POLYETHYLENE GLYCOL 3350 17 G/17G
17 POWDER, FOR SOLUTION ORAL DAILY
Status: DISCONTINUED | OUTPATIENT
Start: 2017-05-19 | End: 2017-05-20 | Stop reason: HOSPADM

## 2017-05-19 RX ORDER — ATORVASTATIN CALCIUM 20 MG/1
80 TABLET, FILM COATED ORAL DAILY
Status: DISCONTINUED | OUTPATIENT
Start: 2017-05-19 | End: 2017-05-20 | Stop reason: HOSPADM

## 2017-05-19 RX ORDER — BISACODYL 5 MG
5 TABLET, DELAYED RELEASE (ENTERIC COATED) ORAL DAILY
Status: DISCONTINUED | OUTPATIENT
Start: 2017-05-19 | End: 2017-05-20 | Stop reason: HOSPADM

## 2017-05-19 RX ORDER — ASPIRIN 325 MG
325 TABLET, DELAYED RELEASE (ENTERIC COATED) ORAL DAILY
Status: DISCONTINUED | OUTPATIENT
Start: 2017-05-20 | End: 2017-05-20 | Stop reason: HOSPADM

## 2017-05-19 RX ORDER — LISINOPRIL 20 MG/1
20 TABLET ORAL DAILY
Status: DISCONTINUED | OUTPATIENT
Start: 2017-05-20 | End: 2017-05-20 | Stop reason: HOSPADM

## 2017-05-19 RX ORDER — ONDANSETRON 8 MG/1
8 TABLET, ORALLY DISINTEGRATING ORAL EVERY 8 HOURS PRN
Status: DISCONTINUED | OUTPATIENT
Start: 2017-05-19 | End: 2017-05-20 | Stop reason: HOSPADM

## 2017-05-19 RX ORDER — ONDANSETRON 2 MG/ML
4 INJECTION INTRAMUSCULAR; INTRAVENOUS EVERY 12 HOURS PRN
Status: DISCONTINUED | OUTPATIENT
Start: 2017-05-19 | End: 2017-05-20 | Stop reason: HOSPADM

## 2017-05-19 RX ORDER — ATORVASTATIN CALCIUM 20 MG/1
80 TABLET, FILM COATED ORAL DAILY
Status: DISCONTINUED | OUTPATIENT
Start: 2017-05-20 | End: 2017-05-19

## 2017-05-19 RX ORDER — ACETAMINOPHEN 325 MG/1
650 TABLET ORAL EVERY 6 HOURS PRN
Status: DISCONTINUED | OUTPATIENT
Start: 2017-05-19 | End: 2017-05-20 | Stop reason: HOSPADM

## 2017-05-19 RX ADMIN — ATORVASTATIN CALCIUM 80 MG: 20 TABLET, FILM COATED ORAL at 09:05

## 2017-05-19 RX ADMIN — METOPROLOL TARTRATE 25 MG: 25 TABLET ORAL at 08:05

## 2017-05-19 NOTE — ED NOTES
GENERAL: The patient is a well-developed, obese female in no apparent distress. He is alert and oriented x3.    HEENT: Head is normocephalic and atraumatic. Extraocular muscles are intact. Pupils are equal, round, and reactive to light and accommodation. Nares appeared normal. Mouth is well hydrated and without lesions. Mucous membranes are moist. Posterior pharynx clear of any exudate or lesions.    NECK: Supple. No carotid bruits. No lymphadenopathy or thyromegaly.    LUNGS: Clear to auscultation.    HEART: Regular rate and rhythm without murmur.     ABDOMEN: Soft, nontender, and nondistended. Positive bowel sounds. No hepatosplenomegaly was noted.     EXTREMITIES: Without any cyanosis, clubbing, rash, lesions or edema.     NEUROLOGIC: Cranial nerves II through XII are grossly intact.     PSYCHIATRIC: Denies suicidal or homicidal ideations.    SKIN: No ulceration or induration present.

## 2017-05-19 NOTE — IP AVS SNAPSHOT
[facility]  [address]  [facility] [address]  Phone: [phone]           Patient Discharge Instructions   Our goal is to set you up for success. This packet includes information on your condition, medications, and your home care.  It will help you care for yourself to prevent having to return to the hospital.     Please ask your nurse if you have any questions.      There are many details to remember when preparing to leave the hospital. Here is what you will need to do:    1. Take your medicine. If you are prescribed medications, review your Medication List on the following pages. You may have new medications to  at the pharmacy and others that you'll need to stop taking. Review the instructions for how and when to take your medications. Talk with your doctor or nurses if you are unsure of what to do.     2. Go to your follow-up appointments. Specific follow-up information is listed in the following pages. Your may be contacted by a nurse or clinical provider about future appointments. Be sure we have all of the phone numbers to reach you. Please contact your provider's office if you are unable to make an appointment.     3. Watch for warning signs. Your doctor or nurse will give you detailed warning signs to watch for and when to call for assistance. These instructions may also include educational information about your condition. If you experience any of warning signs to your health, call your doctor.           [facility]  Unless otherwise directed by your provider, please   contact [facility], our nurse care line   that is available for 24/7 assistance.     [phone] (toll-free)     Registered nurses in the [facility]   provide: appointment scheduling, clinical advisement, health education, and other advisory services.                  ** Verify the list of medication(s) below is accurate and up to date. Carry this with you in case of  emergency. If your medications have changed, please notify your healthcare provider.             Medication List      CONTINUE taking these medications        Additional Info                      aspirin 325 MG EC tablet   Commonly known as:  ECOTRIN   Refills:  0   Dose:  325 mg    Last time this was given:  325 mg on 5/20/2017  8:41 AM   Instructions:  Take 1 tablet (325 mg total) by mouth once daily.     Begin Date    AM    Noon    PM    Bedtime       atorvastatin 80 MG tablet   Commonly known as:  LIPITOR   Refills:  2   Dose:  1 tablet    Last time this was given:  80 mg on 5/19/2017  9:57 PM   Instructions:  Take 1 tablet by mouth once daily at 6am.     Begin Date    AM    Noon    PM    Bedtime       ergocalciferol 50,000 unit Cap   Commonly known as:  ERGOCALCIFEROL   Quantity:  12 capsule   Refills:  3   Dose:  96532 Units    Instructions:  Take 1 capsule (50,000 Units total) by mouth every 7 days.     Begin Date    AM    Noon    PM    Bedtime       lisinopril 20 MG tablet   Commonly known as:  PRINIVIL,ZESTRIL   Refills:  0   Dose:  20 mg    Last time this was given:  20 mg on 5/20/2017  8:41 AM   Instructions:  Take 20 mg by mouth once daily.     Begin Date    AM    Noon    PM    Bedtime       metoprolol tartrate 25 MG tablet   Commonly known as:  LOPRESSOR   Quantity:  60 tablet   Refills:  11   Dose:  25 mg    Last time this was given:  25 mg on 5/20/2017  8:41 AM   Instructions:  Take 1 tablet (25 mg total) by mouth 2 (two) times daily.     Begin Date    AM    Noon    PM    Bedtime       ondansetron 4 MG Tbdl   Commonly known as:  ZOFRAN-ODT   Refills:  0   Dose:  1 tablet    Instructions:  Take 1 tablet by mouth as needed.     Begin Date    AM    Noon    PM    Bedtime       SYNTHROID 100 MCG tablet   Quantity:  115 tablet   Refills:  3   Generic drug:  levothyroxine    Last time this was given:  100 mcg on 5/20/2017  6:20 AM   Instructions:  Take one tablet daily except on sundays take 1 1/2 pills.      Begin Date    AM    Noon    PM    Bedtime                  Please bring to all follow up appointments:    1. A copy of your discharge instructions.  2. All medicines you are currently taking in their original bottles.  3. Identification and insurance card.    Please arrive 15 minutes ahead of scheduled appointment time.    Please call 24 hours in advance if you must reschedule your appointment and/or time.        Your Scheduled Appointments     Aug 09, 2017  7:00 AM CDT   Non-Fasting Lab with LAB, [facility]   [facility]    [address]  [place] [address] [phone]              Follow-up Information     Follow up with Corey [facility]ineda - Cardiology.    Specialty:  Cardiology    Why:  Please follow up with cardiology for outpatient PET stress test.    Contact information:    [address]  [facility] [address] [phone]    Additional information:    3rd floor        Follow up with Corey Pineda - Otorhinolaryngology.    Specialty:  Otolaryngology    Why:  Please follow up with ENT, the office will call you to schedule appointment.     Contact information:    [address]  [facility] [address] [phone]    Additional information:    [facility] [place] - 4th Floor      Referrals     Future Orders    Ambulatory Referral to Cardiology     Ambulatory Referral to ENT         Discharge Instructions     Future Orders    Activity as tolerated     Call MD for:  difficulty breathing or increased cough     Call MD for:  persistent dizziness, light-headedness, or visual disturbances     Call MD for:  severe uncontrolled pain     Diet Cardiac         Primary Diagnosis     Your primary diagnosis was:  Chest Pain      Admission Information     Date & Time Provider Department [facility]    5/19/2017  8:57 AM Bassam Lugo MD [facility] [id number]      Care Providers     Provider Role Specialty Primary office phone    Bassam Lugo MD Attending  "Provider Hospitalist [phone]    Abilio Russell MD Team Attending  Hospitalist [phone]    Bassam Lugo MD Team Attending  Hospitalist [phone]      Your Vitals Were     BP Pulse Temp Resp Height Weight    143/80 91 97.3 °F (36.3 °C) (Oral) 20 5' 4" (1.626 m) 105.8 kg (233 lb 4 oz)    SpO2 BMI             99% 40.04 kg/m2         Recent Lab Values        2/22/2017                          12:30 AM           A1C 5.8           Comment for A1C at 12:30 AM on 2/22/2017:  According to ADA guidelines, hemoglobin A1C <7.0% represents  optimal control in non-pregnant diabetic patients.  Different  metrics may apply to specific populations.   Standards of Medical Care in Diabetes - 2016.  For the purpose of screening for the presence of diabetes:  <5.7%     Consistent with the absence of diabetes  5.7-6.4%  Consistent with increasing risk for diabetes   (prediabetes)  >or=6.5%  Consistent with diabetes  Currently no consensus exists for use of hemoglobin A1C  for diagnosis of diabetes for children.        Allergies as of 5/20/2017        Reactions    Prednisone       Advance Directives     An advance directive is a document which, in the event you are no longer able to make decisions for yourself, tells your healthcare team what kind of treatment you do or do not want to receive, or who you would like to make those decisions for you.  If you do not currently have an advance directive, [facility] encourages you to create one.  For more information call:  [phone]), [phone] ([phone]),  or log on to [email or web address].        Smoking Cessation     If you would like to quit smoking:   You may be eligible for free services if you are a [place] resident and started smoking cigarettes before September 1, 1988.  Call the Smoking Cessation Trust (SCT) toll free at (585) 633-6960 or (037) 791-9766.   Call 8-800-QUIT-NOW if you do not meet the above criteria.   Contact us via email: " [email or web address]   View our website for more information: [email or web address]        Language Assistance Services     ATTENTION: Language assistance services are available, free of charge. Please call [phone].      ATENCIÓN: Si jose maria jesus, tiene a jack disposición servicios gratuitos de asistencia lingüística. Janna al [phone].     CHÚ Ý: N?u b?n nói Ti?ng Vi?t, có các d?ch v? h? tr? ngôn ng? mi?n phí dành cho b?n. G?i s? [phone].        Heart Failure Education       Heart Failure: Being Active  You have a condition called heart failure. Being active doesnt mean that you have to wear yourself out. Even a little movement each day helps to strengthen your heart. If you cant get out to exercise, you can do simple stretching and strengthening exercises at home. These are good ways to keep you well-conditioned and prevent you and your heart from becoming excessively weak.    Ideas to get you started  · Add a little movement to things you do now. Walk to mail letters. Park your car at the far end of the parking lot and walk to the store. Walk up a flight of stairs instead of taking the elevator.  · Choose activities you enjoy. You might walk, swim, or ride an exercise bike. Things like gardening and washing the car count, too. Other possibilities include: washing dishes, walking the dog, walking around the mall, and doing aerobic activities with friends.  · Join a group exercise program at a [facility] or [facility], a senior center, or a community center. Or look into a hospital cardiac rehabilitation program. Ask your doctor if you qualify.  Tips to keep you going  · Get up and get dressed each day. Go to a coffee shop and read a newspaper or go somewhere that you'll be in the presence of other active people. Youll feel more like being active.  · Make a plan. Choose one or more activities that you enjoy and that you can easily do. Then plan to do at least one each day. You might write your  plan on a calendar.  · Go with a friend or a group if you like company. This can help you feel supported and stay motivated, too.  · Plan social events that you enjoy. This will keep you mentally engaged as well as physically motivated to do things you find pleasure in.  For your safety  · Talk with your healthcare provider before starting an exercise program.  · Exercise indoors when its too hot or too cold outside, or when the air quality is poor. Try walking at a shopping mall.  · Wear socks and sturdy shoes to maintain your balance and prevent falls.  · Start slowly. Do a few minutes several times a day at first. Increase your time and speed little by little.  · Stop and rest whenever you feel tired or get short of breath.  · Dont push yourself on days when you dont feel well.  Date Last Reviewed: 3/20/2016  © 2254-0060 [employer]. [address] [place] [address]. All rights reserved. This information is not intended as a substitute for professional medical care. Always follow your healthcare professional's instructions.              Heart Failure: Evaluating Your Heart  You have a condition called heart failure. To evaluate your condition, your doctor will examine you, ask questions, and do some tests. Along with looking for signs of heart failure, the doctor looks for any other health problems that may have led to heart failure. The results of your evaluation will help your doctor form a treatment plan.  Health history and physical exam  Your visit will start with a health history. Tell the doctor about any symptoms youve noticed and about all medicines you take. Then youll have a physical exam. This includes listening to your heartbeat and breathing. Youll also be checked for swelling (edema) in your legs and neck. When you have fluid buildup or fluid in the lungs, it may be called congestive heart failure.  Diagnosing heart failure     During an echocardiogram, sound waves  bounce off the heart. These are converted into a picture on the screen.   The following may be done to help your doctor form a diagnosis:  · X-rays show the size and shape of your heart. These pictures can also show fluid in your lungs.  · An electrocardiogram (ECG or EKG) shows the pattern of your heartbeat. Small pads (electrodes) are placed on your chest, arms, and legs. Wires connect the pads to the ECG machine, which records your hearts electrical signals. This can give the doctor information about heart function.  · An echocardiogram uses ultrasound waves to show the structure and movement of your heart muscle. This shows how well the heart pumps. It also shows the thickness of the heart walls, and if the heart is enlarged. It is one of the most useful, non-invasive tests as it provides information about the heart's general function. This helps your doctor make treatment decisions.  · Lab tests evaluate small amounts of blood or urine for signs of problems. A BNP lab test can help diagnose and evaluate heart failure. BNP stands for B-type natriuretic peptide. The ventricles secrete more BNP when heart failure worsens. Lab tests can also provide information about metabolic dysfunction or heart dysfunction.  Your treatment plan  Based on the results of your evaluation and tests, your doctor will develop a treatment plan. This plan is designed to relieve some of your heart failure symptoms and help make you more comfortable. Your treatment plan may include:  · Medicine to help your heart work better and improve your quality of life  · Changes in what you eat and drink to help prevent fluid from backing up in your body  · Daily monitoring of your weight and heart failure symptoms to see how well your treatment plan is working  · Exercise to help you stay healthy  · Help with quitting smoking  · Emotional and psychological support to help adjust to the changes  · Referrals to other specialists to make sure you are  being treated comprehensively  Date Last Reviewed: 3/21/2016  © 2626-5752 [employer]. [address] [place] [address], [place] [address]. All rights reserved. This information is not intended as a substitute for professional medical care. Always follow your healthcare professional's instructions.              Heart Failure: Making Changes to Your Diet  You have a condition called heart failure. When you have heart failure, excess fluid is more likely to build up in your body because your heart isn't working well. This makes the heart work harder to pump blood. Fluid buildup causes symptoms such as shortness of breath and swelling (edema). This is often referred to as congestive heart failure or CHF. Controlling the amount of salt (sodium) you eat may help stop fluid from building up. Your doctor may also tell you to reduce the amount of fluid you drink.  Reading food labels    Your healthcare provider will tell you how much sodium you can eat each day. Read food labels to keep track. Keep in mind that certain foods are high in salt. These include canned, frozen, and processed foods. Check the amount of sodium in each serving. Watch out for high-sodium ingredients. These include MSG (monosodium glutamate), baking soda, and sodium phosphate.   Eating less salt  Give yourself time to get used to eating less salt. It may take a little while. Here are some tips to help:  · Take the saltshaker off the table. Replace it with salt-free herb mixes and spices.  · Eat fresh or plain frozen vegetables. These have much less salt than canned vegetables.  · Choose low-sodium snacks like sodium-free pretzels, crackers, or air-popped popcorn.  · Dont add salt to your food when youre cooking. Instead, season your foods with pepper, lemon, garlic, or onion.  · When you eat out, ask that your food be cooked without added salt.  · Avoid eating fried foods as these often have a great deal of salt.  If youre told to limit  fluids  You may need to limit how much fluid you have to help prevent swelling. This includes anything that is liquid at room temperature, such as ice cream and soup. If your doctor tells you to limit fluid, try these tips:  · Measure drinks in a measuring cup before you drink them. This will help you meet daily goals.  · Chill drinks to make them more refreshing.  · Suck on frozen lemon wedges to quench thirst.  · Only drink when youre thirsty.  · Chew sugarless gum or suck on hard candy to keep your mouth moist.  · Weigh yourself daily to know if your body's fluid content is rising.  My sodium goal  Your healthcare provider may give you a sodium goal to meet each day. This includes sodium found in food as well as salt that you add. My goal is to eat no more than ___________ mg of sodium per day.     When to call your doctor  Call your doctor right away if you have any symptoms of worsening heart failure. These can include:  · Sudden weight gain  · Increased swelling of your legs or ankles  · Trouble breathing when youre resting or at night  · Increase in the number of pillows you have to sleep on  · Chest pain, pressure, discomfort, or pain in the jaw, neck, or back   Date Last Reviewed: 3/21/2016  © 5499-5795 [employer]. [address] [place] [address], [place] [address]. All rights reserved. This information is not intended as a substitute for professional medical care. Always follow your healthcare professional's instructions.              Heart Failure: Medicines to Help Your Heart    You have a condition called heart failure (also known as congestive heart failure, or CHF). Your doctor will likely prescribe medicines for heart failure and any underlying health problems you have. Most heart failure patients take one or more types of medicinen. Your healthcare provider will work to find the combination of medicines that works best for you.  Heart failure medicines  Here are the most common heart failure  medicines:  · ACE inhibitors lower blood pressure and decrease strain on the heart. This makes it easier for the heart to pump. Angiotensin receptor blockers have similar effects. These are prescribed for some patients instead of ACE inhibitors.  · Beta-blockers relieve stress on the heart. They also improve symptoms. They may also improve the heart's pumping action over time.  · Diuretics (also called water pills) help rid your body of excess water. This can help rid your body of swelling (edema). Having less fluid to pump means your heart doesnt have to work as hard. Some diuretics make your body lose a mineral called potassium. Your doctor will tell you if you need to take supplements or eat more foods high in potassium.  · Digoxin helps your heart pump with more strength. This helps your heart pump more blood with each beat. So, more oxygen-rich blood travels to the rest of the body.  · Aldosterone antagonists help alter hormones and decrease strain on the heart.  · Hydralazine and nitrates are two separate medicines used together to treat heart failure. They may come in one combination pill. They lower blood pressure and decrease how hard the heart has to pump.  Medicines for related conditions  Controlling other heart problems helps keep heart failure under control, too. Depending on other heart problems you have, medicines may be prescribed to:  · Lower blood pressure (antihypertensives).  · Lower cholesterol levels (statins).  · Prevent blood clots (anticoagulants or aspirin).  · Keep the heartbeat steady (antiarrhythmics).  Date Last Reviewed: 3/5/2016  © 8378-9188 [employer]. [address] [place] [address], [place] [address]. All rights reserved. This information is not intended as a substitute for professional medical care. Always follow your healthcare professional's instructions.              Heart Failure: Procedures That May Help    The heart is a muscle that pumps oxygen-rich blood to all  parts of the body. When you have heart failure, the heart is not able to pump as well as it should. Blood and fluid may back up into the lungs (congestive heart failure), and some parts of the body dont get enough oxygen-rich blood to work normally. These problems lead to the symptoms of heart failure.     Certain procedures may help the heart pump better in some cases of heart failure. Some procedures are done to treat health problems that may have caused the heart failure such as coronary artery disease or heart rhythm problems. For more serious heart failure, other options are available.  Treating artery and valve problems  If you have coronary artery disease or valve disease, procedures may be done to improve blood flow. This helps the heart pump better, which can improve heart failure symptoms. First, your doctor may do a cardiac catheterization to help detect clogged blood vessels or valve damage. During this procedure, a  thin tube (catheter) in inserted into a blood vessel and guided to the heart. There a dye is injected and a special type of X-ray (angiogram) is taken of the blood vessels. Procedures to open a blocked artery or fix damaged valves can also be done using catheterization.  · Angioplasty uses a balloon-tipped instrument at the end of the catheter. The balloon is inflated to widen the narrowed artery. In many cases, a stent is expanded to further support the narrowed artery. A stent is a metal mesh tube.  · Valve surgery repairs or replacement of faulty valves can also be done during catheterization so blood can flow properly through the chambers of the heart.  Bypass surgery is another option to help treat blocked arteries. It uses a healthy blood vessel from elsewhere in the body. The healthy blood vessel is attached above and below the blocked area so that blood can flow around the blocked artery.  Treating heart rhythm problems  A device may be placed in the chest to help a weak heart  maintain a healthy, heartbeat so the heart can pump more effectively:  · Pacemaker. A pacemaker is an implanted device that regulates your heartbeat electronically. It monitors your heart's rhythm and generates a painless electric impulse that helps the heart beat in a regular rhythm. A pacemaker is programmed to meet your specific heart rhythm needs.  · Biventricular pacing/cardiac resynchronization therapy. A type of pacemaker that paces both pumping chambers of the heart at the same time to coordinate contractions and to improve the heart's function. Some people with heart failure are candidates for this therapy.  · Implantable cardioverter defibrillator. A device similar to a pacemaker that senses when the heart is beating too fast and delivers an electrical shock to convert the fast rhythm to a normal rhythm. This can be a life saving device.  In severe cases  In more serious cases of heart failure when other treatments no longer work, other options may include:  · Ventricular assist devices (VADs). These are mechanical devices used to take over the pumping function for one or both of the heart's ventricles, or pumping chambers. A VAD may be necessary when heart failure progresses to the point that medicines and other treatments no longer help. In some cases, a VAD may be used as a bridge to transplant.  · Heart transplant. This is replacing the diseased heart with a healthy one from a donor. This is an option for a few people who are very sick. A heart transplant is very serious and not an option for all patients. Your doctor can tell you more.  Date Last Reviewed: 3/20/2016  © 0810-6069 [employer]. [address] [place] [address], [place] [address]. All rights reserved. This information is not intended as a substitute for professional medical care. Always follow your healthcare professional's instructions.              Heart Failure: Tracking Your Weight  You have a condition called heart failure. When  you have heart failure, a sudden weight gain or a steady rise in weight is a warning sign that your body is retaining too much water and salt. This could mean your heart failure is getting worse. If left untreated, it can cause problems for your lungs and result in shortness of breath. Weighing yourself each day is the best way to know if youre retaining water. If your weight goes up quickly, call your doctor. You will be given instructions on how to get rid of the excess water. You will likely need medicines and to avoid salt. This will help your heart work better.  Call your doctor if you gain more than 2 pounds in 1 day, more than 5 pounds in 1 week, or whatever weight gain you were told to report by your doctor. This is often a sign of worsening heart failure and needs to be evaluated and treated. Your doctor will tell you what to do next.   Tips for weighing yourself    · Weigh yourself at the same time each morning, wearing the same clothes. Weigh yourself after urinating and before eating.  · Use the same scale each day. Make sure the numbers are easy to read. Put the scale on a flat, hard surface -- not on a rug or carpet.  · Do not stop weighing yourself. If you forget one day, weigh again the next morning.  How to use your weight chart  · Keep your weight chart near the scale. Write your weight on the chart as soon as you get off the scale.  · Fill in the month and the start date on the chart. Then write down your weight each day. Your chart will look like this:    · If you miss a day, leave the space blank. Weigh yourself the next day and write your weight in the next space.  · Take your weight chart with you when you go to see your doctor.  Date Last Reviewed: 3/20/2016  © 1817-8870 [employer]. [address] [place] [address], [place] [address]. All rights reserved. This information is not intended as a substitute for professional medical care. Always follow your healthcare professional's  instructions.              Heart Failure: Warning Signs of a Flare-Up  You have a condition called heart failure. Once you have heart failure, flare-ups can happen. Below are signs that can mean your heart failure is getting worse. If you notice any of these warning signs, call your healthcare provider.  Swelling    · Your feet, ankles, or lower legs get puffier.  · You notice skin changes on your lower legs.  · Your shoes feel too tight.  · Your clothes are tighter in the waist.  · You have trouble getting rings on or off your fingers.  Shortness of breath  · You have to breathe harder even when youre doing your normal activities or when youre resting.  · You are short of breath walking up stairs or even short distances.  · You wake up at night short of breath or coughing.  · You need to use more pillows or sit up to sleep.  · You wake up tired or restless.  Other warning signs  · You feel weaker, dizzy, or more tired.  · You have chest pain or changes in your heartbeat.  · You have a cough that wont go away.  · You cant remember things or dont feel like eating.  Tracking your weight  Gaining weight is often the first warning sign that heart failure is getting worse. Gaining even a few pounds can be a sign that your body is retaining excess water and salt. Weighing yourself each day in the morning after you urinate and before you eat, is the best way to know if you're retaining water. Get a scale that is easy to read and make sure you wear the same clothes and use the same scale every time you weigh. Your healthcare provider will show you how to track your weight. Call your doctor if you gain more than 2 pounds in 1 day, 5 pounds in 1 week, or whatever weight gain you were told to report by your doctor. This is often a sign of worsening heart failure and needs to be evaluated and treated before it compromises your breathing. Your doctor will tell you what to do next.    Date Last Reviewed: 3/15/2016  ©  0224-6353 [employer]. [address] [place] [address], [place] [address]. All rights reserved. This information is not intended as a substitute for professional medical care. Always follow your healthcare professional's instructions.               [facility] complies with applicable Federal civil rights laws and does not discriminate on the basis of race, color, national origin, age, disability, or sex.

## 2017-05-19 NOTE — ASSESSMENT & PLAN NOTE
CAD involving native coronary artery of native heart without angina pectoris  - Continue ASA, statin, AC-I therapy  - See plan as above

## 2017-05-19 NOTE — ED TRIAGE NOTES
Presents to ER with complaint of pain in her left ear, chest pain and some nausea.  Reports that this is how she felt when she had an MI 3 months ago.

## 2017-05-19 NOTE — CONSULTS
"      Cardiology ER Consult Note  Attending Physician: Gary Huggins MD  Chief Complaint: Neck Pain (Cardiac Bypass 3 months ago. Pt states "I have the same symptoms that I had a that time)    HPI:   52 y.o. woman with history of HTN, HLD, Obesity, hypothyroidism, Ex-smoker,CAD s/p CABG x 3 (on 2/2017 with LIMA->LAD,  SVG->D1,SVG->OM2) presented with bilateral upper chest/neckpain which radiated to her ears since this am which alarmed her to come to ED as she has had similar symptoms pre-CABG admission with normal EKG and negative Trop x 1.     Pt states that she is having similar symptoms to when she had her recent CABG. She endorses constant R inner ear pain radiating down to neck/jaw that began at 5AM this morning, fatigue. No change of pain with breathing, movement of head or jaw. She reports a current mild burning sensation in her chest. She denies SOB. She reports an episode of nausea last night.      ROS:    Constitution: negative for - fever, chills, weight loss or weight gain  HENT: negative for - sore throat, rhinorrhea, or headache  Eyes: negative for - blurred or double vision  Cardiovascular: positive for - chest pain  Pulmonary: no cough, shortness of breath, or wheezing  Gastrointestinal: negative for - abdominal pain, nausea, vomiting, or diarrhea  : negative for - dysuria  Neurological: negative for - focal weakness or sensory changes  PMH:     Past Medical History:   Diagnosis Date    Hypertension     Hypothyroidism     Panic attack     Renal disorder     Kidney stones     Past Surgical History:   Procedure Laterality Date    CHOLECYSTECTOMY      cystoscope      VAGINA SURGERY       Allergies:     Review of patient's allergies indicates:   Allergen Reactions    Prednisone      Medications:     No current facility-administered medications on file prior to encounter.      Current Outpatient Prescriptions on File Prior to Encounter   Medication Sig Dispense Refill    aspirin (ECOTRIN) " 325 MG EC tablet Take 1 tablet (325 mg total) by mouth once daily.  0    atorvastatin (LIPITOR) 80 MG tablet Take 1 tablet by mouth once daily at 6am.  2    ergocalciferol (ERGOCALCIFEROL) 50,000 unit Cap Take 1 capsule (50,000 Units total) by mouth every 7 days. 12 capsule 3    lisinopril (PRINIVIL,ZESTRIL) 20 MG tablet Take 20 mg by mouth once daily.      metoprolol tartrate (LOPRESSOR) 25 MG tablet Take 1 tablet (25 mg total) by mouth 2 (two) times daily. 60 tablet 11    ondansetron (ZOFRAN-ODT) 4 MG TbDL Take 1 tablet by mouth as needed.  0    SYNTHROID 100 mcg tablet Take one tablet daily except on sundays take 1 1/2 pills. 115 tablet 3     RA  Inpatient Medications   Continuous Infusions:  Scheduled Meds:  PRN Meds:     Social History:     Social History   Substance Use Topics    Smoking status: Current Every Day Smoker     Packs/day: 0.50     Types: Cigarettes    Smokeless tobacco: Not on file    Alcohol use No     Family History:   No family history on file.  Physical Exam:     Vitals:  Temp:  [97.5 °F (36.4 °C)]   Pulse:  [70]   Resp:  [18]   BP: (147)/(92)   SpO2:  [100 %]  on RA I/O's:  No intake or output data in the 24 hours ending 05/19/17 1046     Constitutional: NAD, conversant  HEENT: Sclera anicteric, PERRLA, EOMI  Neck: No JVD, no carotid bruits  CV: RRR, no murmur, normal S1/S2 with preserved A2 component  Pulm: CTAB, no wheezes, rales, or ronchi  GI: Abdomen soft, NTND, +BS  Extremities: No LE edema, warm and well perfused  Skin: No ecchymosis, erythema, or ulcers  Psych: AOx3, appropriate affect  Neuro: CNII-XII intact, no focal deficits    Labs:       Recent Labs  Lab 05/19/17  0913      K 4.4      CO2 28   BUN 11   CREATININE 0.9   GLU 97   ANIONGAP 7*       Recent Labs  Lab 05/19/17  0913   TROPONINI <0.006      Recent Labs  Lab 05/19/17  0913   WBC 7.80   HGB 12.0   HCT 38.2          Recent Labs  Lab 05/19/17  0913   AST 19   ALT 16   ALKPHOS 131   BILITOT 0.3  "  ALBUMIN 3.6        Imaging:     EF   Date Value Ref Range Status   02/18/2017 60 55 - 65          EKG: normal sinus rhythm, no blocks or conduction defects, no ischemic changes    Assessment/Plan:   53 yo F with PMHx HTN, HLD, Obesity, hypothyroidism, Ex-smoker,CAD s/p CABG x 3 (on 2/2017 with LIMA->LAD,  SVG->D1,SVG->OM2) with exertional chest pain/neck pain    1- Atypical Chest pain/neck pain/ear pain -   ACS vs non cardiac etiology  2- Obesity  3- CAD s/p CABG  4- Ex smoker    Consider patient reports that these symptoms are "similar" to what she had at before NSTEMI and CABG in Feb, 2017 will recommend admission for overnight observation and Trop x 2 q 4-6 hrs  Continue with ASA/statin/AC-I therapy. If trops x 3 negative - patient can be discharged with outpatient PET stress to rule out any graft occlusion and ENT follow up. If trop becomes positive - then she would need interventional cardiology consult for [facility].    Case discussed with Staff Physician Dr. Valderrama. Thank you for consulting us. Will sign off for now.         Signed    Beau Barrow MD  Cardiology Fellow, PGY-6  [employer] [id number]  5/19/2017 10:46 AM  "

## 2017-05-19 NOTE — PROVIDER PROGRESS NOTES - EMERGENCY DEPT.
Encounter Date: 5/19/2017    ED Physician Progress Notes       SCRIBE NOTE: I, Jeanette Figueroa, am scribing for, and in the presence of,  Dr. Andrews.  Physician Statement: I, Dr. Andrews, personally performed the services described in this documentation as scribed by Jeanette Figueroa in my presence, and it is both accurate and complete.      EKG - STEMI Decision  Initial Reading: No STEMI present.

## 2017-05-19 NOTE — NURSING
Received report from Chantale ACUÑA in ER. Pt arrived to OBS unit via w/c. Pt accompanied by transport associate and spouse. Pt denies chest or neck pain. C/o pain bilat ears 1/10. Pt oriented to room. Bed placed to lowest level, wheels locked. Call light placed within pt reach.

## 2017-05-19 NOTE — ASSESSMENT & PLAN NOTE
"- Presented with symptoms "similar" to prior presentation in 2/2017 during which admission she had CABG x3  - Initial troponin negative, EKG with NSR, no evidence of ischemic changes  - Cardiology consulted in the ED, recommend admission for overnight observation and troponin x2 q 4-6 hours, continue ASA/statin/AC-I therapy. If troponin negative x3, patient stable for discharge with outpatient follow up for PET stress to rule out graft occlusion.  - ENT follow up if ear/neck pain continues  - If troponin becomes positive, will need interventional cardiology consult for [facility]    "

## 2017-05-19 NOTE — H&P
"[facility] Hospital Medicine  History & Physical    Patient Name: Jolynn Mancia  MRN: [id number]  Admission Date: 5/19/2017  Attending Physician: Bassam Lugo MD   Primary Care Provider: Markie Kelly MD    [facility] Medicine Team: [facility] E Kesha Joyce PA-C     Patient information was obtained from patient and ER records.     Subjective:     Principal Problem:Chest pain    Chief Complaint:   Chief Complaint   Patient presents with    Neck Pain     Cardiac Bypass 3 months ago. Pt states "I have the same symptoms that I had a that time. Ear pain that goes to my neck and I feel like I have a cold."         HPI: 51 yo female with history of HTN, hypothyroidism, s/p CABG 2/15/17 presented to ED today for evaluation of bilateral jaw and ear pain that began this morning around 5am. She rates the pain as 1/10 today, but presented to the hospital as she reports experiencing similar symptoms on her hospital presentation in February at which point it was determined she needed the CABG procedure. She reports pain radiates into her neck and jaw and is constant. She endorses an associated headache, rhinorrhea, and one episode of nausea but denies fevers, chills, abdominal pain, vomiting, lower extremity edema, numbness/tingling in extremities.    In the ED, initial troponin negative, EKG with NSR, CBC/CMP grossly unremarkable, CXR with no acute abnormality. Admitted to observation for further management.      Past Medical History:   Diagnosis Date    Coronary artery disease     Hypertension     Hypothyroidism     Panic attack     Renal disorder     Kidney stones       Past Surgical History:   Procedure Laterality Date    CARDIAC SURGERY      CHOLECYSTECTOMY      cystoscope      VAGINA SURGERY         Review of patient's allergies indicates:   Allergen Reactions    Prednisone        No current facility-administered medications on file prior to encounter.      Current Outpatient " Prescriptions on File Prior to Encounter   Medication Sig    aspirin (ECOTRIN) 325 MG EC tablet Take 1 tablet (325 mg total) by mouth once daily.    atorvastatin (LIPITOR) 80 MG tablet Take 1 tablet by mouth once daily at 6am.    ergocalciferol (ERGOCALCIFEROL) 50,000 unit Cap Take 1 capsule (50,000 Units total) by mouth every 7 days.    lisinopril (PRINIVIL,ZESTRIL) 20 MG tablet Take 20 mg by mouth once daily.    metoprolol tartrate (LOPRESSOR) 25 MG tablet Take 1 tablet (25 mg total) by mouth 2 (two) times daily.    ondansetron (ZOFRAN-ODT) 4 MG TbDL Take 1 tablet by mouth as needed.    SYNTHROID 100 mcg tablet Take one tablet daily except on sundays take 1 1/2 pills.     Family History     None        Social History Main Topics    Smoking status: Current Every Day Smoker     Packs/day: 0.50     Types: Cigarettes    Smokeless tobacco: Not on file    Alcohol use No    Drug use: No    Sexual activity: Not on file     Review of Systems   Constitutional: Negative for appetite change, chills, fatigue and fever.   HENT: Positive for ear pain and rhinorrhea. Negative for congestion and sore throat.    Respiratory: Positive for cough. Negative for shortness of breath and wheezing.   Cardiovascular: Negative for chest pain, palpitations and leg swelling.   Gastrointestinal: Positive for nausea. Negative for abdominal pain, constipation, diarrhea and vomiting.   Genitourinary: Negative for dysuria and frequency.   Musculoskeletal: Positive for neck pain. Negative for arthralgias, gait problem and neck stiffness.   Skin: Negative for pallor, rash and wound.   Neurological: Positive for headaches. Negative for dizziness, speech difficulty, weakness and numbness.   Psychiatric/Behavioral: Negative for agitation, confusion and hallucinations.     Objective:     Vital Signs (Most Recent):  Temp: 97.9 °F (36.6 °C) (05/19/17 1228)  Pulse: (!) 55 (05/19/17 1228)  Resp: 18 (05/19/17 1228)  BP: (!) 113/59 (05/19/17  1228)  SpO2: 98 % (05/19/17 1228) Vital Signs (24h Range):  Temp:  [97.5 °F (36.4 °C)-98.5 °F (36.9 °C)] 97.9 °F (36.6 °C)  Pulse:  [55-70] 55  Resp:  [18] 18  SpO2:  [98 %-100 %] 98 %  BP: (110-147)/(59-92) 113/59     Weight: 105.8 kg (233 lb 4 oz)  Body mass index is 40.04 kg/(m^2).    Physical Exam   Constitutional: She is oriented to person, place, and time. She appears well-developed and well-nourished. No distress.   HENT:   Head: Normocephalic and atraumatic.   Right Ear: Tympanic membrane and external ear normal. No drainage.   Left Ear: Tympanic membrane and external ear normal. No drainage.   Neck: Normal range of motion. Neck supple. No JVD present.   Cardiovascular: Normal rate, regular rhythm, normal heart sounds and intact distal pulses.  Exam reveals no friction rub.    No murmur heard.  Pulmonary/Chest: Effort normal and breath sounds normal. No respiratory distress. She has no wheezes. She has no rales.   Abdominal: Soft. Bowel sounds are normal. She exhibits no distension. There is no tenderness. There is no rebound and no guarding.   Musculoskeletal: Normal range of motion. She exhibits no edema or deformity.   Lymphadenopathy:     She has no cervical adenopathy.   Neurological: She is alert and oriented to person, place, and time. No cranial nerve deficit.   Skin: Skin is warm and dry. She is not diaphoretic. No erythema. No pallor.        Significant Labs:   CBC:   Recent Labs  Lab 05/19/17  0913   WBC 7.80   HGB 12.0   HCT 38.2        CMP:   Recent Labs  Lab 05/19/17  0913      K 4.4      CO2 28   GLU 97   BUN 11   CREATININE 0.9   CALCIUM 9.4   PROT 7.2   ALBUMIN 3.6   BILITOT 0.3   ALKPHOS 131   AST 19   ALT 16   ANIONGAP 7*   EGFRNONAA >60.0     Troponin:   Recent Labs  Lab 05/19/17  0913   TROPONINI <0.006     All pertinent labs within the past 24 hours have been reviewed.    Significant Imaging: I have reviewed all pertinent imaging results/findings within the past 24  "hours.    Assessment/Plan:     * Chest pain  - Presented with symptoms "similar" to prior presentation in 2/2017 during which admission she had CABG x3  - Initial troponin negative, EKG with NSR, no evidence of ischemic changes  - Cardiology consulted in the ED, recommend admission for overnight observation and troponin x2 q 4-6 hours, continue ASA/statin/AC-I therapy. If troponin negative x3, patient stable for discharge with outpatient follow up for PET stress to rule out graft occlusion.  - ENT follow up if ear/neck pain continues  - If troponin becomes positive, will need interventional cardiology consult for [facility]      HTN (hypertension), benign  - Continue lisinopril, metoprolol      Hypothyroidism due to acquired atrophy of thyroid  - Continue synthroid      S/P CABG x 3  CAD involving native coronary artery of native heart without angina pectoris  - Continue ASA, statin, AC-I therapy  - See plan as above    VTE Risk Mitigation         Ordered     Place sequential compression device  Until discontinued      05/19/17 1322     Place ERNESTO hose  Until discontinued      05/19/17 1322     Medium Risk of VTE  Once      05/19/17 1056        Kesha Joyce PA-C  Department of Hospital Medicine   [facility]  Staff: Dr. Lugo  "

## 2017-05-19 NOTE — SUBJECTIVE & OBJECTIVE
Past Medical History:   Diagnosis Date    Coronary artery disease     Hypertension     Hypothyroidism     Panic attack     Renal disorder     Kidney stones       Past Surgical History:   Procedure Laterality Date    CARDIAC SURGERY      CHOLECYSTECTOMY      cystoscope      VAGINA SURGERY         Review of patient's allergies indicates:   Allergen Reactions    Prednisone        No current facility-administered medications on file prior to encounter.      Current Outpatient Prescriptions on File Prior to Encounter   Medication Sig    aspirin (ECOTRIN) 325 MG EC tablet Take 1 tablet (325 mg total) by mouth once daily.    atorvastatin (LIPITOR) 80 MG tablet Take 1 tablet by mouth once daily at 6am.    ergocalciferol (ERGOCALCIFEROL) 50,000 unit Cap Take 1 capsule (50,000 Units total) by mouth every 7 days.    lisinopril (PRINIVIL,ZESTRIL) 20 MG tablet Take 20 mg by mouth once daily.    metoprolol tartrate (LOPRESSOR) 25 MG tablet Take 1 tablet (25 mg total) by mouth 2 (two) times daily.    ondansetron (ZOFRAN-ODT) 4 MG TbDL Take 1 tablet by mouth as needed.    SYNTHROID 100 mcg tablet Take one tablet daily except on sundays take 1 1/2 pills.     Family History     None        Social History Main Topics    Smoking status: Current Every Day Smoker     Packs/day: 0.50     Types: Cigarettes    Smokeless tobacco: Not on file    Alcohol use No    Drug use: No    Sexual activity: Not on file     Review of Systems   Constitutional: Negative for appetite change, chills, fatigue and fever.   HENT: Positive for ear pain and rhinorrhea. Negative for congestion and sore throat.    Respiratory: Positive for cough. Negative for shortness of breath and wheezing.    Cardiovascular: Negative for chest pain, palpitations and leg swelling.   Gastrointestinal: Positive for nausea. Negative for abdominal pain, constipation, diarrhea and vomiting.   Genitourinary: Negative for dysuria and frequency.   Musculoskeletal:  Positive for neck pain. Negative for arthralgias, gait problem and neck stiffness.   Skin: Negative for pallor, rash and wound.   Neurological: Positive for headaches. Negative for dizziness, speech difficulty, weakness and numbness.   Psychiatric/Behavioral: Negative for agitation, confusion and hallucinations.     Objective:     Vital Signs (Most Recent):  Temp: 97.9 °F (36.6 °C) (05/19/17 1228)  Pulse: (!) 55 (05/19/17 1228)  Resp: 18 (05/19/17 1228)  BP: (!) 113/59 (05/19/17 1228)  SpO2: 98 % (05/19/17 1228) Vital Signs (24h Range):  Temp:  [97.5 °F (36.4 °C)-98.5 °F (36.9 °C)] 97.9 °F (36.6 °C)  Pulse:  [55-70] 55  Resp:  [18] 18  SpO2:  [98 %-100 %] 98 %  BP: (110-147)/(59-92) 113/59     Weight: 105.8 kg (233 lb 4 oz)  Body mass index is 40.04 kg/(m^2).    Physical Exam   Constitutional: She is oriented to person, place, and time. She appears well-developed and well-nourished. No distress.   HENT:   Head: Normocephalic and atraumatic.   Right Ear: Tympanic membrane and external ear normal. No drainage.   Left Ear: Tympanic membrane and external ear normal. No drainage.   Neck: Normal range of motion. Neck supple. No JVD present.   Cardiovascular: Normal rate, regular rhythm, normal heart sounds and intact distal pulses.  Exam reveals no friction rub.    No murmur heard.  Pulmonary/Chest: Effort normal and breath sounds normal. No respiratory distress. She has no wheezes. She has no rales.   Abdominal: Soft. Bowel sounds are normal. She exhibits no distension. There is no tenderness. There is no rebound and no guarding.   Musculoskeletal: Normal range of motion. She exhibits no edema or deformity.   Lymphadenopathy:     She has no cervical adenopathy.   Neurological: She is alert and oriented to person, place, and time. No cranial nerve deficit.   Skin: Skin is warm and dry. She is not diaphoretic. No erythema. No pallor.        Significant Labs:   CBC:   Recent Labs  Lab 05/19/17  0913   WBC 7.80   HGB 12.0    HCT 38.2        CMP:   Recent Labs  Lab 05/19/17  0913      K 4.4      CO2 28   GLU 97   BUN 11   CREATININE 0.9   CALCIUM 9.4   PROT 7.2   ALBUMIN 3.6   BILITOT 0.3   ALKPHOS 131   AST 19   ALT 16   ANIONGAP 7*   EGFRNONAA >60.0     Troponin:   Recent Labs  Lab 05/19/17  0913   TROPONINI <0.006     All pertinent labs within the past 24 hours have been reviewed.    Significant Imaging: I have reviewed all pertinent imaging results/findings within the past 24 hours.

## 2017-05-19 NOTE — ED PROVIDER NOTES
"Encounter Date: 2017    SCRIBE #1 NOTE: I, Noemy Felipe, am scribing for, and in the presence of, Dr. Huggins.       History     Chief Complaint   Patient presents with    Neck Pain     Cardiac Bypass 3 months ago. Pt states "I have the same symptoms that I had a that time. Ear pain that goes to my neck and I feel like I have a cold."      Review of patient's allergies indicates:   Allergen Reactions    Prednisone      HPI Comments: Time patient was seen by the provider: 9:16 AM      The patient is a 52 y.o. female with hx of: CABG x3 in 2017, HTN, kidney stones, hypothyroidism that presents to the ED with a complaint of neck pain. Pt states that she is having similar symptoms to when she had her recent CABG. She endorses constant R inner ear pain radiating down to neck/jaw that began at 5AM this morning, fatigue. No change of pain with breathing, movement of head or jaw. She reports a current mild burning sensation in her chest. She denies SOB. She reports an episode of nausea last night. She endorses intermittent stabbing CP and bilateral arm numbness. Pt went to a  and graduation yesterday. She quit smoking 3 months ago.      The history is provided by the patient.     Past Medical History:   Diagnosis Date    Hypertension     Hypothyroidism     Panic attack     Renal disorder     Kidney stones     Past Surgical History:   Procedure Laterality Date    CHOLECYSTECTOMY      cystoscope      VAGINA SURGERY       No family history on file.  Social History   Substance Use Topics    Smoking status: Current Every Day Smoker     Packs/day: 0.50     Types: Cigarettes    Smokeless tobacco: Not on file    Alcohol use No     Review of Systems   Constitutional: Positive for fatigue. Negative for fever.   HENT: Positive for ear pain.    Eyes: Negative for visual disturbance.   Respiratory: Negative for shortness of breath.    Cardiovascular:        Burning sensation in her chest   Gastrointestinal: " Positive for nausea.   Genitourinary: Negative for hematuria.   Musculoskeletal: Positive for neck pain.   Skin: Negative for rash.   Neurological: Positive for numbness.       Physical Exam   Initial Vitals   BP Pulse Resp Temp SpO2   05/19/17 0846 05/19/17 0846 05/19/17 0846 05/19/17 0846 05/19/17 0846   147/92 70 18 97.5 °F (36.4 °C) 100 %     Physical Exam    Nursing note and vitals reviewed.  Constitutional: She appears well-developed and well-nourished. She is not diaphoretic. No distress.   HENT:   Head: Normocephalic and atraumatic.   Mouth/Throat: Oropharynx is clear and moist.   Eyes: Conjunctivae and EOM are normal. Pupils are equal, round, and reactive to light.   Neck: Normal range of motion. Neck supple. No JVD present.   Cardiovascular: Normal rate, regular rhythm and normal heart sounds.   No murmur heard.  Pulmonary/Chest: Breath sounds normal. No respiratory distress. She has no wheezes. She has no rhonchi. She has no rales.   Abdominal: Soft. Bowel sounds are normal. She exhibits no distension and no mass. There is no tenderness. There is no rebound and no guarding.   Musculoskeletal: Normal range of motion. She exhibits no edema or tenderness.   Neurological: She is alert and oriented to person, place, and time. She has normal strength. No cranial nerve deficit or sensory deficit.   Skin: Skin is warm and dry. No rash noted.   Psychiatric: She has a normal mood and affect.         ED Course   Procedures  Labs Reviewed   CBC W/ AUTO DIFFERENTIAL - Abnormal; Notable for the following:        Result Value    MCHC 31.4 (*)     All other components within normal limits   COMPREHENSIVE METABOLIC PANEL - Abnormal; Notable for the following:     Anion Gap 7 (*)     All other components within normal limits   TROPONIN I   PROTIME-INR     EKG Readings: (Independently Interpreted)   NSR, 66. No ischemic changes.       X-Rays:   Independently Interpreted Readings:   Chest X-Ray: Normal heart size.  No  infiltrates.  No acute abnormalities.     Medical Decision Making:   History:   Old Medical Records: I decided to obtain old medical records.  Old Records Summarized: other records.       <> Summary of Records: Pt had CABG in February 2017. She has been seen in the ED 3 weeks ago with SOB and had - d-dimer and evaluation at that point. She has not seen cardiology since.  Initial Assessment:   Unclear if unstable angina, stable angina, or atypical symptoms. Highly doubt PE, dissection, ear infection, or neurologic issue. Will discuss with cardiology after cardiac w/u.  Independently Interpreted Test(s):   I have ordered and independently interpreted X-rays - see prior notes.  I have ordered and independently interpreted EKG Reading(s) - see prior notes  Clinical Tests:   Lab Tests: Reviewed and Ordered  Radiological Study: Reviewed and Ordered  Medical Tests: Reviewed and Ordered  ED Management:  10:54 AM  Reviewed labs, normal troponin. CXR normal. Discussed with cardiology and they recommend observation. Will discuss with IM.    10:55 AM  Discussed with IM, pt will be placed in observation.  Other:   I have discussed this case with another health care provider.            Scribe Attestation:   Scribe #1: I performed the above scribed service and the documentation accurately describes the services I performed. I attest to the accuracy of the note.    Attending Attestation:           Physician Attestation for Scribe:  Physician Attestation Statement for Scribe #1: I, Dr. Huggins, reviewed documentation, as scribed by Noemy Wang in my presence, and it is both accurate and complete.                 ED Course     Clinical Impression:   The primary encounter diagnosis was Chest pain, unspecified type. A diagnosis of Chest pain was also pertinent to this visit.    Disposition:   Disposition: Placed in Observation       Gary Huggins MD  05/24/17 0732

## 2017-05-20 VITALS
HEART RATE: 91 BPM | WEIGHT: 233.25 LBS | BODY MASS INDEX: 39.82 KG/M2 | OXYGEN SATURATION: 99 % | SYSTOLIC BLOOD PRESSURE: 143 MMHG | TEMPERATURE: 97 F | DIASTOLIC BLOOD PRESSURE: 80 MMHG | RESPIRATION RATE: 20 BRPM | HEIGHT: 64 IN

## 2017-05-20 LAB
ALBUMIN SERPL BCP-MCNC: 3.5 G/DL
ALP SERPL-CCNC: 123 U/L
ALT SERPL W/O P-5'-P-CCNC: 15 U/L
ANION GAP SERPL CALC-SCNC: 9 MMOL/L
AST SERPL-CCNC: 18 U/L
BASOPHILS # BLD AUTO: 0.04 K/UL
BASOPHILS NFR BLD: 0.6 %
BILIRUB SERPL-MCNC: 0.5 MG/DL
BUN SERPL-MCNC: 13 MG/DL
CALCIUM SERPL-MCNC: 9.3 MG/DL
CHLORIDE SERPL-SCNC: 108 MMOL/L
CO2 SERPL-SCNC: 25 MMOL/L
CREAT SERPL-MCNC: 0.8 MG/DL
DIFFERENTIAL METHOD: ABNORMAL
EOSINOPHIL # BLD AUTO: 0.3 K/UL
EOSINOPHIL NFR BLD: 4.1 %
ERYTHROCYTE [DISTWIDTH] IN BLOOD BY AUTOMATED COUNT: 13.5 %
EST. GFR  (AFRICAN AMERICAN): >60 ML/MIN/1.73 M^2
EST. GFR  (NON AFRICAN AMERICAN): >60 ML/MIN/1.73 M^2
GLUCOSE SERPL-MCNC: 93 MG/DL
HCT VFR BLD AUTO: 40 %
HGB BLD-MCNC: 12.7 G/DL
LYMPHOCYTES # BLD AUTO: 1.3 K/UL
LYMPHOCYTES NFR BLD: 19.1 %
MCH RBC QN AUTO: 29.1 PG
MCHC RBC AUTO-ENTMCNC: 31.8 %
MCV RBC AUTO: 92 FL
MONOCYTES # BLD AUTO: 0.4 K/UL
MONOCYTES NFR BLD: 6 %
NEUTROPHILS # BLD AUTO: 4.9 K/UL
NEUTROPHILS NFR BLD: 69.8 %
PLATELET # BLD AUTO: 250 K/UL
PMV BLD AUTO: 12.1 FL
POTASSIUM SERPL-SCNC: 4.2 MMOL/L
PROT SERPL-MCNC: 6.9 G/DL
RBC # BLD AUTO: 4.36 M/UL
SODIUM SERPL-SCNC: 142 MMOL/L
WBC # BLD AUTO: 7 K/UL

## 2017-05-20 PROCEDURE — 25000003 PHARM REV CODE 250: Performed by: PHYSICIAN ASSISTANT

## 2017-05-20 PROCEDURE — 85025 COMPLETE CBC W/AUTO DIFF WBC: CPT

## 2017-05-20 PROCEDURE — 99217 PR OBSERVATION CARE DISCHARGE: CPT | Mod: ,,, | Performed by: PHYSICIAN ASSISTANT

## 2017-05-20 PROCEDURE — 36415 COLL VENOUS BLD VENIPUNCTURE: CPT

## 2017-05-20 PROCEDURE — G0378 HOSPITAL OBSERVATION PER HR: HCPCS

## 2017-05-20 PROCEDURE — 80053 COMPREHEN METABOLIC PANEL: CPT

## 2017-05-20 RX ADMIN — METOPROLOL TARTRATE 25 MG: 25 TABLET ORAL at 08:05

## 2017-05-20 RX ADMIN — REGULAR STRENGTH 325 MG: 325 TABLET ORAL at 08:05

## 2017-05-20 RX ADMIN — LISINOPRIL 20 MG: 20 TABLET ORAL at 08:05

## 2017-05-20 RX ADMIN — LEVOTHYROXINE SODIUM 100 MCG: 100 TABLET ORAL at 06:05

## 2017-05-20 NOTE — DISCHARGE SUMMARY
[facility] Hospital Medicine  Discharge Summary      Patient Name: Jolynn Mancia  MRN: [id number]  Admission Date: 5/19/2017  Hospital Length of Stay: 0 days  Discharge Date and Time:  05/20/2017 10:51 AM  Attending Physician: No att. providers found   Discharging Provider: Kesha Joyce PA-C  Primary Care Provider: Markie Kelly MD  [facility] Medicine Team: [facility] [place] Kesha Joyce PA-C    HPI:   51 yo female with history of HTN, hypothyroidism, s/p CABG 2/15/17 presented to ED today for evaluation of bilateral jaw and ear pain that began this morning around 5am. She rates the pain as 1/10 today, but presented to the hospital as she reports experiencing similar symptoms on her hospital presentation in February at which point it was determined she needed the CABG procedure. She reports pain radiates into her neck and jaw and is constant. She endorses an associated headache, rhinorrhea, and one episode of nausea but denies fevers, chills, abdominal pain, vomiting, lower extremity edema, numbness/tingling in extremities.    In the ED, initial troponin negative, EKG with NSR, CBC/CMP grossly unremarkable, CXR with no acute abnormality. Admitted to observation for further management.      * No surgery found *      Indwelling Lines/Drains at time of discharge:   Lines/Drains/Airways          No matching active lines, drains, or airways        Hospital Course:   51 yo female admitted to observation for further evaluation and treatment of chest/neck/ear pain, similar to prior presentation 3 months ago when she had a CABG. In the ED, initial troponin negative, EKG with NSR, CBC/CMP grossly unremarkable, CXR with no acute abnormality. Cardiology consulted, recommended trending troponin, continuing current medical therapy, outpatient follow up for PET stress if troponins remain negative. Troponins negative x3. Monitored on telemetry with no events. Patient reports resolution of chest,  "neck, and ear pain today, requesting discharge home. Stable for discharge with outpatient cardiology and ENT follow up.     Consults:   Consults         Status Ordering Provider     Inpatient consult to Cardiology  Once     Provider:  (Not yet assigned)    Completed ROSALIA ROLAND          Significant Diagnostic Studies: Labs:   Troponin   Recent Labs  Lab 05/19/17 2022   TROPONINI <0.006         Pending Diagnostic Studies:     None        Final Active Diagnoses:    Diagnosis Date Noted POA    PRINCIPAL PROBLEM:  Chest pain [R07.9] 05/19/2017 Yes    HTN (hypertension), benign [I10] 02/15/2017 Yes    Hypothyroidism due to acquired atrophy of thyroid [E03.4]  Yes    Coronary artery disease involving native coronary artery of native heart without angina pectoris [I25.10] 02/22/2017 Yes    S/P CABG x 3 [Z95.1] 02/21/2017 Not Applicable      Problems Resolved During this Admission:    Diagnosis Date Noted Date Resolved POA      * Chest pain  - Presented with symptoms "similar" to prior presentation in 2/2017 during which admission she had CABG x3  - Initial troponin negative, EKG with NSR, no evidence of ischemic changes  - Cardiology consulted in the ED, recommend admission for overnight observation and troponin x2 q 4-6 hours, continue ASA/statin/AC-I therapy. If troponin negative x3, patient stable for discharge with outpatient follow up for PET stress to rule out graft occlusion.  - Referral placed to ENT for follow up of ear/neck pain  - Troponin negative x3, no events on telemetry overnight. Patient stable for discharge home today with outpatient cardiology and ENT follow up.       HTN (hypertension), benign  - Continue lisinopril, metoprolol      Hypothyroidism due to acquired atrophy of thyroid  - Continue synthroid      S/P CABG x 3  CAD involving native coronary artery of native heart without angina pectoris  - Continue ASA, statin, AC-I therapy  - See plan as above      Discharged Condition: " good    Disposition: Home or Self Care    Follow Up:  Follow-up Information     Follow up with [facility] P[facility] - Cardiology.    Specialty:  Cardiology    Why:  Please follow up with cardiology for outpatient PET stress test.    Contact information:    [address]  [facility] [address] [phone]    Additional information:    3rd floor        Follow up with Corey Pineda - Otorhinolaryngology.    Specialty:  Otolaryngology    Why:  Please follow up with ENT, the office will call you to schedule appointment.     Contact information:    [address]  [facility] [address] [phone]    Additional information:    [facility] - 4th Floor        Patient Instructions:     Ambulatory Referral to Cardiology   Referral Priority: Routine Referral Type: Consultation   Referral Reason: Specialty Services Required    Requested Specialty: Cardiology    Number of Visits Requested: 1      Ambulatory Referral to ENT   Referral Priority: Routine Referral Type: Consultation   Referral Reason: Specialty Services Required    Requested Specialty: Otolaryngology    Number of Visits Requested: 1      Diet Cardiac     Activity as tolerated     Call MD for:  severe uncontrolled pain     Call MD for:  persistent dizziness, light-headedness, or visual disturbances     Call MD for:  difficulty breathing or increased cough       Medications:  Reconciled Home Medications:   Discharge Medication List as of 5/20/2017 10:08 AM      CONTINUE these medications which have NOT CHANGED    Details   aspirin (ECOTRIN) 325 MG EC tablet Take 1 tablet (325 mg total) by mouth once daily., Starting 2/24/2017, Until Sat 2/24/18, OTC      atorvastatin (LIPITOR) 80 MG tablet Take 1 tablet by mouth once daily at 6am., Starting 3/6/2017, Until Discontinued, Historical Med      ergocalciferol (ERGOCALCIFEROL) 50,000 unit Cap Take 1 capsule (50,000 Units total) by mouth every 7 days., Starting 5/9/2017, Until Discontinued, Normal       lisinopril (PRINIVIL,ZESTRIL) 20 MG tablet Take 20 mg by mouth once daily., Until Discontinued, Historical Med      metoprolol tartrate (LOPRESSOR) 25 MG tablet Take 1 tablet (25 mg total) by mouth 2 (two) times daily., Starting 2/24/2017, Until Sat 2/24/18, Normal      ondansetron (ZOFRAN-ODT) 4 MG TbDL Take 1 tablet by mouth as needed., Starting 3/27/2017, Until Discontinued, Historical Med      SYNTHROID 100 mcg tablet Take one tablet daily except on sundays take 1 1/2 pills., Normal           Time spent on the discharge of patient: 25 minutes    Kesha Joyce PA-C  Department of Hospital Medicine  Staff: Dr. Kearneyma [facility]

## 2017-05-20 NOTE — ASSESSMENT & PLAN NOTE
"- Presented with symptoms "similar" to prior presentation in 2/2017 during which admission she had CABG x3  - Initial troponin negative, EKG with NSR, no evidence of ischemic changes  - Cardiology consulted in the ED, recommend admission for overnight observation and troponin x2 q 4-6 hours, continue ASA/statin/AC-I therapy. If troponin negative x3, patient stable for discharge with outpatient follow up for PET stress to rule out graft occlusion.  - Referral placed to ENT for follow up of ear/neck pain  - Troponin negative x3, no events on telemetry overnight. Patient stable for discharge home today with outpatient cardiology and ENT follow up.     "

## 2017-05-20 NOTE — PLAN OF CARE
Problem: Patient Care Overview  Goal: Plan of Care Review  Outcome: Outcome(s) achieved Date Met:  05/20/17  Fall precaution maintained thru out hospital stay; pt denies any pain or discomfort; no sign of distress noted; heart monitored.

## 2017-05-20 NOTE — NURSING
Pt d/c to home per md.  Discharged instructions given to patient and spouse; both verbalized understanding; pt denies any pain or discomfort; no sign of distress noted; iv d/c per protocol

## 2017-05-20 NOTE — PLAN OF CARE
Problem: Patient Care Overview  Goal: Plan of Care Review  Outcome: Ongoing (interventions implemented as appropriate)  Pt progressing towards goals.denies chest pain.remains on telemetry,NSR.all troponin levels negative.safety precautions maintained.pt free of falls or injuries.plan for dc in the am.continue plan of care.

## 2017-05-25 ENCOUNTER — OFFICE VISIT (OUTPATIENT)
Dept: CARDIOLOGY | Facility: CLINIC | Age: 52
End: 2017-05-25
Payer: COMMERCIAL

## 2017-05-25 VITALS
SYSTOLIC BLOOD PRESSURE: 113 MMHG | BODY MASS INDEX: 39.6 KG/M2 | HEIGHT: 64 IN | WEIGHT: 231.94 LBS | DIASTOLIC BLOOD PRESSURE: 72 MMHG | OXYGEN SATURATION: 98 % | HEART RATE: 70 BPM

## 2017-05-25 DIAGNOSIS — Z95.1 S/P CABG X 3: ICD-10-CM

## 2017-05-25 DIAGNOSIS — I25.10 CORONARY ARTERY DISEASE INVOLVING NATIVE CORONARY ARTERY OF NATIVE HEART WITHOUT ANGINA PECTORIS: ICD-10-CM

## 2017-05-25 DIAGNOSIS — R07.9 CHEST PAIN, UNSPECIFIED TYPE: Primary | ICD-10-CM

## 2017-05-25 DIAGNOSIS — I10 HTN (HYPERTENSION), BENIGN: ICD-10-CM

## 2017-05-25 PROCEDURE — 99214 OFFICE O/P EST MOD 30 MIN: CPT | Mod: S$GLB,,, | Performed by: INTERNAL MEDICINE

## 2017-05-25 PROCEDURE — 99999 PR PBB SHADOW E&M-EST. PATIENT-LVL III: CPT | Mod: PBBFAC,,, | Performed by: INTERNAL MEDICINE

## 2017-05-25 RX ORDER — NITROGLYCERIN 0.4 MG/1
0.4 TABLET SUBLINGUAL EVERY 5 MIN PRN
Qty: 25 TABLET | Refills: 11 | Status: SHIPPED | OUTPATIENT
Start: 2017-05-25 | End: 2018-05-25

## 2017-05-25 RX ORDER — ISOSORBIDE MONONITRATE 30 MG/1
30 TABLET, EXTENDED RELEASE ORAL DAILY
Qty: 30 TABLET | Refills: 11 | Status: SHIPPED | OUTPATIENT
Start: 2017-05-25 | End: 2018-12-06

## 2017-05-25 NOTE — PROGRESS NOTES
Subjective:    Patient ID:  Jolynn Mancia is a 52 y.o. female who presents for follow-up of Coronary Artery Disease      HPI   Previously followed at [facility]  HTN, hypothyroidism, s/p CABG 2/20/17  - LIMA to LAD, SVG to D1, SVG to OM2    Recently admitted  51 yo female with history of HTN, hypothyroidism, s/p CABG 2/15/17 presented to ED today for evaluation of bilateral jaw and ear pain that began this morning around 5am. She rates the pain as 1/10 today, but presented to the hospital as she reports experiencing similar symptoms on her hospital presentation in February at which point it was determined she needed the CABG procedure. She reports pain radiates into her neck and jaw and is constant. She endorses an associated headache, rhinorrhea, and one episode of nausea but denies fevers, chills, abdominal pain, vomiting, lower extremity edema, numbness/tingling in extremities.    51 yo female admitted to observation for further evaluation and treatment of chest/neck/ear pain, similar to prior presentation 3 months ago when she had a CABG. In the ED, initial troponin negative, EKG with NSR, CBC/CMP grossly unremarkable, CXR with no acute abnormality. Cardiology consulted, recommended trending troponin, continuing current medical therapy, outpatient follow up for PET stress if troponins remain negative. Troponins negative x3. Monitored on telemetry with no events. Patient reports resolution of chest, neck, and ear pain today, requesting discharge home. Stable for discharge with outpatient cardiology and ENT follow up.     Echo 2/18/17    1 - Normal left ventricular systolic function (EF 60-65%).     2 - Normal left ventricular diastolic function.     3 - Normal right ventricular systolic function .      LHC 2/16/17  - Left Main Coronary Artery:             The LM is normal. There is RIDDHI 3 flow.     - Left Anterior Descending Artery:             The LAD has subtotal. There is RIDDHI 3 flow.              The proximal LAD. There is RIDDHI 3 flow. Severe bifurcation lesion with moderate size diagonal.     - D1:             The D1 has a 99% stenosis. There is RIDDHI 3 flow.     - Left Circumflex Artery:             The distal LCX has a 99% stenosis. There is RIDDHI 3 flow. First marginal 80% lesion proximally. Raine     - Right Coronary Artery:             The RCA has luminal irregularities. There is RIDDHI 3 flow.    Has continued to have jaw pain with heavy exertion which was her previous anginal equivalent       Review of Systems   Constitution: Negative for decreased appetite.   HENT: Negative for ear discharge.    Eyes: Negative for blurred vision.   Respiratory: Negative for hemoptysis.    Endocrine: Negative for polyphagia.   Hematologic/Lymphatic: Negative for adenopathy.   Skin: Negative for color change.   Musculoskeletal: Negative for joint swelling.   Neurological: Negative for brief paralysis.   Psychiatric/Behavioral: Negative for hallucinations.        Objective:    Physical Exam   Constitutional: She is oriented to person, place, and time. She appears well-developed and well-nourished.   HENT:   Head: Normocephalic and atraumatic.   Eyes: Conjunctivae are normal. Pupils are equal, round, and reactive to light.   Neck: Normal range of motion. Neck supple.   Cardiovascular: Normal rate, normal heart sounds and intact distal pulses.    Pulmonary/Chest: Effort normal and breath sounds normal.   Abdominal: Soft. Bowel sounds are normal.   Musculoskeletal: Normal range of motion.   Neurological: She is alert and oriented to person, place, and time.   Skin: Skin is warm and dry.         Assessment:       1. Chest pain, unspecified type    2. HTN (hypertension), benign    3. Coronary artery disease involving native coronary artery of native heart without angina pectoris    4. S/P CABG x 3         Plan:       Add imdur 30 qd and prn NTG  PET stress  May need repeat LHC if symptoms persists  OK to return to cardiac  rehab

## 2017-05-26 ENCOUNTER — PATIENT MESSAGE (OUTPATIENT)
Dept: CARDIOLOGY | Facility: CLINIC | Age: 52
End: 2017-05-26

## 2017-05-26 DIAGNOSIS — E66.2 PICKWICKIAN SYNDROME: Primary | ICD-10-CM

## 2017-05-26 DIAGNOSIS — Z95.1 S/P CABG X 3: ICD-10-CM

## 2017-05-26 DIAGNOSIS — E66.9 OBESITY (BMI 30-39.9): ICD-10-CM

## 2017-05-30 ENCOUNTER — PATIENT MESSAGE (OUTPATIENT)
Dept: CARDIOTHORACIC SURGERY | Facility: CLINIC | Age: 52
End: 2017-05-30

## 2017-05-31 ENCOUNTER — PATIENT MESSAGE (OUTPATIENT)
Dept: CARDIOTHORACIC SURGERY | Facility: CLINIC | Age: 52
End: 2017-05-31

## 2017-06-07 ENCOUNTER — TELEPHONE (OUTPATIENT)
Dept: SLEEP MEDICINE | Facility: OTHER | Age: 52
End: 2017-06-07

## 2017-06-12 ENCOUNTER — TELEPHONE (OUTPATIENT)
Dept: SLEEP MEDICINE | Facility: OTHER | Age: 52
End: 2017-06-12

## 2017-06-12 ENCOUNTER — PATIENT MESSAGE (OUTPATIENT)
Dept: CARDIOLOGY | Facility: CLINIC | Age: 52
End: 2017-06-12

## 2017-06-14 ENCOUNTER — TELEPHONE (OUTPATIENT)
Dept: SLEEP MEDICINE | Facility: OTHER | Age: 52
End: 2017-06-14

## 2017-06-15 ENCOUNTER — TELEPHONE (OUTPATIENT)
Dept: CARDIOLOGY | Facility: CLINIC | Age: 52
End: 2017-06-15

## 2017-06-15 NOTE — TELEPHONE ENCOUNTER
----- Message from Bia Garcia sent at 6/15/2017  8:53 AM CDT -----  Contact: SELF  Requesting to speak to Marianne . She had a panic attack & was unable to do pet scan . Can something be called in for anxiety so she can complete the test ? The facility stated they can not proceed without her having a sedative .    [phone]     LL

## 2017-06-21 ENCOUNTER — OFFICE VISIT (OUTPATIENT)
Dept: CARDIOLOGY | Facility: CLINIC | Age: 52
End: 2017-06-21
Payer: COMMERCIAL

## 2017-06-21 VITALS
OXYGEN SATURATION: 97 % | HEART RATE: 73 BPM | HEIGHT: 64 IN | SYSTOLIC BLOOD PRESSURE: 121 MMHG | DIASTOLIC BLOOD PRESSURE: 80 MMHG | BODY MASS INDEX: 39.44 KG/M2 | WEIGHT: 231 LBS

## 2017-06-21 DIAGNOSIS — I10 HTN (HYPERTENSION), BENIGN: ICD-10-CM

## 2017-06-21 DIAGNOSIS — Z95.1 S/P CABG X 3: ICD-10-CM

## 2017-06-21 DIAGNOSIS — I25.10 CORONARY ARTERY DISEASE INVOLVING NATIVE CORONARY ARTERY OF NATIVE HEART WITHOUT ANGINA PECTORIS: ICD-10-CM

## 2017-06-21 DIAGNOSIS — R07.9 CHEST PAIN, UNSPECIFIED TYPE: Primary | ICD-10-CM

## 2017-06-21 PROCEDURE — 99213 OFFICE O/P EST LOW 20 MIN: CPT | Mod: S$GLB,,, | Performed by: INTERNAL MEDICINE

## 2017-06-21 PROCEDURE — 99999 PR PBB SHADOW E&M-EST. PATIENT-LVL III: CPT | Mod: PBBFAC,,, | Performed by: INTERNAL MEDICINE

## 2017-06-21 RX ORDER — ALPRAZOLAM 0.5 MG/1
0.5 TABLET ORAL NIGHTLY PRN
Qty: 30 TABLET | Refills: 0 | Status: SHIPPED | OUTPATIENT
Start: 2017-06-21 | End: 2017-08-19 | Stop reason: SDUPTHER

## 2017-06-23 ENCOUNTER — PATIENT MESSAGE (OUTPATIENT)
Dept: RESEARCH | Facility: HOSPITAL | Age: 52
End: 2017-06-23

## 2017-06-26 ENCOUNTER — HOSPITAL ENCOUNTER (OUTPATIENT)
Dept: SLEEP MEDICINE | Facility: HOSPITAL | Age: 52
Discharge: HOME | End: 2017-06-26
Attending: INTERNAL MEDICINE
Payer: COMMERCIAL

## 2017-06-26 DIAGNOSIS — E66.9 OBESITY (BMI 30-39.9): ICD-10-CM

## 2017-06-26 DIAGNOSIS — Z95.1 S/P CABG X 3: ICD-10-CM

## 2017-06-26 DIAGNOSIS — E66.2 PICKWICKIAN SYNDROME: ICD-10-CM

## 2017-06-26 DIAGNOSIS — G47.33 OSA (OBSTRUCTIVE SLEEP APNEA): ICD-10-CM

## 2017-06-26 PROCEDURE — 95811 POLYSOM 6/>YRS CPAP 4/> PARM: CPT | Mod: 26,,, | Performed by: INTERNAL MEDICINE

## 2017-06-26 NOTE — Clinical Note
"See imported Sleep Study result in "Chart Review" under the "Media tab."  (This Sleep Study was interpreted by a Board Certified Sleep Specialist who conducted an epoch-by-epoch review of the entire raw data recording.)  (The indication for this sleep study was reviewed and deemed appropriate by [employer] practice Parameters or other reasons by a Board Certified Sleep Specialist.) "

## 2017-06-27 PROCEDURE — 95811 POLYSOM 6/>YRS CPAP 4/> PARM: CPT

## 2017-06-27 NOTE — PROGRESS NOTES
Procedures explained and questions answered prior to the start of the study. Pt qualified for cpap titration. Cpap device changed to a full face mask. Pt stated she wanted a device that allowed her to breathe through her mouth.       EKG appeared to bradycardia    Optimal pressure 10 cmH20.  Supine rem achieved at optimal pressure    Prado Dreamware Small with chin strap. Humidification3/Cflex3. Mask changed to a F&P small Simplus Full face mask.       Cpap device adjusted for patient comfort.  Computer was shut down and connections checked due to lost Cflow signal.     Patient stated that she liked cpap better with the full face mask

## 2017-06-28 ENCOUNTER — PATIENT MESSAGE (OUTPATIENT)
Dept: CARDIOLOGY | Facility: CLINIC | Age: 52
End: 2017-06-28

## 2017-07-05 ENCOUNTER — PATIENT MESSAGE (OUTPATIENT)
Dept: CARDIOLOGY | Facility: CLINIC | Age: 52
End: 2017-07-05

## 2017-07-06 DIAGNOSIS — G47.33 OSA (OBSTRUCTIVE SLEEP APNEA): ICD-10-CM

## 2017-07-20 ENCOUNTER — CLINICAL SUPPORT (OUTPATIENT)
Dept: CARDIOLOGY | Facility: CLINIC | Age: 52
End: 2017-07-20
Payer: COMMERCIAL

## 2017-07-20 DIAGNOSIS — I25.10 CORONARY ARTERY DISEASE INVOLVING NATIVE CORONARY ARTERY OF NATIVE HEART WITHOUT ANGINA PECTORIS: ICD-10-CM

## 2017-07-20 DIAGNOSIS — Z95.1 S/P CABG X 3: ICD-10-CM

## 2017-07-20 DIAGNOSIS — I10 HTN (HYPERTENSION), BENIGN: ICD-10-CM

## 2017-07-20 DIAGNOSIS — R07.9 CHEST PAIN, UNSPECIFIED TYPE: ICD-10-CM

## 2017-07-20 LAB — DIASTOLIC DYSFUNCTION: NO

## 2017-07-20 PROCEDURE — 78492 MYOCRD IMG PET MLT RST&STRS: CPT | Mod: S$GLB,,, | Performed by: INTERNAL MEDICINE

## 2017-07-20 PROCEDURE — 93015 CV STRESS TEST SUPVJ I&R: CPT | Mod: S$GLB,,, | Performed by: INTERNAL MEDICINE

## 2017-07-20 PROCEDURE — A9555 RB82 RUBIDIUM: HCPCS | Mod: S$GLB,,, | Performed by: INTERNAL MEDICINE

## 2017-07-21 ENCOUNTER — PATIENT MESSAGE (OUTPATIENT)
Dept: CARDIOLOGY | Facility: CLINIC | Age: 52
End: 2017-07-21

## 2017-07-25 ENCOUNTER — TELEPHONE (OUTPATIENT)
Dept: CARDIOLOGY | Facility: CLINIC | Age: 52
End: 2017-07-25

## 2017-07-26 ENCOUNTER — OFFICE VISIT (OUTPATIENT)
Dept: PULMONOLOGY | Facility: CLINIC | Age: 52
End: 2017-07-26
Payer: COMMERCIAL

## 2017-07-26 VITALS
OXYGEN SATURATION: 98 % | HEIGHT: 64 IN | DIASTOLIC BLOOD PRESSURE: 80 MMHG | BODY MASS INDEX: 40.42 KG/M2 | SYSTOLIC BLOOD PRESSURE: 110 MMHG | HEART RATE: 71 BPM | WEIGHT: 236.75 LBS

## 2017-07-26 DIAGNOSIS — E66.01 MORBID OBESITY DUE TO EXCESS CALORIES: ICD-10-CM

## 2017-07-26 DIAGNOSIS — G47.33 OSA (OBSTRUCTIVE SLEEP APNEA): Primary | ICD-10-CM

## 2017-07-26 PROCEDURE — 99999 PR PBB SHADOW E&M-EST. PATIENT-LVL III: CPT | Mod: PBBFAC,,, | Performed by: INTERNAL MEDICINE

## 2017-07-26 PROCEDURE — 99243 OFF/OP CNSLTJ NEW/EST LOW 30: CPT | Mod: S$GLB,,, | Performed by: INTERNAL MEDICINE

## 2017-07-26 NOTE — PROGRESS NOTES
Jolynn Mancia  was seen as a new patient at the request of  Cortez Burris MD for the evaluation of  ANABELL.    CHIEF COMPLAINT:    Chief Complaint   Patient presents with    Sleep Apnea       HISTORY OF PRESENT ILLNESS: Jolynn Mancia is a 52 y.o. female is here for sleep evaluation.   Patient with a past medical history of HTN, Obesity, CAD s/p bypass 2/2017., hypothyroidism , anxiety.  Patient recently had a hospitilization and was diagnosed with CAD. She was feeling well until may and started feeling more short of breath. She was also not sleeping well and her  complained of more snoring and witnessed apnea. She then called Dr. Burris and was sent to get a sleep study which was performed on 6/21/2017. Patient states that she felt very rested after the sleep study and felt that the full mask made the difference.       + snoring, + witnessed apnea, No daytime somnolence.  No parasomnia.  No cataplexy.  No rls symptoms.      [place] Sleepiness Scale score during initial sleep evaluation was 16.    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  Bed partner:     Time to bed:  7 pm   Lights off:  Off  Sleep onset latency:  5 minutes     Disruptions or awakenings:   3   Wakeup time:      6 am   Perceived sleep quality:    Tire     Daytime naps:     Once a day   Weekend sleep routine:     Same   Caffeine use: 1 cup a day   exercise habit:   Cardiac rehab       PAST MEDICAL HISTORY:    Active Ambulatory Problems     Diagnosis Date Noted    Anxiety 02/15/2017    HTN (hypertension), benign 02/15/2017    Obesity (BMI 30-39.9) 02/15/2017    Tobacco abuse 02/15/2017    Prediabetes 02/21/2017    S/P CABG x 3 02/21/2017    Coronary artery disease involving native coronary artery of native heart without angina pectoris 02/22/2017    Acute renal insufficiency 02/22/2017    Hyperglycemia     Hypothyroidism due to acquired atrophy of thyroid     Pickwickian syndrome 02/24/2017     Pulmonary edema cardiac cause 2/2 postoperative period, actively being treated with diuresis 02/24/2017    Asthma 02/24/2017    Chest pain 05/19/2017    ANABELL (obstructive sleep apnea) 07/06/2017     Resolved Ambulatory Problems     Diagnosis Date Noted    NSTEMI (non-ST elevated myocardial infarction) 02/15/2017    Chest pain 02/15/2017    Hypothyroid 02/15/2017    Pre-operative cardiovascular examination      Past Medical History:   Diagnosis Date    Coronary artery disease     Hypertension     Hypothyroidism     Panic attack     Renal disorder                 PAST SURGICAL HISTORY:    Past Surgical History:   Procedure Laterality Date    CARDIAC SURGERY      CHOLECYSTECTOMY      cystoscope      VAGINA SURGERY         FAMILY HISTORY:                No family history on file.    SOCIAL HISTORY:          Tobacco:   History   Smoking Status    Former Smoker    Packs/day: 0.50    Types: Cigarettes    Quit date: 2/14/2017   Smokeless Tobacco    Not on file       alcohol use:    History   Alcohol Use No                 Occupation: Works for a dermatology clinic as      ALLERGIES:    Review of patient's allergies indicates:   Allergen Reactions    Prednisone        CURRENT MEDICATIONS:    Current Outpatient Prescriptions   Medication Sig Dispense Refill    aspirin (ECOTRIN) 325 MG EC tablet Take 1 tablet (325 mg total) by mouth once daily.  0    atorvastatin (LIPITOR) 80 MG tablet Take 1 tablet by mouth once daily at 6am.  2    ergocalciferol (ERGOCALCIFEROL) 50,000 unit Cap Take 1 capsule (50,000 Units total) by mouth every 7 days. 12 capsule 3    isosorbide mononitrate (IMDUR) 30 MG 24 hr tablet Take 1 tablet (30 mg total) by mouth once daily. 30 tablet 11    lisinopril (PRINIVIL,ZESTRIL) 20 MG tablet Take 20 mg by mouth once daily.      metoprolol tartrate (LOPRESSOR) 25 MG tablet Take 1 tablet (25 mg total) by mouth 2 (two) times daily. 60 tablet 11    nitroGLYCERIN (NITROSTAT)  "0.4 MG SL tablet Place 1 tablet (0.4 mg total) under the tongue every 5 (five) minutes as needed. 25 tablet 11    ondansetron (ZOFRAN-ODT) 4 MG TbDL Take 1 tablet by mouth as needed.  0    SYNTHROID 100 mcg tablet Take one tablet daily except on sundays take 1 1/2 pills. 115 tablet 3    alprazolam (XANAX) 0.5 MG tablet Take 1 tablet (0.5 mg total) by mouth nightly as needed for Insomnia or Anxiety. 30 tablet 0     No current facility-administered medications for this visit.                   REVIEW OF SYSTEMS:     Sleep related symptoms as per HPI.  CONST:Denies weight gain , Lost 4 lbs since February 2017    HEENT: Denies sinus congestion  PULM: + dyspnea since bypass.   CARD:  Denies palpitations   GI:  Denies acid reflux  : Denies polyuria  NEURO: Denies headaches  PSYCH: Denies mood disturbance, anxiety   HEME: Denies anemia   Otherwise, a balance of systems reviewed is negative.          PHYSICAL EXAM:  Vitals:    07/26/17 1051   BP: 110/80   Pulse: 71   SpO2: 98%   Weight: 107.4 kg (236 lb 12.4 oz)   Height: 5' 4" (1.626 m)   PainSc: 0-No pain     Body mass index is 40.64 kg/m².     GENERAL: Normal development, well groomed  HEENT:  Conjunctivae are non-erythematous; Pupils equal, round, and reactive to light; Nose is symmetrical; Nasal mucosa is pink and moist; Septum is midline; Inferior turbinates are normal; Nasal airflow is normal; Posterior pharynx is pink; Modified Mallampati: II; Posterior palate is normal; Tonsils +1; Uvula is normal and pink;Tongue has scalloping; Dentition is fair; No TMJ tenderness; Jaw opening and protrusion without click and without discomfort.  NECK: Supple. Neck circumference is 18 inches. No thyromegaly. No palpable nodes.     SKIN: On face and neck: No abrasions, no rashes, no lesions.  No subcutaneous nodules are palpable.  RESPIRATORY: Chest is clear to auscultation.  Normal chest expansion and non-labored breathing at rest.  CARDIOVASCULAR: Normal S1, S2.  No murmurs, " gallops or rubs. No carotid bruits bilaterally.  EXTREMITIES: No edema. No clubbing. No cyanosis. Station normal. Gait normal.        NEURO/PSYCH: Oriented to time, place and person. Normal attention span and concentration. Affect is full. Mood is normal.                                              DATA   Lab Results   Component Value Date    TSH 5.746 (H) 02/22/2017     Imaging   2/18/2017 : CT chest no signficant changes   2/18/2017 : echo :      1 - Normal left ventricular systolic function (EF 60-65%).     2 - Normal left ventricular diastolic function.     3 - Normal right ventricular systolic function    ASSESSMENT  No diagnosis found.    PLAN:    ANABELL:  Polysomnogram 6/2017 with AHI 24.4 . Titrated for CPAP 10 cm H2O, and preferred full mask. Will order supplies and follow up in 6-8 weeks for compliance and to check how she does with the machine.      Obesity : Patient aware of the need to lose weight. She is on a strict diet. She is doing exercise with [facility].  Encouraged to continue watching her nutrition and exercise.    Education: During our discussion today, we talked about the etiology of obstructive sleep apnea as well as the potential ramifications of untreated sleep apnea, which could include daytime sleepiness, hypertension, heart disease and/or stroke.     Precautions: The patient was advised to abstain from driving should they feel sleepy or drowsy.     Thank you for allowing me the opportunity to participate in the care of your patient.    Patient will No Follow-up on file. with MD/NP    Please cc note to  *Cortez Burris MD.    Patient seen and examined this morning. Discussed with Dr. Diallo - staff attestation to follow.    Nurys Moore MD, MPH  PGY-1

## 2017-07-26 NOTE — LETTER
July 26, 2017      Cortez Burris MD  [address]  [place] [address]           [facility]  [address]  [place] [address]  Phone: [phone]          Patient: Jolynn Mancia   MR Number: [id number]   YOB: 1965   Date of Visit: 7/26/2017       Dear Dr. Cortez Burris:    Thank you for referring Jolynn Mancia to me for evaluation. Attached you will find relevant portions of my assessment and plan of care.    If you have questions, please do not hesitate to call me. I look forward to following Jolynn Mancia along with you.    Sincerely,    Beck Diallo MD    Enclosure  CC:  No Recipients    If you would like to receive this communication electronically, please contact [email or web address] or [phone] to request more information on [employer] Link access.    For providers and/or their staff who would like to refer a patient to [facility], please contact us through our one-stop-shop provider referral line, [facility], at [phone].    If you feel you have received this communication in error or would no longer like to receive these types of communications, please e-mail [email or web address]

## 2017-07-27 ENCOUNTER — TELEPHONE (OUTPATIENT)
Dept: CARDIOLOGY | Facility: CLINIC | Age: 52
End: 2017-07-27

## 2017-07-27 RX ORDER — ATORVASTATIN CALCIUM 80 MG/1
80 TABLET, FILM COATED ORAL
Refills: 2 | Status: CANCELLED | OUTPATIENT
Start: 2017-07-27

## 2017-07-27 RX ORDER — ATORVASTATIN CALCIUM 40 MG/1
40 TABLET, FILM COATED ORAL DAILY
Qty: 90 TABLET | Refills: 3 | Status: SHIPPED | OUTPATIENT
Start: 2017-07-27 | End: 2017-08-21

## 2017-07-31 ENCOUNTER — PATIENT MESSAGE (OUTPATIENT)
Dept: PULMONOLOGY | Facility: CLINIC | Age: 52
End: 2017-07-31

## 2017-08-01 ENCOUNTER — PATIENT MESSAGE (OUTPATIENT)
Dept: CARDIOLOGY | Facility: CLINIC | Age: 52
End: 2017-08-01

## 2017-08-19 ENCOUNTER — PATIENT MESSAGE (OUTPATIENT)
Dept: CARDIOLOGY | Facility: CLINIC | Age: 52
End: 2017-08-19

## 2017-08-21 RX ORDER — ROSUVASTATIN CALCIUM 10 MG/1
10 TABLET, COATED ORAL DAILY
Qty: 90 TABLET | Refills: 3 | Status: SHIPPED | OUTPATIENT
Start: 2017-08-21 | End: 2018-12-06

## 2017-08-21 RX ORDER — ALPRAZOLAM 0.5 MG/1
TABLET ORAL
Qty: 30 TABLET | Refills: 0 | Status: SHIPPED | OUTPATIENT
Start: 2017-08-21 | End: 2017-09-14 | Stop reason: SDUPTHER

## 2017-09-14 RX ORDER — ALPRAZOLAM 0.5 MG/1
TABLET ORAL
Qty: 30 TABLET | Refills: 0 | Status: SHIPPED | OUTPATIENT
Start: 2017-09-14 | End: 2018-12-06

## 2017-11-03 ENCOUNTER — OFFICE VISIT (OUTPATIENT)
Dept: SLEEP MEDICINE | Facility: CLINIC | Age: 52
End: 2017-11-03
Payer: COMMERCIAL

## 2017-11-03 VITALS
WEIGHT: 236 LBS | HEART RATE: 57 BPM | BODY MASS INDEX: 40.29 KG/M2 | HEIGHT: 64 IN | SYSTOLIC BLOOD PRESSURE: 112 MMHG | DIASTOLIC BLOOD PRESSURE: 71 MMHG | OXYGEN SATURATION: 99 %

## 2017-11-03 DIAGNOSIS — R09.81 NASAL CONGESTION: ICD-10-CM

## 2017-11-03 DIAGNOSIS — G47.33 OSA (OBSTRUCTIVE SLEEP APNEA): Primary | ICD-10-CM

## 2017-11-03 DIAGNOSIS — E66.9 OBESITY, UNSPECIFIED CLASSIFICATION, UNSPECIFIED OBESITY TYPE, UNSPECIFIED WHETHER SERIOUS COMORBIDITY PRESENT: ICD-10-CM

## 2017-11-03 PROCEDURE — 99214 OFFICE O/P EST MOD 30 MIN: CPT | Mod: S$GLB,,, | Performed by: INTERNAL MEDICINE

## 2017-11-03 PROCEDURE — 99999 PR PBB SHADOW E&M-EST. PATIENT-LVL III: CPT | Mod: PBBFAC,,, | Performed by: INTERNAL MEDICINE

## 2017-11-03 RX ORDER — NEBIVOLOL HYDROCHLORIDE 2.5 MG/1
TABLET ORAL
Refills: 8 | COMMUNITY
Start: 2017-10-16 | End: 2018-12-06

## 2017-11-03 RX ORDER — ICOSAPENT ETHYL 1000 MG/1
CAPSULE ORAL
Refills: 4 | COMMUNITY
Start: 2017-08-30

## 2017-11-03 NOTE — PROGRESS NOTES
Jolynn Mancia  was seen as a follow up.    CHIEF COMPLAINT:    Chief Complaint   Patient presents with    Sleep Apnea       HISTORY OF PRESENT ILLNESS: Jolynn Mancia is a 52 y.o. female is here for sleep evaluation.   Patient with a past medical history of HTN, Obesity, CAD s/p bypass 2/2017., hypothyroidism , anxiety.  Patient recently had a hospitilization and was diagnosed with CAD. She was feeling well until may and started feeling more short of breath. She was also not sleeping well and her  complained of more snoring and witnessed apnea. She then called Dr. Burris and was sent to get a sleep study which was performed on 6/21/2017. Patient states that she felt very rested after the sleep study and felt that the full mask made the difference.       + snoring, + witnessed apnea, No daytime somnolence.  No parasomnia.  No cataplexy.  No rls symptoms.      [place] Sleepiness Scale score during initial sleep evaluation was 16.  S/p split study 6/21/17 with ahi of 21.  Patient was started on cpap 10 cmH20.  Sleeping thru the night.  Feeling rested upon awake.      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  Bed partner:     Time to bed:  8 pm   Lights off:  Off  Sleep onset latency:  5 minutes     Disruptions or awakenings:   none   Wakeup time:      6 am   Perceived sleep quality:    rested    Daytime naps:    none  Weekend sleep routine:     Same   Caffeine use: 1 cup a day   exercise habit:   Cardiac rehab       PAST MEDICAL HISTORY:    Active Ambulatory Problems     Diagnosis Date Noted    Anxiety 02/15/2017    HTN (hypertension), benign 02/15/2017    Obesity (BMI 30-39.9) 02/15/2017    Tobacco abuse 02/15/2017    Prediabetes 02/21/2017    S/P CABG x 3 02/21/2017    Coronary artery disease involving native coronary artery of native heart without angina pectoris 02/22/2017    Acute renal insufficiency 02/22/2017    Hyperglycemia     Hypothyroidism due to  acquired atrophy of thyroid     Pickwickian syndrome 02/24/2017    Pulmonary edema cardiac cause 2/2 postoperative period, actively being treated with diuresis 02/24/2017    Asthma 02/24/2017    Chest pain 05/19/2017    ANABELL (obstructive sleep apnea) 07/06/2017     Resolved Ambulatory Problems     Diagnosis Date Noted    NSTEMI (non-ST elevated myocardial infarction) 02/15/2017    Chest pain 02/15/2017    Hypothyroid 02/15/2017    Pre-operative cardiovascular examination      Past Medical History:   Diagnosis Date    Coronary artery disease     Hypertension     Hypothyroidism     Panic attack     Renal disorder                 PAST SURGICAL HISTORY:    Past Surgical History:   Procedure Laterality Date    CARDIAC SURGERY      CHOLECYSTECTOMY      cystoscope      VAGINA SURGERY         FAMILY HISTORY:                History reviewed. No pertinent family history.    SOCIAL HISTORY:          Tobacco:   History   Smoking Status    Former Smoker    Packs/day: 0.50    Types: Cigarettes    Quit date: 2/14/2017   Smokeless Tobacco    Not on file       alcohol use:    History   Alcohol Use No                 Occupation: Works for a dermatology clinic as      ALLERGIES:    Review of patient's allergies indicates:   Allergen Reactions    Prednisone        CURRENT MEDICATIONS:    Current Outpatient Prescriptions   Medication Sig Dispense Refill    alprazolam (XANAX) 0.5 MG tablet TAKE 1 TABLET BY MOUTH EVERY NIGHT AT BEDTIME AS NEEDED FOR INSOMNIA OR ANXIETY 30 tablet 0    aspirin (ECOTRIN) 325 MG EC tablet Take 1 tablet (325 mg total) by mouth once daily.  0    BYSTOLIC 2.5 mg Tab TK 1 T PO QD  8    ergocalciferol (ERGOCALCIFEROL) 50,000 unit Cap Take 1 capsule (50,000 Units total) by mouth every 7 days. 12 capsule 3    isosorbide mononitrate (IMDUR) 30 MG 24 hr tablet Take 1 tablet (30 mg total) by mouth once daily. 30 tablet 11    lisinopril (PRINIVIL,ZESTRIL) 20 MG tablet Take 20 mg  "by mouth once daily.      metoprolol tartrate (LOPRESSOR) 25 MG tablet Take 1 tablet (25 mg total) by mouth 2 (two) times daily. 60 tablet 11    nitroGLYCERIN (NITROSTAT) 0.4 MG SL tablet Place 1 tablet (0.4 mg total) under the tongue every 5 (five) minutes as needed. 25 tablet 11    ondansetron (ZOFRAN-ODT) 4 MG TbDL Take 1 tablet by mouth as needed.  0    rosuvastatin (CRESTOR) 10 MG tablet Take 1 tablet (10 mg total) by mouth once daily. 90 tablet 3    SYNTHROID 100 mcg tablet Take one tablet daily except on sundays take 1 1/2 pills. 115 tablet 3    VASCEPA 1 gram Cap   4     No current facility-administered medications for this visit.                   REVIEW OF SYSTEMS:   No acute changes from previous encounter dated 7/26/2017 with exceptions mentioned in HPI.            PHYSICAL EXAM:  Vitals:    11/03/17 0931   BP: 112/71   Pulse: (!) 57   SpO2: 99%   Weight: 107 kg (236 lb)   Height: 5' 4" (1.626 m)   PainSc: 0-No pain     Body mass index is 40.51 kg/m².     GENERAL: Normal development, well groomed  HEENT:  Conjunctivae are non-erythematous; Pupils equal, round, and reactive to light; Nose is symmetrical; Nasal mucosa is pink and moist; Septum is midline; Inferior turbinates are normal; Nasal airflow is normal; Posterior pharynx is pink; Modified Mallampati: II; Posterior palate is normal; Tonsils +1; Uvula is normal and pink;Tongue has scalloping; Dentition is fair; No TMJ tenderness; Jaw opening and protrusion without click and without discomfort.  NECK: Supple. Neck circumference is 18 inches. No thyromegaly. No palpable nodes.     SKIN: On face and neck: No abrasions, no rashes, no lesions.  No subcutaneous nodules are palpable.  RESPIRATORY: Chest is clear to auscultation.  Normal chest expansion and non-labored breathing at rest.  CARDIOVASCULAR: Normal S1, S2.  No murmurs, gallops or rubs. No carotid bruits bilaterally.  EXTREMITIES: No edema. No clubbing. No cyanosis. Station normal. Gait " normal.        NEURO/PSYCH: Oriented to time, place and person. Normal attention span and concentration. Affect is full. Mood is normal.                                              DATA      ahi of 6/26/17 ahi of 24 with optimal cpap of 10 cmH20  Lab Results   Component Value Date    TSH 5.746 (H) 02/22/2017     Imaging   2/18/2017  echo :      1 - Normal left ventricular systolic function (EF 60-65%).     2 - Normal left ventricular diastolic function.     3 - Normal right ventricular systolic function    ASSESSMENT    ICD-10-CM ICD-9-CM    1. ANABELL (obstructive sleep apnea) G47.33 327.23    2. Nasal congestion R09.81 478.19    3. Obesity, unspecified classification, unspecified obesity type, unspecified whether serious comorbidity present E66.9 278.00        PLAN:    ANABELL:  Doing well with cpap with good compliance.  Patient is benefiting with cpap.  96% with >4 hours.      Nasal congestion - sinus irrigation.      Obesity : Patient aware of the need to lose weight. She is on a strict diet. She is doing exercise with [facility].  Encouraged to continue watching her nutrition and exercise.  Not interested in bariatric surgery.    Education: During our discussion today, we talked about the etiology of obstructive sleep apnea as well as the potential ramifications of untreated sleep apnea, which could include daytime sleepiness, hypertension, heart disease and/or stroke.     Precautions: The patient was advised to abstain from driving should they feel sleepy or drowsy.       Patient will Return in about 1 year (around 11/3/2018). with MD/NP    This is 25 minutes visit, over 50% of time spent in direct consultation with patient.

## 2018-06-06 RX ORDER — ERGOCALCIFEROL 1.25 MG/1
50000 CAPSULE ORAL
Qty: 12 CAPSULE | Refills: 3 | Status: SHIPPED | OUTPATIENT
Start: 2018-06-06 | End: 2019-10-23 | Stop reason: SDUPTHER

## 2018-10-30 ENCOUNTER — PATIENT MESSAGE (OUTPATIENT)
Dept: PULMONOLOGY | Facility: CLINIC | Age: 53
End: 2018-10-30

## 2018-12-05 DIAGNOSIS — J45.909 ASTHMA, UNSPECIFIED ASTHMA SEVERITY, UNSPECIFIED WHETHER COMPLICATED, UNSPECIFIED WHETHER PERSISTENT: Primary | ICD-10-CM

## 2018-12-06 ENCOUNTER — OFFICE VISIT (OUTPATIENT)
Dept: PULMONOLOGY | Facility: CLINIC | Age: 53
End: 2018-12-06
Payer: COMMERCIAL

## 2018-12-06 ENCOUNTER — HOSPITAL ENCOUNTER (OUTPATIENT)
Dept: PULMONOLOGY | Facility: CLINIC | Age: 53
Discharge: HOME | End: 2018-12-06
Payer: COMMERCIAL

## 2018-12-06 VITALS
WEIGHT: 249 LBS | HEART RATE: 62 BPM | HEIGHT: 64 IN | OXYGEN SATURATION: 96 % | BODY MASS INDEX: 42.51 KG/M2 | DIASTOLIC BLOOD PRESSURE: 80 MMHG | SYSTOLIC BLOOD PRESSURE: 126 MMHG

## 2018-12-06 DIAGNOSIS — J45.909 ASTHMA, UNSPECIFIED ASTHMA SEVERITY, UNSPECIFIED WHETHER COMPLICATED, UNSPECIFIED WHETHER PERSISTENT: ICD-10-CM

## 2018-12-06 DIAGNOSIS — G47.33 OSA ON CPAP: Primary | ICD-10-CM

## 2018-12-06 DIAGNOSIS — Z95.1 S/P CABG X 3: ICD-10-CM

## 2018-12-06 LAB
PRE FEV1 FVC: 85
PRE FEV1: 2.63
PRE FVC: 3.09
PREDICTED FEV1 FVC: 82
PREDICTED FEV1: 2.6
PREDICTED FVC: 3.17

## 2018-12-06 PROCEDURE — 94729 DIFFUSING CAPACITY: CPT | Mod: S$GLB,,, | Performed by: INTERNAL MEDICINE

## 2018-12-06 PROCEDURE — 99214 OFFICE O/P EST MOD 30 MIN: CPT | Mod: 25,S$GLB,, | Performed by: INTERNAL MEDICINE

## 2018-12-06 PROCEDURE — 3074F SYST BP LT 130 MM HG: CPT | Mod: CPTII,S$GLB,, | Performed by: INTERNAL MEDICINE

## 2018-12-06 PROCEDURE — 99999 PR PBB SHADOW E&M-EST. PATIENT-LVL III: CPT | Mod: PBBFAC,,, | Performed by: INTERNAL MEDICINE

## 2018-12-06 PROCEDURE — 3079F DIAST BP 80-89 MM HG: CPT | Mod: CPTII,S$GLB,, | Performed by: INTERNAL MEDICINE

## 2018-12-06 PROCEDURE — 3008F BODY MASS INDEX DOCD: CPT | Mod: CPTII,S$GLB,, | Performed by: INTERNAL MEDICINE

## 2018-12-06 PROCEDURE — 94010 BREATHING CAPACITY TEST: CPT | Mod: S$GLB,,, | Performed by: INTERNAL MEDICINE

## 2018-12-06 RX ORDER — POLYETHYLENE GLYCOL 3350 17 G/17G
POWDER, FOR SOLUTION ORAL
COMMUNITY
Start: 2017-02-20

## 2018-12-06 RX ORDER — LEVOTHYROXINE SODIUM 100 UG/1
TABLET ORAL
COMMUNITY
Start: 2018-08-31

## 2018-12-06 RX ORDER — NEBIVOLOL 5 MG/1
TABLET ORAL
COMMUNITY
Start: 2018-06-30

## 2018-12-06 RX ORDER — DOCUSATE SODIUM 100 MG/1
CAPSULE, LIQUID FILLED ORAL
COMMUNITY
Start: 2017-02-20

## 2018-12-07 ENCOUNTER — TELEPHONE (OUTPATIENT)
Dept: PULMONOLOGY | Facility: CLINIC | Age: 53
End: 2018-12-07

## 2018-12-07 DIAGNOSIS — G47.33 OSA (OBSTRUCTIVE SLEEP APNEA): Primary | ICD-10-CM

## 2018-12-07 NOTE — TELEPHONE ENCOUNTER
----- Message from Emilia Batista LPN sent at 12/7/2018  1:17 PM CST -----  Dr. Valdovinos asks that you renew CPAP equipment, please.  Thanks, Emilia

## 2018-12-07 NOTE — PROGRESS NOTES
Subjective:      Patient ID: Jolynn Mancia is a 53 y.o. female.    Chief Complaint: No chief complaint on file.    HPI  52 yo former smoker comes at this time to get a renewal on  Her CPAP equipment. 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 She had a 3 vessel CABG procedure two years ago and is doing well. Her next issue is to address her weight. She weighs 249 pounds      No flowsheet data found.  Review of Systems   Constitutional: Negative.    HENT: Negative.    Eyes: Negative.    Respiratory: Negative.         ANABELL   Cardiovascular: Negative.         S/P three vessel CABG Feb. 2017. Doing well   Genitourinary: Negative.    Endocrine: Chronic thyroid replacement.   Musculoskeletal: Negative.    Skin: Negative.    Gastrointestinal: Negative.    Neurological: Negative.    Psychiatric/Behavioral: Negative.      Objective:     Physical Exam   Constitutional: She is oriented to person, place, and time. She appears well-developed and well-nourished. No distress.   Obese    BMI>40   HENT:   Head: Normocephalic and atraumatic.   Right Ear: External ear normal.   Left Ear: External ear normal.   Nose: Nose normal.   Mouth/Throat: Oropharynx is clear and moist.   Eyes: Conjunctivae and EOM are normal. Pupils are equal, round, and reactive to light.   Neck: Normal range of motion. Neck supple. No JVD present. No thyromegaly present.   Cardiovascular: Normal rate, regular rhythm, normal heart sounds and intact distal pulses. Exam reveals no gallop.   No murmur heard.  Well healed sternotomy incision   Pulmonary/Chest: Breath sounds normal. No stridor. No respiratory distress. She has no wheezes. She has no rales. She exhibits no tenderness.   Normal PFT's     Resting Sa02: 95% and after walking the length of the howard and back 96%   Abdominal: Soft. Bowel sounds are normal. She exhibits no distension and no mass. There is no tenderness. There is no rebound and no guarding.   Musculoskeletal: Normal  range of motion. She exhibits no edema.   Lymphadenopathy:     She has no cervical adenopathy.   Neurological: She is alert and oriented to person, place, and time. She has normal reflexes. She displays normal reflexes. No cranial nerve deficit.   Skin: Skin is warm and dry. No rash noted.   Psychiatric: She has a normal mood and affect. Her behavior is normal. Judgment and thought content normal.   Nursing note and vitals reviewed.      Assessment:     1. ANABELL on CPAP    2. S/P CABG x 3      Outpatient Encounter Medications as of 12/6/2018   Medication Sig Dispense Refill    docusate sodium (COLACE) 100 MG capsule       ergocalciferol (ERGOCALCIFEROL) 50,000 unit Cap Take 1 capsule (50,000 Units total) by mouth every 7 days. 12 capsule 3    evolocumab (REPATHA PUSHTRONEX) 420 mg/3.5 mL Injt       levothyroxine (LEVOXYL) 100 MCG tablet       lisinopril (PRINIVIL,ZESTRIL) 20 MG tablet Take 20 mg by mouth once daily.      nebivolol (BYSTOLIC) 5 MG Tab       ondansetron (ZOFRAN-ODT) 4 MG TbDL Take 1 tablet by mouth as needed.  0    polyethylene glycol (MIRALAX) 17 gram/dose powder       VASCEPA 1 gram Cap   4    aspirin (ECOTRIN) 325 MG EC tablet Take 1 tablet (325 mg total) by mouth once daily.  0    nitroGLYCERIN (NITROSTAT) 0.4 MG SL tablet Place 1 tablet (0.4 mg total) under the tongue every 5 (five) minutes as needed. 25 tablet 11    [DISCONTINUED] alprazolam (XANAX) 0.5 MG tablet TAKE 1 TABLET BY MOUTH EVERY NIGHT AT BEDTIME AS NEEDED FOR INSOMNIA OR ANXIETY 30 tablet 0    [DISCONTINUED] BYSTOLIC 2.5 mg Tab TK 1 T PO QD  8    [DISCONTINUED] isosorbide mononitrate (IMDUR) 30 MG 24 hr tablet Take 1 tablet (30 mg total) by mouth once daily. 30 tablet 11    [DISCONTINUED] metoprolol tartrate (LOPRESSOR) 25 MG tablet Take 1 tablet (25 mg total) by mouth 2 (two) times daily. 60 tablet 11    [DISCONTINUED] rosuvastatin (CRESTOR) 10 MG tablet Take 1 tablet (10 mg total) by mouth once daily. 90 tablet 3     [DISCONTINUED] SYNTHROID 100 mcg tablet Take one tablet daily except on sundays take 1 1/2 pills. 115 tablet 3     No facility-administered encounter medications on file as of 12/6/2018.        Plan:    Will ask  to renew her request for CPAP equipment.  Problem List Items Addressed This Visit     S/P CABG x 3      Other Visit Diagnoses     ANABELL on CPAP    -  Primary

## 2018-12-07 NOTE — TELEPHONE ENCOUNTER
Orders for new cpap supplies written. Please inform patient to expect a call from dme in 2-3 working days.

## 2019-03-09 ENCOUNTER — HOSPITAL ENCOUNTER (EMERGENCY)
Facility: HOSPITAL | Age: 54
Discharge: HOME | End: 2019-03-09
Attending: EMERGENCY MEDICINE
Payer: COMMERCIAL

## 2019-03-09 VITALS
SYSTOLIC BLOOD PRESSURE: 122 MMHG | OXYGEN SATURATION: 99 % | HEART RATE: 70 BPM | BODY MASS INDEX: 43.98 KG/M2 | HEIGHT: 62 IN | TEMPERATURE: 99 F | RESPIRATION RATE: 18 BRPM | WEIGHT: 239 LBS | DIASTOLIC BLOOD PRESSURE: 86 MMHG

## 2019-03-09 DIAGNOSIS — J06.9 UPPER RESPIRATORY TRACT INFECTION, UNSPECIFIED TYPE: Primary | ICD-10-CM

## 2019-03-09 DIAGNOSIS — R05.9 COUGH: ICD-10-CM

## 2019-03-09 LAB — DEPRECATED S PYO AG THROAT QL EIA: NEGATIVE

## 2019-03-09 PROCEDURE — 99285 EMERGENCY DEPT VISIT HI MDM: CPT | Mod: 25

## 2019-03-09 PROCEDURE — 93010 ELECTROCARDIOGRAM REPORT: CPT | Mod: ,,, | Performed by: INTERNAL MEDICINE

## 2019-03-09 PROCEDURE — 93010 EKG 12-LEAD: ICD-10-PCS | Mod: ,,, | Performed by: INTERNAL MEDICINE

## 2019-03-09 PROCEDURE — 87880 STREP A ASSAY W/OPTIC: CPT

## 2019-03-09 PROCEDURE — 93005 ELECTROCARDIOGRAM TRACING: CPT

## 2019-03-09 PROCEDURE — 87081 CULTURE SCREEN ONLY: CPT

## 2019-03-09 RX ORDER — PREDNISONE 20 MG/1
40 TABLET ORAL
Status: DISCONTINUED | OUTPATIENT
Start: 2019-03-09 | End: 2019-03-09 | Stop reason: HOSPADM

## 2019-03-09 RX ORDER — PREDNISONE 20 MG/1
40 TABLET ORAL DAILY
Qty: 8 TABLET | Refills: 0 | Status: SHIPPED | OUTPATIENT
Start: 2019-03-10 | End: 2019-03-14

## 2019-03-10 NOTE — ED PROVIDER NOTES
Encounter Date: 3/9/2019    SCRIBE #1 NOTE: I, Christos Miller, am scribing for, and in the presence of,  Kesha Resendiz MD. I have scribed the following portions of the note - Other sections scribed: HPI, ROS.       History     Chief Complaint   Patient presents with    Cough     pt reports productive cough with thick yellow/green sputum, sore throat, bilateral otalgia ongoing for 4 days; pt reports taking Tessalon pearls, Flonase, Mucinex, & Xyzal with no relief; pt reports that her  was recently sick with bronchitis    Otalgia    Sore Throat     CC: Cough    HPI: This 54 y.o. Female with CAD, HTN and hypothyroidism presents to the ED for an evaluation of productive cough with green and yellow mucus, sore throat, bilateral otalgia and post nasal drip for the past 4 days. Pt reports her  was recently sick with bronchitis and received a steroid shot with relief. She states she talked to a doctor on [facility] which suggested she get mucinex, tessalon pearls and ibuprofen (per chart) which she took with no relief. Her symptoms worsened 2 days ago. Pt also attempted  flonase, xyzal, vicks and honey with slight relief. She denies fever or leg swelling.    [facility]: Dr. Ginger Weber      The history is provided by the patient. No  was used.     Review of patient's allergies indicates:   Allergen Reactions    Statins-hmg-coa reductase inhibitors      Past Medical History:   Diagnosis Date    Coronary artery disease     Diabetes mellitus     Hypertension     Hypothyroidism     Panic attack     Renal disorder     Kidney stones     Past Surgical History:   Procedure Laterality Date    AORTOCORONARY BYPASS-CABG x3 N/A 2/20/2017    Performed by [facility] at [facility] OR 2ND [facility]    CARDIAC SURGERY      CHOLECYSTECTOMY      CORONARY ARTERY BYPASS GRAFT (CABG)      cystoscope      HARVEST-VEIN-ENDOVASCULAR N/A 2/20/2017    Performed by Manuel Vieyra MD at  [facility] OR 2ND [facility]    VAGINA SURGERY       History reviewed. No pertinent family history.  Social History     Tobacco Use    Smoking status: Former Smoker     Packs/day: 0.50     Types: Cigarettes     Last attempt to quit: 2017     Years since quittin.0    Smokeless tobacco: Never Used   Substance Use Topics    Alcohol use: No    Drug use: No     Review of Systems   Constitutional: Negative for chills and fever.   HENT: Positive for ear pain, postnasal drip and sore throat. Negative for rhinorrhea.    Eyes: Negative for redness.   Respiratory: Positive for cough. Negative for shortness of breath.    Cardiovascular: Negative for chest pain and leg swelling.   Gastrointestinal: Negative for abdominal pain, diarrhea, nausea and vomiting.   Genitourinary: Negative for dysuria and hematuria.   Musculoskeletal: Negative for back pain and neck pain.   Skin: Negative for rash.   Neurological: Negative for weakness, numbness and headaches.   Hematological: Does not bruise/bleed easily.   Psychiatric/Behavioral: The patient is not nervous/anxious.        Physical Exam     Initial Vitals [19 1936]   BP Pulse Resp Temp SpO2   126/87 72 17 98.7 °F (37.1 °C) 99 %      MAP       --         Physical Exam    Constitutional: She appears well-developed and well-nourished. She is not diaphoretic. No distress.   HENT:   Right Ear: Tympanic membrane normal.   Left Ear: Tympanic membrane normal.   Nose: Mucosal edema present.   Mouth/Throat: Oropharyngeal exudate (Single punctate area of exudate on the left tonsil) present. No posterior oropharyngeal edema or posterior oropharyngeal erythema.   Cardiovascular: Normal rate and regular rhythm.   Well-healed midline sternotomy scar   Pulmonary/Chest: Breath sounds normal. No respiratory distress. She has no wheezes. She has no rhonchi. She has no rales.   Musculoskeletal: She exhibits no edema.   Neurological: She is alert and oriented to person, place, and time.   Skin:  Skin is warm and dry.   Psychiatric: She has a normal mood and affect.         ED Course   Procedures  Labs Reviewed   THROAT SCREEN, RAPID   CULTURE, STREP A,  THROAT          Imaging Results          X-Ray Chest PA And Lateral (Preliminary result)  Result time 03/09/19 20:12:30    ED Interpretation by Kesha Resendiz MD (03/09/19 20:12:30, [facility], Emergency Medicine)    Normal sinus rhythm, rate 67 beats per minute, no ST elevation, no T-wave inversions.  Normal NH interval.   milliseconds.                            X-Rays:   Independently Interpreted Readings:   Other Readings:  There is some peribronchial thickening, no focal infiltrate.  No cardiomegaly.  No pulmonary edema. No pneumothorax.    Medical Decision Making:   Initial Assessment:   54-year-old female presents with sore throat, cough, congestion, bilateral ear pain. Exam notable for single punctate area of exudate on the left tonsil, mucosal edema, clear bilateral effusions behind the tympanic membrane, otherwise is nonfocal. Patient is currently taking Xyzal, Flonase, Tessalon Perles and Mucinex with minimal relief her symptoms.  She was concerned about taking steroids due to history of cardiovascular disease.  I suspect she has a viral URI with middle ear effusions and postnasal drip.  Workup with rapid strep and chest x-ray, I have ordered an EKG because she states in the past she had a heart attack and had bilateral ear pain.  However she states this is different as she has URI symptoms and she is not having any Chest pain or shortness of breath. Her EKG is normal. I have reviewed her chest x-ray, I do not appreciate any focal findings, no focal infiltrate.  We discussed use of prednisone for short course- I believe the benefits outweigh the risk with a short course as she is not having relief with other medications.   ED Management:  I personally reviewed the patient's chest x-ray, I do not see any focal  infiltrate.  Unfortunately, there is a problem with the radiology reads.  Therefore, I will discharge patient at time as I do not appreciate any focal findings on the chest x-ray.  I told her I would call her in the morning if the chest x-ray read comes back abnormal.  She states that she understands and agrees with this plan.  I advised her to continue use of her current medications, and will add 4 days of p.o. prednisone.  She states she understands and agrees.            Scribe Attestation:   Scribe #1: I performed the above scribed service and the documentation accurately describes the services I performed. I attest to the accuracy of the note.    Attending Attestation:           Physician Attestation for Scribe:  Physician Attestation Statement for Scribe #1: I, Kesha Resendiz MD, reviewed documentation, as scribed by Christos Miller in my presence, and it is both accurate and complete.                    Clinical Impression:       ICD-10-CM ICD-9-CM   1. Upper respiratory tract infection, unspecified type J06.9 465.9   2. Cough R05 786.2                                Kesha Resendiz MD  03/09/19 2156

## 2019-03-11 LAB — BACTERIA THROAT CULT: NORMAL

## 2019-10-23 RX ORDER — ERGOCALCIFEROL 1.25 MG/1
CAPSULE ORAL
Qty: 12 CAPSULE | Refills: 0 | Status: SHIPPED | OUTPATIENT
Start: 2019-10-23

## 2023-10-24 NOTE — PROGRESS NOTES
Subjective:    Patient ID:  Jolynn Mancia is a 52 y.o. female who presents for follow-up of Chest Pain      HPI     Previously followed at [facility]  HTN, hypothyroidism, s/p CABG 2/20/17  - LIMA to LAD, SVG to D1, SVG to OM2    PET stress - ordered - could not complete due to anxiety    Echo 2/18/17    1 - Normal left ventricular systolic function (EF 60-65%).     2 - Normal left ventricular diastolic function.     3 - Normal right ventricular systolic function .       [facility] 2/16/17  - Left Main Coronary Artery:             The LM is normal. There is RIDDHI 3 flow.     - Left Anterior Descending Artery:             The LAD has subtotal. There is RIDDHI 3 flow.             The proximal LAD. There is RIDDHI 3 flow. Severe bifurcation lesion with moderate size diagonal.     - D1:             The D1 has a 99% stenosis. There is RIDDHI 3 flow.     - Left Circumflex Artery:             The distal LCX has a 99% stenosis. There is RIDDHI 3 flow. First marginal 80% lesion proximally. Raine     - Right Coronary Artery:             The RCA has luminal irregularities. There is RIDDHI 3 flow.      Saw me recently for recurrent angina            Review of Systems   Constitution: Negative for decreased appetite.   HENT: Negative for ear discharge.    Eyes: Negative for blurred vision.   Respiratory: Negative for hemoptysis.    Endocrine: Negative for polyphagia.   Hematologic/Lymphatic: Negative for adenopathy.   Skin: Negative for color change.   Musculoskeletal: Negative for joint swelling.   Neurological: Negative for brief paralysis.   Psychiatric/Behavioral: Negative for hallucinations.        Objective:    Physical Exam   Constitutional: She is oriented to person, place, and time. She appears well-developed and well-nourished.   HENT:   Head: Normocephalic and atraumatic.   Eyes: Conjunctivae are normal. Pupils are equal, round, and reactive to light.   Neck: Normal range of motion. Neck supple.   Cardiovascular:  Normal rate, normal heart sounds and intact distal pulses.    Pulmonary/Chest: Effort normal and breath sounds normal.   Abdominal: Soft. Bowel sounds are normal.   Musculoskeletal: Normal range of motion.   Neurological: She is alert and oriented to person, place, and time.   Skin: Skin is warm and dry.         Assessment:       1. Chest pain, unspecified type    2. HTN (hypertension), benign    3. Coronary artery disease involving native coronary artery of native heart without angina pectoris    4. S/P CABG x 3         Plan:       PRN xanax  Reschedule PET stress             Private car

## 2024-11-07 ENCOUNTER — TELEPHONE (OUTPATIENT)
Dept: PHARMACY | Facility: CLINIC | Age: 59
End: 2024-11-07
Payer: COMMERCIAL

## 2024-11-07 NOTE — TELEPHONE ENCOUNTER
[facility] Chart Review & Patient Outreach Details For Medication Adherence Project    Reason for Outreach Encounter: 3rd Party payor non-compliance report ([employer], [employer], C, etc)  2.  Patient Outreach Method: Reviewed Patient Chart  3.   Medication in question: lisinopril   LAST FILLED: 10/9/24 for 90 day supply  Hypertension Medications               lisinopril (PRINIVIL,ZESTRIL) 20 MG tablet Take 20 mg by mouth once daily.    nebivolol (BYSTOLIC) 5 MG Tab     nitroGLYCERIN (NITROSTAT) 0.4 MG SL tablet Place 1 tablet (0.4 mg total) under the tongue every 5 (five) minutes as needed.              4.  Reviewed and or Updates Made To: Patient Chart  5. Outreach Outcomes and/or actions taken: Patient filled medication and is on track to be adherent

## 2024-11-21 ENCOUNTER — OFFICE VISIT (OUTPATIENT)
Dept: PULMONOLOGY | Facility: CLINIC | Age: 59
End: 2024-11-21
Payer: COMMERCIAL

## 2024-11-21 VITALS
BODY MASS INDEX: 39.17 KG/M2 | HEIGHT: 62 IN | DIASTOLIC BLOOD PRESSURE: 60 MMHG | WEIGHT: 212.88 LBS | SYSTOLIC BLOOD PRESSURE: 92 MMHG | OXYGEN SATURATION: 99 %

## 2024-11-21 DIAGNOSIS — G47.33 OSA (OBSTRUCTIVE SLEEP APNEA): Primary | ICD-10-CM

## 2024-11-21 DIAGNOSIS — E66.9 OBESITY (BMI 30-39.9): ICD-10-CM

## 2024-11-21 PROCEDURE — 99203 OFFICE O/P NEW LOW 30 MIN: CPT | Mod: S$GLB,,, | Performed by: INTERNAL MEDICINE

## 2024-11-21 PROCEDURE — 4010F ACE/ARB THERAPY RXD/TAKEN: CPT | Mod: CPTII,S$GLB,, | Performed by: INTERNAL MEDICINE

## 2024-11-21 PROCEDURE — 3078F DIAST BP <80 MM HG: CPT | Mod: CPTII,S$GLB,, | Performed by: INTERNAL MEDICINE

## 2024-11-21 PROCEDURE — 3008F BODY MASS INDEX DOCD: CPT | Mod: CPTII,S$GLB,, | Performed by: INTERNAL MEDICINE

## 2024-11-21 PROCEDURE — 3074F SYST BP LT 130 MM HG: CPT | Mod: CPTII,S$GLB,, | Performed by: INTERNAL MEDICINE

## 2024-11-21 PROCEDURE — 1159F MED LIST DOCD IN RCRD: CPT | Mod: CPTII,S$GLB,, | Performed by: INTERNAL MEDICINE

## 2024-11-21 PROCEDURE — 99999 PR PBB SHADOW E&M-EST. PATIENT-LVL III: CPT | Mod: PBBFAC,,, | Performed by: INTERNAL MEDICINE

## 2024-11-21 NOTE — PROGRESS NOTES
Jolynn Mancia  was seen as a follow up.  Our last encounter was 2017.    CHIEF COMPLAINT:    Chief Complaint   Patient presents with    Apnea       HISTORY OF PRESENT ILLNESS: Jolynn Mancia is a 59 y.o. female is here for sleep evaluation.   Patient with a past medical history of HTN, Obesity, CAD s/p bypass 2/2017., hypothyroidism , anxiety.      Our first encounter was 7/26/17.  Around that time, patient was hospitilization and was diagnosed with CAD. She was feeling well until may and started feeling more short of breath. She was also not sleeping well and her  complained of more snoring and witnessed apnea. She then called Dr. Burris and was sent to get a sleep study which was performed on 6/21/2017. Patient states that she felt very rested after the sleep study and felt that the full mask made the difference.   + snoring, + witnessed apnea, No daytime somnolence.  No parasomnia.  No cataplexy.  No rls symptoms.      Warren Sleepiness Scale score during initial sleep evaluation was 16.    S/p split study 6/21/17 with ahi of 21.  Patient was started on cpap 10 cmH20.  Sleeping thru the night.  Feeling rested upon awake.    Patient lost 100 lbs since 2020.  S/p gastric bypass.      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  Bed partner:     Time to bed:  8 pm   Lights off:  Off  Sleep onset latency:  5 minutes     Disruptions or awakenings:   none   Wakeup time:      6 am   Perceived sleep quality:    rested    Daytime naps:    none  Weekend sleep routine:     Same   Caffeine use: 1 cup a day   exercise habit:   Cardiac rehab       PAST MEDICAL HISTORY:    Active Ambulatory Problems     Diagnosis Date Noted    Anxiety 02/15/2017    HTN (hypertension), benign 02/15/2017    Obesity (BMI 30-39.9) 02/15/2017    Tobacco abuse 02/15/2017    Prediabetes 02/21/2017    S/P CABG x 3 02/21/2017    Coronary artery disease involving native coronary artery of native heart without  angina pectoris 2017    Acute renal insufficiency 2017    Hyperglycemia     Hypothyroidism due to acquired atrophy of thyroid     Pickwickian syndrome 2017    Pulmonary edema cardiac cause  postoperative period, actively being treated with diuresis 2017    Asthma 2017    Chest pain 2017    ANABELL (obstructive sleep apnea) 2017     Resolved Ambulatory Problems     Diagnosis Date Noted    NSTEMI (non-ST elevated myocardial infarction) 02/15/2017    Chest pain 02/15/2017    Hypothyroid 02/15/2017    Pre-operative cardiovascular examination      Past Medical History:   Diagnosis Date    Coronary artery disease     Diabetes mellitus     Hypertension     Hypothyroidism     Panic attack     Renal disorder                 PAST SURGICAL HISTORY:    Past Surgical History:   Procedure Laterality Date    CARDIAC SURGERY      CHOLECYSTECTOMY      CORONARY ARTERY BYPASS GRAFT (CABG)      cystoscope      VAGINA SURGERY         FAMILY HISTORY:                No family history on file.    SOCIAL HISTORY:          Tobacco:   Social History     Tobacco Use   Smoking Status Former    Current packs/day: 0.00    Types: Cigarettes    Quit date: 2017    Years since quittin.7   Smokeless Tobacco Never       alcohol use:    Social History     Substance and Sexual Activity   Alcohol Use No                 Occupation: Works for a dermatology clinic as      ALLERGIES:    Review of patient's allergies indicates:   Allergen Reactions    Prednisone        CURRENT MEDICATIONS:    Current Outpatient Medications   Medication Sig Dispense Refill    docusate sodium (COLACE) 100 MG capsule       ergocalciferol (ERGOCALCIFEROL) 50,000 unit Cap TAKE 1 CAPSULE BY MOUTH EVERY 7 DAYS 12 capsule 0    evolocumab (REPATHA PUSHTRONEX) 420 mg/3.5 mL Injt       levothyroxine (LEVOXYL) 100 MCG tablet       lisinopril (PRINIVIL,ZESTRIL) 20 MG tablet Take 20 mg by mouth once daily.      nebivolol  "(BYSTOLIC) 5 MG Tab       ondansetron (ZOFRAN-ODT) 4 MG TbDL Take 1 tablet by mouth as needed.  0    polyethylene glycol (MIRALAX) 17 gram/dose powder       VASCEPA 1 gram Cap   4    aspirin (ECOTRIN) 325 MG EC tablet Take 1 tablet (325 mg total) by mouth once daily.  0    nitroGLYCERIN (NITROSTAT) 0.4 MG SL tablet Place 1 tablet (0.4 mg total) under the tongue every 5 (five) minutes as needed. 25 tablet 11     No current facility-administered medications for this visit.                  REVIEW OF SYSTEMS:     Sleep related symptoms as per HPI.  CONST:Denies weight gain    HEENT: Denies sinus congestion  PULM: Denies dyspnea  CARD:  Denies palpitations   GI:  intermittent acid reflux prn pepcid  : Denies polyuria  NEURO: Denies headaches  PSYCH: anxious  HEME: Denies anemia   Otherwise, a balance of systems reviewed is negative.                  PHYSICAL EXAM:  Vitals:    11/21/24 1340   BP: 92/60   SpO2: 99%   Weight: 96.6 kg (212 lb 13.7 oz)   Height: 5' 2" (1.575 m)   PainSc: 0-No pain       Body mass index is 38.93 kg/m².     GENERAL: Normal development, well groomed  HEENT:  Conjunctivae are non-erythematous; Pupils equal, round, and reactive to light; Nose is symmetrical; Nasal mucosa is pink and moist; Septum is midline; Inferior turbinates are normal; Nasal airflow is normal; Posterior pharynx is pink; Modified Mallampati: II; Posterior palate is normal; Tonsils +1; Uvula is normal and pink;Tongue has scalloping; Dentition is fair; No TMJ tenderness; Jaw opening and protrusion without click and without discomfort.  NECK: Supple. Neck circumference is 18 inches. No thyromegaly. No palpable nodes.     SKIN: On face and neck: No abrasions, no rashes, no lesions.  No subcutaneous nodules are palpable.  RESPIRATORY: Chest is clear to auscultation.  Normal chest expansion and non-labored breathing at rest.  CARDIOVASCULAR: Normal S1, S2.  No murmurs, gallops or rubs. No carotid bruits bilaterally.  EXTREMITIES: " No edema. No clubbing. No cyanosis. Station normal. Gait normal.        NEURO/PSYCH: Oriented to time, place and person. Normal attention span and concentration. Affect is full. Mood is normal.                                              DATA     Split study of 6/26/17 ahi of 24 with optimal cpap of 10 cmH20    Lab Results   Component Value Date    TSH 5.746 (H) 02/22/2017     Imaging   2/18/2017  echo :      1 - Normal left ventricular systolic function (EF 60-65%).     2 - Normal left ventricular diastolic function.     3 - Normal right ventricular systolic function    ASSESSMENT  Problem List Items Addressed This Visit       Obesity (BMI 30-39.9)    Overview     S/p gastric bypass  Lost 100 lbs since 2020           ANABELL (obstructive sleep apnea) - Primary    Overview     Ahi of 24  Currently with Dreamstation 2 at cpap of 10  93%>4 hours.  Residual ahi of 2.3  Patient is benefiting from cpap         Relevant Orders    CPAP/BIPAP SUPPLIES       Education: During our discussion today, we talked about the etiology of obstructive sleep apnea as well as the potential ramifications of untreated sleep apnea, which could include daytime sleepiness, hypertension, heart disease and/or stroke.     Precautions: The patient was advised to abstain from driving should they feel sleepy or drowsy.       Patient will No follow-ups on file. with MD/NP    30 minutes of total time spent on the encounter, which includes face to face time and non-face to face time preparing to see the patient (eg, review of tests), Obtaining and/or reviewing separately obtained history, documenting clinical information in the electronic or other health record, independently interpreting results (not separately reported) and communicating results to the patient/family/caregiver, or Care coordination (not separately reported).

## (undated) DEVICE — SUT VICRYL 3-0 27 SH

## (undated) DEVICE — DRESSING TEGADERM IV 3.5 X 4.5

## (undated) DEVICE — BLADE SAW STERNAL 5/BX

## (undated) DEVICE — Device

## (undated) DEVICE — DRAIN CHEST DRY SUCTION

## (undated) DEVICE — INSERTS STEALTH FIBRA SIZE 5

## (undated) DEVICE — ELECTRODE REM PLYHSV RETURN 9

## (undated) DEVICE — WIRE INTRAMYOCARDIAL TEMP

## (undated) DEVICE — DRESSING ADH ISLAND 3.6 X 14

## (undated) DEVICE — BLOWER MISTER

## (undated) DEVICE — TAPE MEDIPORE 4IN X 2YDS

## (undated) DEVICE — CLOSURE SKIN STERI STRIP 1/2X4

## (undated) DEVICE — SUT PROLENE 5-0 BL C-1 4-24

## (undated) DEVICE — SUT SILK BLK BR. 2 2-60

## (undated) DEVICE — SYS VIRTUOSAPH PLUS EVM

## (undated) DEVICE — DRESSING SPONGE 8PLY 4X4 STRL

## (undated) DEVICE — DRAPE SPLIT STERILE

## (undated) DEVICE — DRESSING AQUACEL SACRAL 9 X 9

## (undated) DEVICE — SUT PROLENE 4-0 SH BLU 36IN

## (undated) DEVICE — SPONGE GAUZE 16PLY 4X4

## (undated) DEVICE — SUT 4-0 12-18IN SILK BLACK

## (undated) DEVICE — GAUZE SPONGE 4X4 12PLY

## (undated) DEVICE — CANNULA VESSEL FREE FLOW

## (undated) DEVICE — SYR 3CC LUER LOC

## (undated) DEVICE — SUT PROLENE 4-0 RB-1 BL MO

## (undated) DEVICE — RETRACTOR OCTOBASE INSERT HOLD

## (undated) DEVICE — PUNCH AORTIC 4.0MM 6/CASE

## (undated) DEVICE — NDL 18GA X1 1/2 REG BEVEL

## (undated) DEVICE — BLADE 4IN EDGE INSULATED

## (undated) DEVICE — KIT SAHARA DRAPE DRAW/LIFT

## (undated) DEVICE — SUT SILK 2-0 SH 18IN BLACK

## (undated) DEVICE — PLEDGET SUT SOFT 3/8X3/16X1/16

## (undated) DEVICE — SUT 6 18IN STEEL MONO CCS

## (undated) DEVICE — DRESSING TRANS 4X4 TEGADERM

## (undated) DEVICE — CLIP SPRING 6MM

## (undated) DEVICE — DRAPE SLUSH WARMER WITH DISC

## (undated) DEVICE — SEE MEDLINE ITEM 146417

## (undated) DEVICE — DRESSING TELFA STRL 4X3 LF

## (undated) DEVICE — SUT LIGACLIP SMALL XTRA

## (undated) DEVICE — SUT VICRYL BR 1 GEN 27 CT-1

## (undated) DEVICE — DRESSING TELFA PAD N ADH 2X3

## (undated) DEVICE — SEE MEDLINE ITEM 157117

## (undated) DEVICE — BANDAGE ACE ELASTIC 6"

## (undated) DEVICE — SUT PROLENE 7-0 BV-1 30

## (undated) DEVICE — PROBE CATH TEMP 16 FRFOLEY 400

## (undated) DEVICE — TRAY CATH UM FOLEY SIL W 16FR

## (undated) DEVICE — TUBING HF INSUFFLATION W/ FLTR

## (undated) DEVICE — SUT 2/0 36IN COATED VICRYL

## (undated) DEVICE — PAD K-THERMIA 24IN X 60IN

## (undated) DEVICE — TRAY HEART

## (undated) DEVICE — SEE MEDLINE ITEM 152515

## (undated) DEVICE — SET DECANTER MEDICHOICE